# Patient Record
Sex: MALE | Race: WHITE | NOT HISPANIC OR LATINO | Employment: FULL TIME | URBAN - METROPOLITAN AREA
[De-identification: names, ages, dates, MRNs, and addresses within clinical notes are randomized per-mention and may not be internally consistent; named-entity substitution may affect disease eponyms.]

---

## 2014-12-05 LAB — CRC RECOMMENDATION EXT: NORMAL

## 2017-07-06 ENCOUNTER — HISTORICAL (OUTPATIENT)
Dept: LAB | Facility: HOSPITAL | Age: 62
End: 2017-07-06

## 2017-07-06 LAB
ABS NEUT (OLG): 4.4 X10(3)/MCL (ref 2.1–9.2)
ALBUMIN SERPL-MCNC: 3.9 GM/DL (ref 3.4–5)
ALBUMIN/GLOB SERPL: 1.1 RATIO (ref 1.1–2)
ALP SERPL-CCNC: 60 UNIT/L (ref 46–116)
ALT SERPL-CCNC: 55 UNIT/L (ref 12–78)
AST SERPL-CCNC: 28 UNIT/L (ref 15–37)
BASOPHILS NFR BLD AUTO: 1 % (ref 0–2)
BILIRUB SERPL-MCNC: 0.3 MG/DL (ref 0.2–1)
BILIRUBIN DIRECT+TOT PNL SERPL-MCNC: 0.06 MG/DL (ref 0–0.2)
BILIRUBIN DIRECT+TOT PNL SERPL-MCNC: 0.24 MG/DL (ref 0–0.8)
BUN SERPL-MCNC: 11 MG/DL (ref 7–18)
CALCIUM SERPL-MCNC: 9.2 MG/DL (ref 8.5–10.1)
CHLORIDE SERPL-SCNC: 106 MMOL/L (ref 98–107)
CHOLEST SERPL-MCNC: 242 MG/DL (ref 0–200)
CHOLEST/HDLC SERPL: 5.8 {RATIO} (ref 0–5)
CO2 SERPL-SCNC: 28.2 MMOL/L (ref 21–32)
CREAT SERPL-MCNC: 0.95 MG/DL (ref 0.6–1.3)
DEPRECATED CALCIDIOL+CALCIFEROL SERPL-MC: 23.97 NG/ML (ref 30–80)
EOSINOPHIL # BLD AUTO: 0.1 X10(3)/MCL
EOSINOPHIL NFR BLD AUTO: 2 %
ERYTHROCYTE [DISTWIDTH] IN BLOOD BY AUTOMATED COUNT: 13 % (ref 11.5–17)
GLOBULIN SER-MCNC: 3.4 GM/DL (ref 2.4–3.5)
GLUCOSE SERPL-MCNC: 108 MG/DL (ref 74–106)
HCT VFR BLD AUTO: 45.9 % (ref 42–52)
HDLC SERPL-MCNC: 42 MG/DL (ref 40–60)
HGB BLD-MCNC: 15.5 GM/DL (ref 14–18)
LDLC SERPL CALC-MCNC: 95 MG/DL (ref 0–129)
LYMPHOCYTES # BLD AUTO: 2.3 X10(3)/MCL
LYMPHOCYTES NFR BLD AUTO: 31 % (ref 13–40)
MCH RBC QN AUTO: 31.7 PG (ref 27–31)
MCHC RBC AUTO-ENTMCNC: 33.7 GM/DL (ref 33–36)
MCV RBC AUTO: 94.1 FL (ref 80–94)
MONOCYTES # BLD AUTO: 0.6 X10(3)/MCL
MONOCYTES NFR BLD AUTO: 8 % (ref 2–11)
NEUTROPHILS # BLD AUTO: 4.4 X10(3)/MCL (ref 2.1–9.2)
NEUTROPHILS NFR BLD AUTO: 59 % (ref 47–80)
PLATELET # BLD AUTO: 251 X10(3)/MCL (ref 130–400)
PMV BLD AUTO: 7.3 FL (ref 7.4–10.4)
POTASSIUM SERPL-SCNC: 4.4 MMOL/L (ref 3.5–5.1)
PROT SERPL-MCNC: 7.3 GM/DL (ref 6.4–8.2)
PSA SERPL-MCNC: 1.59 NG/ML (ref 0–4)
RBC # BLD AUTO: 4.88 X10(6)/MCL (ref 4.7–6.1)
SODIUM SERPL-SCNC: 143 MMOL/L (ref 136–145)
TRIGL SERPL-MCNC: 525 MG/DL
TSH SERPL-ACNC: 0.81 MIU/ML (ref 0.36–3.74)
VLDLC SERPL CALC-MCNC: 105 MG/DL
WBC # SPEC AUTO: 7.4 X10(3)/MCL (ref 4.5–11.5)

## 2017-11-10 ENCOUNTER — HISTORICAL (OUTPATIENT)
Dept: LAB | Facility: HOSPITAL | Age: 62
End: 2017-11-10

## 2017-11-10 LAB
CHOLEST SERPL-MCNC: 221 MG/DL (ref 0–200)
CHOLEST/HDLC SERPL: 4.3 {RATIO} (ref 0–5)
DEPRECATED CALCIDIOL+CALCIFEROL SERPL-MC: 36.68 NG/ML (ref 30–80)
HDLC SERPL-MCNC: 51 MG/DL (ref 40–60)
LDLC SERPL CALC-MCNC: 127 MG/DL (ref 0–129)
TRIGL SERPL-MCNC: 213 MG/DL
VLDLC SERPL CALC-MCNC: 43 MG/DL

## 2018-06-19 ENCOUNTER — HISTORICAL (OUTPATIENT)
Dept: RADIOLOGY | Facility: HOSPITAL | Age: 63
End: 2018-06-19

## 2018-06-27 ENCOUNTER — HISTORICAL (OUTPATIENT)
Dept: RADIOLOGY | Facility: HOSPITAL | Age: 63
End: 2018-06-27

## 2018-07-11 ENCOUNTER — HISTORICAL (OUTPATIENT)
Dept: RADIOLOGY | Facility: HOSPITAL | Age: 63
End: 2018-07-11

## 2019-02-01 ENCOUNTER — HISTORICAL (OUTPATIENT)
Dept: LAB | Facility: HOSPITAL | Age: 64
End: 2019-02-01

## 2019-02-01 LAB
ABS NEUT (OLG): 3.1 X10(3)/MCL (ref 2.1–9.2)
ALBUMIN SERPL-MCNC: 4.2 GM/DL (ref 3.4–5)
ALBUMIN/GLOB SERPL: 1.3 RATIO (ref 1.1–2)
ALP SERPL-CCNC: 55 UNIT/L (ref 46–116)
ALT SERPL-CCNC: 45 UNIT/L (ref 12–78)
AST SERPL-CCNC: 22 UNIT/L (ref 15–37)
BASOPHILS # BLD AUTO: 0 X10(3)/MCL (ref 0–0.2)
BASOPHILS NFR BLD AUTO: 0 %
BILIRUB SERPL-MCNC: 0.3 MG/DL (ref 0.2–1)
BILIRUBIN DIRECT+TOT PNL SERPL-MCNC: 0.07 MG/DL (ref 0–0.2)
BILIRUBIN DIRECT+TOT PNL SERPL-MCNC: 0.23 MG/DL (ref 0–0.8)
BUN SERPL-MCNC: 10 MG/DL (ref 7–18)
CALCIUM SERPL-MCNC: 9.3 MG/DL (ref 8.5–10.1)
CHLORIDE SERPL-SCNC: 102 MMOL/L (ref 98–107)
CHOLEST SERPL-MCNC: 210 MG/DL (ref 0–200)
CHOLEST/HDLC SERPL: 4.3 {RATIO} (ref 0–5)
CO2 SERPL-SCNC: 25 MMOL/L (ref 21–32)
CREAT SERPL-MCNC: 1.05 MG/DL (ref 0.6–1.3)
DEPRECATED CALCIDIOL+CALCIFEROL SERPL-MC: 36 NG/ML (ref 30–80)
EOSINOPHIL # BLD AUTO: 0.1 X10(3)/MCL (ref 0–0.9)
EOSINOPHIL NFR BLD AUTO: 2 %
ERYTHROCYTE [DISTWIDTH] IN BLOOD BY AUTOMATED COUNT: 11.8 % (ref 11.5–17)
EST. AVERAGE GLUCOSE BLD GHB EST-MCNC: 123 MG/DL
GLOBULIN SER-MCNC: 3.3 GM/DL (ref 2.4–3.5)
GLUCOSE SERPL-MCNC: 110 MG/DL (ref 74–106)
HBA1C MFR BLD: 5.9 % (ref 4.5–6.2)
HCT VFR BLD AUTO: 50.2 % (ref 42–52)
HDLC SERPL-MCNC: 49 MG/DL (ref 40–60)
HGB BLD-MCNC: 17.2 GM/DL (ref 14–18)
IMM GRANULOCYTES # BLD AUTO: 0.02 % (ref 0–0.02)
IMM GRANULOCYTES NFR BLD AUTO: 0.4 % (ref 0–0.43)
LDLC SERPL CALC-MCNC: 127 MG/DL (ref 0–129)
LYMPHOCYTES # BLD AUTO: 2 X10(3)/MCL (ref 0.6–4.6)
LYMPHOCYTES NFR BLD AUTO: 35 %
MCH RBC QN AUTO: 32.1 PG (ref 27–31)
MCHC RBC AUTO-ENTMCNC: 34.3 GM/DL (ref 33–36)
MCV RBC AUTO: 93.7 FL (ref 80–94)
MONOCYTES # BLD AUTO: 0.4 X10(3)/MCL (ref 0.1–1.3)
MONOCYTES NFR BLD AUTO: 8 %
NEUTROPHILS # BLD AUTO: 3.1 X10(3)/MCL (ref 1.4–7.9)
NEUTROPHILS NFR BLD AUTO: 55 %
PLATELET # BLD AUTO: 222 X10(3)/MCL (ref 130–400)
PMV BLD AUTO: 9.4 FL (ref 9.4–12.4)
POTASSIUM SERPL-SCNC: 4.1 MMOL/L (ref 3.5–5.1)
PROT SERPL-MCNC: 7.5 GM/DL (ref 6.4–8.2)
PSA SERPL-MCNC: 2.63 NG/ML (ref 0–4)
RBC # BLD AUTO: 5.36 X10(6)/MCL (ref 4.7–6.1)
SODIUM SERPL-SCNC: 137 MMOL/L (ref 136–145)
TRIGL SERPL-MCNC: 169 MG/DL
TSH SERPL-ACNC: 0.71 MIU/ML (ref 0.36–3.74)
VLDLC SERPL CALC-MCNC: 34 MG/DL
WBC # SPEC AUTO: 5.7 X10(3)/MCL (ref 4.5–11.5)

## 2019-03-08 ENCOUNTER — HISTORICAL (OUTPATIENT)
Dept: RADIOLOGY | Facility: HOSPITAL | Age: 64
End: 2019-03-08

## 2019-04-01 ENCOUNTER — HISTORICAL (OUTPATIENT)
Dept: RADIOLOGY | Facility: HOSPITAL | Age: 64
End: 2019-04-01

## 2019-04-17 ENCOUNTER — HISTORICAL (OUTPATIENT)
Dept: RADIOLOGY | Facility: HOSPITAL | Age: 64
End: 2019-04-17

## 2019-04-17 LAB
ABS NEUT (OLG): 3.56 X10(3)/MCL (ref 2.1–9.2)
BASOPHILS # BLD AUTO: 0.03 X10(3)/MCL (ref 0–0.2)
BASOPHILS NFR BLD AUTO: 0.5 % (ref 0–0.9)
BUN SERPL-MCNC: 12 MG/DL (ref 7–18)
CALCIUM SERPL-MCNC: 9 MG/DL (ref 8.5–10.1)
CHLORIDE SERPL-SCNC: 107 MMOL/L (ref 98–107)
CO2 SERPL-SCNC: 25 MMOL/L (ref 21–32)
CREAT SERPL-MCNC: 0.97 MG/DL (ref 0.7–1.3)
CREAT/UREA NIT SERPL: 12
EOSINOPHIL # BLD AUTO: 0.11 X10(3)/MCL (ref 0–0.9)
EOSINOPHIL NFR BLD AUTO: 1.9 % (ref 0–6.5)
ERYTHROCYTE [DISTWIDTH] IN BLOOD BY AUTOMATED COUNT: 12.1 % (ref 11.5–17)
GLUCOSE SERPL-MCNC: 110 MG/DL (ref 74–106)
HCT VFR BLD AUTO: 51.2 % (ref 42–52)
HGB BLD-MCNC: 17.1 GM/DL (ref 14–18)
IMM GRANULOCYTES # BLD AUTO: 0.02 10*3/UL (ref 0–0.02)
IMM GRANULOCYTES NFR BLD AUTO: 0.3 % (ref 0–0.43)
LYMPHOCYTES # BLD AUTO: 1.72 X10(3)/MCL (ref 0.6–4.6)
LYMPHOCYTES NFR BLD AUTO: 29.2 % (ref 16.2–38.3)
MCH RBC QN AUTO: 31.8 PG (ref 27–31)
MCHC RBC AUTO-ENTMCNC: 33.4 GM/DL (ref 33–36)
MCV RBC AUTO: 95.3 FL (ref 80–94)
MONOCYTES # BLD AUTO: 0.45 X10(3)/MCL (ref 0.1–1.3)
MONOCYTES NFR BLD AUTO: 7.6 % (ref 4.7–11.3)
NEUTROPHILS # BLD AUTO: 3.56 X10(3)/MCL (ref 2.1–9.2)
NEUTROPHILS NFR BLD AUTO: 60.5 % (ref 49.1–73.4)
NRBC BLD AUTO-RTO: 0 % (ref 0–0.2)
PLATELET # BLD AUTO: 202 X10(3)/MCL (ref 130–400)
PMV BLD AUTO: 8.6 FL (ref 7.4–10.4)
POTASSIUM SERPL-SCNC: 4.2 MMOL/L (ref 3.5–5.1)
RBC # BLD AUTO: 5.37 X10(6)/MCL (ref 4.7–6.1)
SODIUM SERPL-SCNC: 138 MMOL/L (ref 136–145)
WBC # SPEC AUTO: 5.9 X10(3)/MCL (ref 4.5–11.5)

## 2019-04-26 ENCOUNTER — HISTORICAL (OUTPATIENT)
Dept: SURGERY | Facility: HOSPITAL | Age: 64
End: 2019-04-26

## 2019-05-06 ENCOUNTER — HISTORICAL (OUTPATIENT)
Dept: RADIOLOGY | Facility: HOSPITAL | Age: 64
End: 2019-05-06

## 2019-06-27 ENCOUNTER — HISTORICAL (OUTPATIENT)
Dept: ADMINISTRATIVE | Facility: HOSPITAL | Age: 64
End: 2019-06-27

## 2020-04-29 LAB
ABS NEUT (OLG): 3.74 X10(3)/MCL (ref 2.1–9.2)
ALBUMIN SERPL-MCNC: 4.4 GM/DL (ref 3.4–4.8)
ALBUMIN/GLOB SERPL: 1.5 RATIO (ref 1.1–2)
ALP SERPL-CCNC: 56 UNIT/L (ref 40–150)
ALT SERPL-CCNC: 18 UNIT/L (ref 0–55)
AST SERPL-CCNC: 20 UNIT/L (ref 5–34)
BASOPHILS # BLD AUTO: 0 X10(3)/MCL (ref 0–0.2)
BASOPHILS NFR BLD AUTO: 0 %
BILIRUB SERPL-MCNC: 0.6 MG/DL
BILIRUBIN DIRECT+TOT PNL SERPL-MCNC: 0.2 MG/DL (ref 0–0.5)
BILIRUBIN DIRECT+TOT PNL SERPL-MCNC: 0.4 MG/DL (ref 0–0.8)
BUN SERPL-MCNC: 9 MG/DL (ref 8.4–25.7)
CALCIUM SERPL-MCNC: 9.4 MG/DL (ref 8.8–10)
CHLORIDE SERPL-SCNC: 107 MMOL/L (ref 98–107)
CO2 SERPL-SCNC: 25 MMOL/L (ref 23–31)
CREAT SERPL-MCNC: 0.95 MG/DL (ref 0.73–1.18)
EOSINOPHIL # BLD AUTO: 0.1 X10(3)/MCL (ref 0–0.9)
EOSINOPHIL NFR BLD AUTO: 1 %
ERYTHROCYTE [DISTWIDTH] IN BLOOD BY AUTOMATED COUNT: 12.1 % (ref 11.5–17)
GLOBULIN SER-MCNC: 2.9 GM/DL (ref 2.4–3.5)
GLUCOSE SERPL-MCNC: 105 MG/DL (ref 82–115)
HCT VFR BLD AUTO: 50.3 % (ref 42–52)
HGB BLD-MCNC: 16.4 GM/DL (ref 14–18)
LYMPHOCYTES # BLD AUTO: 1.8 X10(3)/MCL (ref 0.6–4.6)
LYMPHOCYTES NFR BLD AUTO: 30 %
MCH RBC QN AUTO: 30.8 PG (ref 27–31)
MCHC RBC AUTO-ENTMCNC: 32.6 GM/DL (ref 33–36)
MCV RBC AUTO: 94.5 FL (ref 80–94)
MONOCYTES # BLD AUTO: 0.4 X10(3)/MCL (ref 0.1–1.3)
MONOCYTES NFR BLD AUTO: 6 %
NEUTROPHILS # BLD AUTO: 3.74 X10(3)/MCL (ref 2.1–9.2)
NEUTROPHILS NFR BLD AUTO: 62 %
PLATELET # BLD AUTO: 180 X10(3)/MCL (ref 130–400)
PMV BLD AUTO: 9 FL (ref 9.4–12.4)
POTASSIUM SERPL-SCNC: 4 MMOL/L (ref 3.5–5.1)
PROT SERPL-MCNC: 7.3 GM/DL (ref 5.8–7.6)
RBC # BLD AUTO: 5.32 X10(6)/MCL (ref 4.7–6.1)
SODIUM SERPL-SCNC: 138 MMOL/L (ref 136–145)
WBC # SPEC AUTO: 6 X10(3)/MCL (ref 4.5–11.5)

## 2020-04-30 ENCOUNTER — HISTORICAL (OUTPATIENT)
Dept: ADMINISTRATIVE | Facility: HOSPITAL | Age: 65
End: 2020-04-30

## 2020-04-30 LAB — SARS-COV-2 RNA RESP QL NAA+PROBE: NOT DETECTED

## 2020-05-18 ENCOUNTER — HISTORICAL (OUTPATIENT)
Dept: ADMINISTRATIVE | Facility: HOSPITAL | Age: 65
End: 2020-05-18

## 2020-05-18 LAB
ABS NEUT (OLG): 3.44 X10(3)/MCL (ref 2.1–9.2)
ALBUMIN SERPL-MCNC: 4.1 GM/DL (ref 3.4–4.8)
ALBUMIN/GLOB SERPL: 1.4 RATIO (ref 1.1–2)
ALP SERPL-CCNC: 64 UNIT/L (ref 40–150)
ALT SERPL-CCNC: 20 UNIT/L (ref 0–55)
AST SERPL-CCNC: 17 UNIT/L (ref 5–34)
BASOPHILS # BLD AUTO: 0 X10(3)/MCL (ref 0–0.2)
BASOPHILS NFR BLD AUTO: 0.5 %
BILIRUB SERPL-MCNC: 0.4 MG/DL
BILIRUBIN DIRECT+TOT PNL SERPL-MCNC: 0.2 MG/DL (ref 0–0.5)
BILIRUBIN DIRECT+TOT PNL SERPL-MCNC: 0.2 MG/DL (ref 0–0.8)
BUN SERPL-MCNC: 12.7 MG/DL (ref 8.4–25.7)
CALCIUM SERPL-MCNC: 9.3 MG/DL (ref 8.8–10)
CHLORIDE SERPL-SCNC: 106 MMOL/L (ref 98–107)
CO2 SERPL-SCNC: 26 MMOL/L (ref 23–31)
CREAT SERPL-MCNC: 1.03 MG/DL (ref 0.73–1.18)
EOSINOPHIL # BLD AUTO: 0.1 X10(3)/MCL (ref 0–0.9)
EOSINOPHIL NFR BLD AUTO: 2.1 %
ERYTHROCYTE [DISTWIDTH] IN BLOOD BY AUTOMATED COUNT: 11.9 % (ref 11.5–17)
GLOBULIN SER-MCNC: 2.9 GM/DL (ref 2.4–3.5)
GLUCOSE SERPL-MCNC: 96 MG/DL (ref 82–115)
HCT VFR BLD AUTO: 48.4 % (ref 42–52)
HGB BLD-MCNC: 15.8 GM/DL (ref 14–18)
LDH SERPL-CCNC: 170 UNIT/L (ref 140–271)
LYMPHOCYTES # BLD AUTO: 2 X10(3)/MCL (ref 0.6–4.6)
LYMPHOCYTES NFR BLD AUTO: 33.3 %
MCH RBC QN AUTO: 30.7 PG (ref 27–31)
MCHC RBC AUTO-ENTMCNC: 32.6 GM/DL (ref 33–36)
MCV RBC AUTO: 94.2 FL (ref 80–94)
MONOCYTES # BLD AUTO: 0.4 X10(3)/MCL (ref 0.1–1.3)
MONOCYTES NFR BLD AUTO: 7.1 %
NEUTROPHILS # BLD AUTO: 3.4 X10(3)/MCL (ref 2.1–9.2)
NEUTROPHILS NFR BLD AUTO: 56.8 %
PLATELET # BLD AUTO: 189 X10(3)/MCL (ref 130–400)
PMV BLD AUTO: 8.3 FL (ref 9.4–12.4)
POTASSIUM SERPL-SCNC: 4.2 MMOL/L (ref 3.5–5.1)
PROT SERPL-MCNC: 7 GM/DL (ref 5.8–7.6)
RBC # BLD AUTO: 5.14 X10(6)/MCL (ref 4.7–6.1)
SODIUM SERPL-SCNC: 139 MMOL/L (ref 136–145)
WBC # SPEC AUTO: 6.1 X10(3)/MCL (ref 4.5–11.5)

## 2020-06-04 ENCOUNTER — HISTORICAL (OUTPATIENT)
Dept: ADMINISTRATIVE | Facility: HOSPITAL | Age: 65
End: 2020-06-04

## 2020-06-08 ENCOUNTER — HISTORICAL (OUTPATIENT)
Dept: ADMINISTRATIVE | Facility: HOSPITAL | Age: 65
End: 2020-06-08

## 2020-06-08 LAB
ABS NEUT (OLG): 3 X10(3)/MCL (ref 2.1–9.2)
ALBUMIN SERPL-MCNC: 4 GM/DL (ref 3.4–4.8)
ALBUMIN/GLOB SERPL: 1.2 RATIO (ref 1.1–2)
ALP SERPL-CCNC: 63 UNIT/L (ref 40–150)
ALT SERPL-CCNC: 19 UNIT/L (ref 0–55)
AST SERPL-CCNC: 16 UNIT/L (ref 5–34)
BASOPHILS # BLD AUTO: 0 X10(3)/MCL (ref 0–0.2)
BASOPHILS NFR BLD AUTO: 0.4 %
BILIRUB SERPL-MCNC: 0.3 MG/DL
BILIRUBIN DIRECT+TOT PNL SERPL-MCNC: 0.1 MG/DL (ref 0–0.5)
BILIRUBIN DIRECT+TOT PNL SERPL-MCNC: 0.2 MG/DL (ref 0–0.8)
BUN SERPL-MCNC: 11.5 MG/DL (ref 8.4–25.7)
CALCIUM SERPL-MCNC: 9.1 MG/DL (ref 8.8–10)
CHLORIDE SERPL-SCNC: 107 MMOL/L (ref 98–107)
CO2 SERPL-SCNC: 25 MMOL/L (ref 23–31)
CREAT SERPL-MCNC: 0.91 MG/DL (ref 0.73–1.18)
EOSINOPHIL # BLD AUTO: 0.1 X10(3)/MCL (ref 0–0.9)
EOSINOPHIL NFR BLD AUTO: 2 %
ERYTHROCYTE [DISTWIDTH] IN BLOOD BY AUTOMATED COUNT: 11.7 % (ref 11.5–17)
GLOBULIN SER-MCNC: 3.2 GM/DL (ref 2.4–3.5)
GLUCOSE SERPL-MCNC: 101 MG/DL (ref 82–115)
HCT VFR BLD AUTO: 46.6 % (ref 42–52)
HGB BLD-MCNC: 15.2 GM/DL (ref 14–18)
LDH SERPL-CCNC: 176 UNIT/L (ref 140–271)
LYMPHOCYTES # BLD AUTO: 2 X10(3)/MCL (ref 0.6–4.6)
LYMPHOCYTES NFR BLD AUTO: 36.1 %
MCH RBC QN AUTO: 30.8 PG (ref 27–31)
MCHC RBC AUTO-ENTMCNC: 32.6 GM/DL (ref 33–36)
MCV RBC AUTO: 94.3 FL (ref 80–94)
MONOCYTES # BLD AUTO: 0.4 X10(3)/MCL (ref 0.1–1.3)
MONOCYTES NFR BLD AUTO: 7.3 %
NEUTROPHILS # BLD AUTO: 3 X10(3)/MCL (ref 2.1–9.2)
NEUTROPHILS NFR BLD AUTO: 53.7 %
PLATELET # BLD AUTO: 197 X10(3)/MCL (ref 130–400)
PMV BLD AUTO: 8.4 FL (ref 9.4–12.4)
POTASSIUM SERPL-SCNC: 4.2 MMOL/L (ref 3.5–5.1)
PROT SERPL-MCNC: 7.2 GM/DL (ref 5.8–7.6)
RBC # BLD AUTO: 4.94 X10(6)/MCL (ref 4.7–6.1)
SODIUM SERPL-SCNC: 141 MMOL/L (ref 136–145)
WBC # SPEC AUTO: 5.6 X10(3)/MCL (ref 4.5–11.5)

## 2020-06-24 ENCOUNTER — HISTORICAL (OUTPATIENT)
Dept: LAB | Facility: HOSPITAL | Age: 65
End: 2020-06-24

## 2020-06-24 LAB
ABS NEUT (OLG): 4 X10(3)/MCL (ref 2.1–9.2)
ALBUMIN SERPL-MCNC: 3.9 GM/DL (ref 3.4–5)
ALBUMIN/GLOB SERPL: 0.8 RATIO (ref 1.1–2)
ALP SERPL-CCNC: 71 UNIT/L (ref 46–116)
ALT SERPL-CCNC: 19 UNIT/L (ref 12–78)
APPEARANCE, UA: CLEAR
AST SERPL-CCNC: 20 UNIT/L (ref 15–37)
BACTERIA SPEC CULT: NORMAL
BASOPHILS # BLD AUTO: 0 X10(3)/MCL (ref 0–0.2)
BASOPHILS NFR BLD AUTO: 1 %
BILIRUB SERPL-MCNC: 0.6 MG/DL (ref 0.2–1)
BILIRUB UR QL STRIP: NORMAL
BILIRUBIN DIRECT+TOT PNL SERPL-MCNC: 0.18 MG/DL (ref 0–0.2)
BILIRUBIN DIRECT+TOT PNL SERPL-MCNC: 0.42 MG/DL (ref 0–0.8)
BUN SERPL-MCNC: 13.1 MG/DL (ref 7–18)
CALCIUM SERPL-MCNC: 10 MG/DL (ref 8.5–10.1)
CHLORIDE SERPL-SCNC: 100 MMOL/L (ref 98–107)
CHOLEST SERPL-MCNC: 173 MG/DL (ref 0–200)
CHOLEST/HDLC SERPL: 3.1 {RATIO} (ref 0–5)
CO2 SERPL-SCNC: 29.4 MMOL/L (ref 21–32)
COLOR UR: YELLOW
CREAT SERPL-MCNC: 1.03 MG/DL (ref 0.6–1.3)
DEPRECATED CALCIDIOL+CALCIFEROL SERPL-MC: 50.1 NG/ML (ref 6.6–49.9)
EOSINOPHIL # BLD AUTO: 0.1 X10(3)/MCL (ref 0–0.9)
EOSINOPHIL NFR BLD AUTO: 1 %
ERYTHROCYTE [DISTWIDTH] IN BLOOD BY AUTOMATED COUNT: 11.8 % (ref 11.5–17)
GLOBULIN SER-MCNC: 4.7 GM/DL (ref 2.4–3.5)
GLUCOSE (UA): NEGATIVE
GLUCOSE SERPL-MCNC: 117 MG/DL (ref 74–106)
HCT VFR BLD AUTO: 48.6 % (ref 42–52)
HDLC SERPL-MCNC: 55 MG/DL (ref 40–60)
HGB BLD-MCNC: 15.9 GM/DL (ref 14–18)
HGB UR QL STRIP: NORMAL
IMM GRANULOCYTES # BLD AUTO: 0.02 % (ref 0–0.02)
IMM GRANULOCYTES NFR BLD AUTO: 0.3 % (ref 0–0.43)
KETONES UR QL STRIP: 15
LDLC SERPL CALC-MCNC: 107 MG/DL (ref 0–129)
LEUKOCYTE ESTERASE UR QL STRIP: NEGATIVE
LYMPHOCYTES # BLD AUTO: 1.5 X10(3)/MCL (ref 0.6–4.6)
LYMPHOCYTES NFR BLD AUTO: 23 %
MCH RBC QN AUTO: 30.3 PG (ref 27–31)
MCHC RBC AUTO-ENTMCNC: 32.7 GM/DL (ref 33–36)
MCV RBC AUTO: 92.6 FL (ref 80–94)
MONOCYTES # BLD AUTO: 0.9 X10(3)/MCL (ref 0.1–1.3)
MONOCYTES NFR BLD AUTO: 14 %
NEUTROPHILS # BLD AUTO: 4 X10(3)/MCL (ref 1.4–7.9)
NEUTROPHILS NFR BLD AUTO: 61 %
NITRITE UR QL STRIP: NEGATIVE
PH UR STRIP: 5.5 [PH] (ref 5–9)
PLATELET # BLD AUTO: 191 X10(3)/MCL (ref 130–400)
PMV BLD AUTO: 8.7 FL (ref 9.4–12.4)
POTASSIUM SERPL-SCNC: 4.5 MMOL/L (ref 3.5–5.1)
PROT SERPL-MCNC: 8.6 GM/DL (ref 6.4–8.2)
PROT UR QL STRIP: 30
PSA SERPL-MCNC: 9.35 NG/ML (ref 0–4)
RBC # BLD AUTO: 5.25 X10(6)/MCL (ref 4.7–6.1)
RBC #/AREA URNS HPF: NORMAL /HPF
SODIUM SERPL-SCNC: 137 MMOL/L (ref 136–145)
SP GR UR STRIP: 1.02 (ref 1–1.03)
SQUAMOUS EPITHELIAL, UA: NORMAL
TRIGL SERPL-MCNC: 54 MG/DL
UROBILINOGEN UR STRIP-ACNC: 0.2
VLDLC SERPL CALC-MCNC: 11 MG/DL
WBC # SPEC AUTO: 6.6 X10(3)/MCL (ref 4.5–11.5)
WBC #/AREA URNS HPF: NORMAL /[HPF]

## 2020-06-25 LAB — PROT SERPL-MCNC: 8 GM/DL

## 2020-07-23 ENCOUNTER — HISTORICAL (OUTPATIENT)
Dept: LAB | Facility: HOSPITAL | Age: 65
End: 2020-07-23

## 2020-07-23 LAB
ALBUMIN SERPL-MCNC: 3.8 GM/DL (ref 3.4–5)
ALBUMIN SERPL-MCNC: 3.8 GM/DL (ref 3.4–5)
ALBUMIN/GLOB SERPL: 1.2 RATIO (ref 1.1–2)
ALBUMIN/GLOB SERPL: 1.2 RATIO (ref 1.1–2)
ALP SERPL-CCNC: 72 UNIT/L (ref 46–116)
ALP SERPL-CCNC: 72 UNIT/L (ref 46–116)
ALT SERPL-CCNC: 20 UNIT/L (ref 12–78)
ALT SERPL-CCNC: 21 UNIT/L (ref 12–78)
AST SERPL-CCNC: 17 UNIT/L (ref 15–37)
AST SERPL-CCNC: 18 UNIT/L (ref 15–37)
BILIRUB SERPL-MCNC: 0.3 MG/DL (ref 0.2–1)
BILIRUB SERPL-MCNC: 0.3 MG/DL (ref 0.2–1)
BILIRUBIN DIRECT+TOT PNL SERPL-MCNC: 0.1 MG/DL (ref 0–0.2)
BILIRUBIN DIRECT+TOT PNL SERPL-MCNC: 0.13 MG/DL (ref 0–0.2)
BILIRUBIN DIRECT+TOT PNL SERPL-MCNC: 0.17 MG/DL (ref 0–0.8)
BILIRUBIN DIRECT+TOT PNL SERPL-MCNC: 0.2 MG/DL (ref 0–0.8)
BUN SERPL-MCNC: 9.5 MG/DL (ref 7–18)
BUN SERPL-MCNC: 9.5 MG/DL (ref 7–18)
CALCIUM SERPL-MCNC: 9.4 MG/DL (ref 8.5–10.1)
CALCIUM SERPL-MCNC: 9.5 MG/DL (ref 8.5–10.1)
CHLORIDE SERPL-SCNC: 107 MMOL/L (ref 98–107)
CHLORIDE SERPL-SCNC: 107 MMOL/L (ref 98–107)
CO2 SERPL-SCNC: 27.5 MMOL/L (ref 21–32)
CO2 SERPL-SCNC: 29.2 MMOL/L (ref 21–32)
CREAT SERPL-MCNC: 0.95 MG/DL (ref 0.6–1.3)
CREAT SERPL-MCNC: 1.02 MG/DL (ref 0.6–1.3)
ERYTHROCYTE [DISTWIDTH] IN BLOOD BY AUTOMATED COUNT: 12.1 % (ref 11.5–17)
GLOBULIN SER-MCNC: 3.3 GM/DL (ref 2.4–3.5)
GLOBULIN SER-MCNC: 3.3 GM/DL (ref 2.4–3.5)
GLUCOSE SERPL-MCNC: 110 MG/DL (ref 74–106)
GLUCOSE SERPL-MCNC: 113 MG/DL (ref 74–106)
HCT VFR BLD AUTO: 45.8 % (ref 42–52)
HGB BLD-MCNC: 14.9 GM/DL (ref 14–18)
IGG SERPL-MCNC: 981 MG/DL (ref 240–1822)
LDH SERPL-CCNC: 150 UNIT/L (ref 81–234)
MCH RBC QN AUTO: 29.8 PG (ref 27–31)
MCHC RBC AUTO-ENTMCNC: 32.5 GM/DL (ref 33–36)
MCV RBC AUTO: 91.6 FL (ref 80–94)
PLATELET # BLD AUTO: 143 X10(3)/MCL (ref 130–400)
PMV BLD AUTO: 9.2 FL (ref 9.4–12.4)
POTASSIUM SERPL-SCNC: 4.2 MMOL/L (ref 3.5–5.1)
POTASSIUM SERPL-SCNC: 4.4 MMOL/L (ref 3.5–5.1)
PROT SERPL-MCNC: 7.1 GM/DL (ref 6.4–8.2)
PROT SERPL-MCNC: 7.1 GM/DL (ref 6.4–8.2)
PSA SERPL-MCNC: 9.83 NG/ML (ref 0–4)
RBC # BLD AUTO: 5 X10(6)/MCL (ref 4.7–6.1)
SODIUM SERPL-SCNC: 142 MMOL/L (ref 136–145)
SODIUM SERPL-SCNC: 143 MMOL/L (ref 136–145)
WBC # SPEC AUTO: 5.5 X10(3)/MCL (ref 4.5–11.5)

## 2020-10-06 ENCOUNTER — HISTORICAL (OUTPATIENT)
Dept: HEMATOLOGY/ONCOLOGY | Facility: CLINIC | Age: 65
End: 2020-10-06

## 2020-10-06 LAB
ABS NEUT (OLG): 4.68 X10(3)/MCL (ref 2.1–9.2)
ALBUMIN SERPL-MCNC: 4.5 GM/DL (ref 3.4–5)
ALBUMIN/GLOB SERPL: 1.7 RATIO (ref 1.1–2)
ALP SERPL-CCNC: 79 UNIT/L (ref 40–150)
ALT SERPL-CCNC: 12 UNIT/L (ref 0–55)
AST SERPL-CCNC: 17 UNIT/L (ref 5–34)
BASOPHILS # BLD AUTO: 0 X10(3)/MCL (ref 0–0.2)
BASOPHILS NFR BLD AUTO: 0.3 %
BILIRUB SERPL-MCNC: 0.8 MG/DL
BILIRUBIN DIRECT+TOT PNL SERPL-MCNC: 0.3 MG/DL (ref 0–0.5)
BILIRUBIN DIRECT+TOT PNL SERPL-MCNC: 0.5 MG/DL (ref 0–0.8)
BUN SERPL-MCNC: 11.2 MG/DL (ref 8.4–25.7)
CALCIUM SERPL-MCNC: 9.2 MG/DL (ref 8.8–10)
CHLORIDE SERPL-SCNC: 104 MMOL/L (ref 98–107)
CO2 SERPL-SCNC: 28 MMOL/L (ref 23–31)
CREAT SERPL-MCNC: 0.92 MG/DL (ref 0.73–1.18)
EOSINOPHIL # BLD AUTO: 0.1 X10(3)/MCL (ref 0–0.9)
EOSINOPHIL NFR BLD AUTO: 1 %
ERYTHROCYTE [DISTWIDTH] IN BLOOD BY AUTOMATED COUNT: 13 % (ref 11.5–17)
GLOBULIN SER-MCNC: 2.6 GM/DL (ref 2.4–3.5)
GLUCOSE SERPL-MCNC: 102 MG/DL (ref 82–115)
HCT VFR BLD AUTO: 47.2 % (ref 42–52)
HGB BLD-MCNC: 15.7 GM/DL (ref 14–18)
LDH SERPL-CCNC: 181 UNIT/L (ref 140–271)
LYMPHOCYTES # BLD AUTO: 1.6 X10(3)/MCL (ref 0.6–4.6)
LYMPHOCYTES NFR BLD AUTO: 24 %
MCH RBC QN AUTO: 30.4 PG (ref 27–31)
MCHC RBC AUTO-ENTMCNC: 33.3 GM/DL (ref 33–36)
MCV RBC AUTO: 91.5 FL (ref 80–94)
MONOCYTES # BLD AUTO: 0.4 X10(3)/MCL (ref 0.1–1.3)
MONOCYTES NFR BLD AUTO: 6.4 %
NEUTROPHILS # BLD AUTO: 4.7 X10(3)/MCL (ref 2.1–9.2)
NEUTROPHILS NFR BLD AUTO: 68 %
PLATELET # BLD AUTO: 147 X10(3)/MCL (ref 130–400)
PMV BLD AUTO: 8.6 FL (ref 9.4–12.4)
POC CREATININE: 0.8 MG/DL (ref 0.6–1.3)
POTASSIUM SERPL-SCNC: 4.2 MMOL/L (ref 3.5–5.1)
PROT SERPL-MCNC: 7.1 GM/DL (ref 5.8–7.6)
RBC # BLD AUTO: 5.16 X10(6)/MCL (ref 4.7–6.1)
SODIUM SERPL-SCNC: 142 MMOL/L (ref 136–145)
WBC # SPEC AUTO: 6.9 X10(3)/MCL (ref 4.5–11.5)

## 2020-12-11 ENCOUNTER — HISTORICAL (OUTPATIENT)
Dept: HEMATOLOGY/ONCOLOGY | Facility: CLINIC | Age: 65
End: 2020-12-11

## 2020-12-11 LAB
ABS NEUT (OLG): 4.27 X10(3)/MCL (ref 2.1–9.2)
ALBUMIN SERPL-MCNC: 3.9 GM/DL (ref 3.4–4.8)
ALBUMIN/GLOB SERPL: 1.3 RATIO (ref 1.1–2)
ALP SERPL-CCNC: 100 UNIT/L (ref 40–150)
ALT SERPL-CCNC: 11 UNIT/L (ref 0–55)
AST SERPL-CCNC: 12 UNIT/L (ref 5–34)
BASOPHILS # BLD AUTO: 0 X10(3)/MCL (ref 0–0.2)
BASOPHILS NFR BLD AUTO: 0.2 %
BILIRUB SERPL-MCNC: 0.5 MG/DL
BILIRUBIN DIRECT+TOT PNL SERPL-MCNC: 0.2 MG/DL (ref 0–0.5)
BILIRUBIN DIRECT+TOT PNL SERPL-MCNC: 0.3 MG/DL (ref 0–0.8)
BUN SERPL-MCNC: 10.5 MG/DL (ref 8.4–25.7)
CALCIUM SERPL-MCNC: 9.3 MG/DL (ref 8.8–10)
CHLORIDE SERPL-SCNC: 104 MMOL/L (ref 98–107)
CO2 SERPL-SCNC: 25 MMOL/L (ref 23–31)
CREAT SERPL-MCNC: 0.8 MG/DL (ref 0.73–1.18)
EOSINOPHIL # BLD AUTO: 0.1 X10(3)/MCL (ref 0–0.9)
EOSINOPHIL NFR BLD AUTO: 1.1 %
ERYTHROCYTE [DISTWIDTH] IN BLOOD BY AUTOMATED COUNT: 12.4 % (ref 11.5–17)
GLOBULIN SER-MCNC: 3 GM/DL (ref 2.4–3.5)
GLUCOSE SERPL-MCNC: 106 MG/DL (ref 82–115)
HCT VFR BLD AUTO: 45.6 % (ref 42–52)
HGB BLD-MCNC: 14.9 GM/DL (ref 14–18)
LDH SERPL-CCNC: 170 UNIT/L (ref 140–271)
LYMPHOCYTES # BLD AUTO: 1.6 X10(3)/MCL (ref 0.6–4.6)
LYMPHOCYTES NFR BLD AUTO: 25.2 %
MCH RBC QN AUTO: 30 PG (ref 27–31)
MCHC RBC AUTO-ENTMCNC: 32.7 GM/DL (ref 33–36)
MCV RBC AUTO: 91.8 FL (ref 80–94)
MONOCYTES # BLD AUTO: 0.5 X10(3)/MCL (ref 0.1–1.3)
MONOCYTES NFR BLD AUTO: 8.2 %
NEUTROPHILS # BLD AUTO: 4.3 X10(3)/MCL (ref 2.1–9.2)
NEUTROPHILS NFR BLD AUTO: 65.1 %
PLATELET # BLD AUTO: 179 X10(3)/MCL (ref 130–400)
PMV BLD AUTO: 8 FL (ref 9.4–12.4)
POTASSIUM SERPL-SCNC: 4.2 MMOL/L (ref 3.5–5.1)
PROT SERPL-MCNC: 6.9 GM/DL (ref 5.8–7.6)
RBC # BLD AUTO: 4.97 X10(6)/MCL (ref 4.7–6.1)
SODIUM SERPL-SCNC: 139 MMOL/L (ref 136–145)
WBC # SPEC AUTO: 6.6 X10(3)/MCL (ref 4.5–11.5)

## 2021-02-12 ENCOUNTER — HISTORICAL (OUTPATIENT)
Dept: HEMATOLOGY/ONCOLOGY | Facility: CLINIC | Age: 66
End: 2021-02-12

## 2021-02-12 LAB
ABS NEUT (OLG): 3.32 X10(3)/MCL (ref 2.1–9.2)
ALBUMIN SERPL-MCNC: 4 GM/DL (ref 3.4–4.8)
ALBUMIN/GLOB SERPL: 1.2 RATIO (ref 1.1–2)
ALP SERPL-CCNC: 114 UNIT/L (ref 40–150)
ALT SERPL-CCNC: 13 UNIT/L (ref 0–55)
AST SERPL-CCNC: 15 UNIT/L (ref 5–34)
BASOPHILS # BLD AUTO: 0 X10(3)/MCL (ref 0–0.2)
BASOPHILS NFR BLD AUTO: 0.4 %
BILIRUB SERPL-MCNC: 0.4 MG/DL
BILIRUBIN DIRECT+TOT PNL SERPL-MCNC: 0.2 MG/DL (ref 0–0.5)
BILIRUBIN DIRECT+TOT PNL SERPL-MCNC: 0.2 MG/DL (ref 0–0.8)
BUN SERPL-MCNC: 13 MG/DL (ref 8.4–25.7)
CALCIUM SERPL-MCNC: 9.5 MG/DL (ref 8.8–10)
CHLORIDE SERPL-SCNC: 103 MMOL/L (ref 98–107)
CO2 SERPL-SCNC: 27 MMOL/L (ref 23–31)
CREAT SERPL-MCNC: 0.87 MG/DL (ref 0.73–1.18)
EOSINOPHIL # BLD AUTO: 0.1 X10(3)/MCL (ref 0–0.9)
EOSINOPHIL NFR BLD AUTO: 2.2 %
ERYTHROCYTE [DISTWIDTH] IN BLOOD BY AUTOMATED COUNT: 13.2 % (ref 11.5–17)
GLOBULIN SER-MCNC: 3.3 GM/DL (ref 2.4–3.5)
GLUCOSE SERPL-MCNC: 88 MG/DL (ref 82–115)
HCT VFR BLD AUTO: 43.2 % (ref 42–52)
HGB BLD-MCNC: 14.2 GM/DL (ref 14–18)
LDH SERPL-CCNC: 195 UNIT/L (ref 140–271)
LYMPHOCYTES # BLD AUTO: 0.6 X10(3)/MCL (ref 0.6–4.6)
LYMPHOCYTES NFR BLD AUTO: 13.3 %
MCH RBC QN AUTO: 30 PG (ref 27–31)
MCHC RBC AUTO-ENTMCNC: 32.9 GM/DL (ref 33–36)
MCV RBC AUTO: 91.3 FL (ref 80–94)
MONOCYTES # BLD AUTO: 0.4 X10(3)/MCL (ref 0.1–1.3)
MONOCYTES NFR BLD AUTO: 10 %
NEUTROPHILS # BLD AUTO: 3.3 X10(3)/MCL (ref 2.1–9.2)
NEUTROPHILS NFR BLD AUTO: 73.9 %
PLATELET # BLD AUTO: 189 X10(3)/MCL (ref 130–400)
PMV BLD AUTO: 7.8 FL (ref 9.4–12.4)
POTASSIUM SERPL-SCNC: 4.6 MMOL/L (ref 3.5–5.1)
PROT SERPL-MCNC: 7.3 GM/DL (ref 5.8–7.6)
RBC # BLD AUTO: 4.73 X10(6)/MCL (ref 4.7–6.1)
SODIUM SERPL-SCNC: 140 MMOL/L (ref 136–145)
WBC # SPEC AUTO: 4.5 X10(3)/MCL (ref 4.5–11.5)

## 2021-03-04 ENCOUNTER — HISTORICAL (OUTPATIENT)
Dept: RADIOLOGY | Facility: HOSPITAL | Age: 66
End: 2021-03-04

## 2021-03-05 ENCOUNTER — HISTORICAL (OUTPATIENT)
Dept: HEMATOLOGY/ONCOLOGY | Facility: CLINIC | Age: 66
End: 2021-03-05

## 2021-03-05 LAB
ABS NEUT (OLG): 3.02 X10(3)/MCL (ref 2.1–9.2)
ALBUMIN SERPL-MCNC: 3.5 GM/DL (ref 3.4–4.8)
ALBUMIN/GLOB SERPL: 1 RATIO (ref 1.1–2)
ALP SERPL-CCNC: 105 UNIT/L (ref 40–150)
ALT SERPL-CCNC: 15 UNIT/L (ref 0–55)
AST SERPL-CCNC: 16 UNIT/L (ref 5–34)
BASOPHILS # BLD AUTO: 0 X10(3)/MCL (ref 0–0.2)
BASOPHILS NFR BLD AUTO: 0.5 %
BILIRUB SERPL-MCNC: 0.5 MG/DL
BILIRUBIN DIRECT+TOT PNL SERPL-MCNC: 0.2 MG/DL (ref 0–0.5)
BILIRUBIN DIRECT+TOT PNL SERPL-MCNC: 0.3 MG/DL (ref 0–0.8)
BUN SERPL-MCNC: 10.8 MG/DL (ref 8.4–25.7)
CALCIUM SERPL-MCNC: 9.3 MG/DL (ref 8.8–10)
CHLORIDE SERPL-SCNC: 102 MMOL/L (ref 98–107)
CO2 SERPL-SCNC: 27 MMOL/L (ref 23–31)
CREAT SERPL-MCNC: 0.79 MG/DL (ref 0.73–1.18)
EOSINOPHIL # BLD AUTO: 0.1 X10(3)/MCL (ref 0–0.9)
EOSINOPHIL NFR BLD AUTO: 1.9 %
ERYTHROCYTE [DISTWIDTH] IN BLOOD BY AUTOMATED COUNT: 13.5 % (ref 11.5–17)
GLOBULIN SER-MCNC: 3.4 GM/DL (ref 2.4–3.5)
GLUCOSE SERPL-MCNC: 134 MG/DL (ref 82–115)
HCT VFR BLD AUTO: 43.4 % (ref 42–52)
HGB BLD-MCNC: 13.9 GM/DL (ref 14–18)
LDH SERPL-CCNC: 175 UNIT/L (ref 140–271)
LYMPHOCYTES # BLD AUTO: 0.6 X10(3)/MCL (ref 0.6–4.6)
LYMPHOCYTES NFR BLD AUTO: 13.7 %
MCH RBC QN AUTO: 30.1 PG (ref 27–31)
MCHC RBC AUTO-ENTMCNC: 32 GM/DL (ref 33–36)
MCV RBC AUTO: 93.9 FL (ref 80–94)
MONOCYTES # BLD AUTO: 0.5 X10(3)/MCL (ref 0.1–1.3)
MONOCYTES NFR BLD AUTO: 11.3 %
NEUTROPHILS # BLD AUTO: 3 X10(3)/MCL (ref 2.1–9.2)
NEUTROPHILS NFR BLD AUTO: 72.4 %
PLATELET # BLD AUTO: 184 X10(3)/MCL (ref 130–400)
PMV BLD AUTO: 8.1 FL (ref 9.4–12.4)
POTASSIUM SERPL-SCNC: 3.8 MMOL/L (ref 3.5–5.1)
PROT SERPL-MCNC: 6.9 GM/DL (ref 5.8–7.6)
RBC # BLD AUTO: 4.62 X10(6)/MCL (ref 4.7–6.1)
SODIUM SERPL-SCNC: 138 MMOL/L (ref 136–145)
WBC # SPEC AUTO: 4.2 X10(3)/MCL (ref 4.5–11.5)

## 2021-04-16 ENCOUNTER — HISTORICAL (OUTPATIENT)
Dept: HEMATOLOGY/ONCOLOGY | Facility: CLINIC | Age: 66
End: 2021-04-16

## 2021-04-16 LAB
ABS NEUT (OLG): 4.42 X10(3)/MCL (ref 2.1–9.2)
ALBUMIN SERPL-MCNC: 3.7 GM/DL (ref 3.4–4.8)
ALBUMIN/GLOB SERPL: 1.1 RATIO (ref 1.1–2)
ALP SERPL-CCNC: 113 UNIT/L (ref 40–150)
ALT SERPL-CCNC: 13 UNIT/L (ref 0–55)
AST SERPL-CCNC: 16 UNIT/L (ref 5–34)
BASOPHILS # BLD AUTO: 0 X10(3)/MCL (ref 0–0.2)
BASOPHILS NFR BLD AUTO: 0.2 %
BILIRUB SERPL-MCNC: 0.4 MG/DL
BILIRUBIN DIRECT+TOT PNL SERPL-MCNC: 0.2 MG/DL (ref 0–0.5)
BILIRUBIN DIRECT+TOT PNL SERPL-MCNC: 0.2 MG/DL (ref 0–0.8)
BUN SERPL-MCNC: 9.2 MG/DL (ref 8.4–25.7)
CALCIUM SERPL-MCNC: 10 MG/DL (ref 8.8–10)
CHLORIDE SERPL-SCNC: 105 MMOL/L (ref 98–107)
CO2 SERPL-SCNC: 26 MMOL/L (ref 23–31)
CREAT SERPL-MCNC: 0.88 MG/DL (ref 0.73–1.18)
EOSINOPHIL # BLD AUTO: 0.1 X10(3)/MCL (ref 0–0.9)
EOSINOPHIL NFR BLD AUTO: 2.2 %
ERYTHROCYTE [DISTWIDTH] IN BLOOD BY AUTOMATED COUNT: 12.4 % (ref 11.5–17)
GLOBULIN SER-MCNC: 3.5 GM/DL (ref 2.4–3.5)
GLUCOSE SERPL-MCNC: 135 MG/DL (ref 82–115)
HCT VFR BLD AUTO: 42.7 % (ref 42–52)
HGB BLD-MCNC: 13.5 GM/DL (ref 14–18)
LDH SERPL-CCNC: 193 UNIT/L (ref 140–271)
LYMPHOCYTES # BLD AUTO: 0.7 X10(3)/MCL (ref 0.6–4.6)
LYMPHOCYTES NFR BLD AUTO: 12.1 %
MCH RBC QN AUTO: 29.5 PG (ref 27–31)
MCHC RBC AUTO-ENTMCNC: 31.6 GM/DL (ref 33–36)
MCV RBC AUTO: 93.2 FL (ref 80–94)
MONOCYTES # BLD AUTO: 0.5 X10(3)/MCL (ref 0.1–1.3)
MONOCYTES NFR BLD AUTO: 8.8 %
NEUTROPHILS # BLD AUTO: 4.4 X10(3)/MCL (ref 2.1–9.2)
NEUTROPHILS NFR BLD AUTO: 76.2 %
PLATELET # BLD AUTO: 226 X10(3)/MCL (ref 130–400)
PMV BLD AUTO: 7.9 FL (ref 9.4–12.4)
POTASSIUM SERPL-SCNC: 4 MMOL/L (ref 3.5–5.1)
PROT SERPL-MCNC: 7.2 GM/DL (ref 5.8–7.6)
RBC # BLD AUTO: 4.58 X10(6)/MCL (ref 4.7–6.1)
SODIUM SERPL-SCNC: 140 MMOL/L (ref 136–145)
WBC # SPEC AUTO: 5.8 X10(3)/MCL (ref 4.5–11.5)

## 2021-05-05 ENCOUNTER — HISTORICAL (OUTPATIENT)
Dept: SURGERY | Facility: HOSPITAL | Age: 66
End: 2021-05-05

## 2021-05-21 ENCOUNTER — HISTORICAL (OUTPATIENT)
Dept: HEMATOLOGY/ONCOLOGY | Facility: CLINIC | Age: 66
End: 2021-05-21

## 2021-05-21 LAB
ABS NEUT (OLG): 4.65 X10(3)/MCL (ref 2.1–9.2)
ALBUMIN SERPL-MCNC: 3.5 GM/DL (ref 3.4–4.8)
ALBUMIN/GLOB SERPL: 1 RATIO (ref 1.1–2)
ALP SERPL-CCNC: 143 UNIT/L (ref 40–150)
ALT SERPL-CCNC: 12 UNIT/L (ref 0–55)
AST SERPL-CCNC: 14 UNIT/L (ref 5–34)
BASOPHILS # BLD AUTO: 0 X10(3)/MCL (ref 0–0.2)
BASOPHILS NFR BLD AUTO: 0.3 %
BILIRUB SERPL-MCNC: 0.3 MG/DL
BILIRUBIN DIRECT+TOT PNL SERPL-MCNC: 0.1 MG/DL (ref 0–0.8)
BILIRUBIN DIRECT+TOT PNL SERPL-MCNC: 0.2 MG/DL (ref 0–0.5)
BUN SERPL-MCNC: 11.7 MG/DL (ref 8.4–25.7)
CALCIUM SERPL-MCNC: 9.5 MG/DL (ref 8.8–10)
CHLORIDE SERPL-SCNC: 105 MMOL/L (ref 98–107)
CO2 SERPL-SCNC: 26 MMOL/L (ref 23–31)
CREAT SERPL-MCNC: 0.74 MG/DL (ref 0.73–1.18)
EOSINOPHIL # BLD AUTO: 0 X10(3)/MCL (ref 0–0.9)
EOSINOPHIL NFR BLD AUTO: 0.8 %
ERYTHROCYTE [DISTWIDTH] IN BLOOD BY AUTOMATED COUNT: 13.1 % (ref 11.5–17)
GLOBULIN SER-MCNC: 3.5 GM/DL (ref 2.4–3.5)
GLUCOSE SERPL-MCNC: 104 MG/DL (ref 82–115)
HCT VFR BLD AUTO: 40.1 % (ref 42–52)
HGB BLD-MCNC: 13.1 GM/DL (ref 14–18)
LDH SERPL-CCNC: 168 UNIT/L (ref 140–271)
LYMPHOCYTES # BLD AUTO: 0.8 X10(3)/MCL (ref 0.6–4.6)
LYMPHOCYTES NFR BLD AUTO: 13.6 %
MCH RBC QN AUTO: 29.1 PG (ref 27–31)
MCHC RBC AUTO-ENTMCNC: 32.7 GM/DL (ref 33–36)
MCV RBC AUTO: 89.1 FL (ref 80–94)
MONOCYTES # BLD AUTO: 0.6 X10(3)/MCL (ref 0.1–1.3)
MONOCYTES NFR BLD AUTO: 9.9 %
NEUTROPHILS # BLD AUTO: 4.6 X10(3)/MCL (ref 2.1–9.2)
NEUTROPHILS NFR BLD AUTO: 75.1 %
PLATELET # BLD AUTO: 212 X10(3)/MCL (ref 130–400)
PMV BLD AUTO: 7.9 FL (ref 9.4–12.4)
POTASSIUM SERPL-SCNC: 4.2 MMOL/L (ref 3.5–5.1)
PROT SERPL-MCNC: 7 GM/DL (ref 5.8–7.6)
RBC # BLD AUTO: 4.5 X10(6)/MCL (ref 4.7–6.1)
SODIUM SERPL-SCNC: 141 MMOL/L (ref 136–145)
WBC # SPEC AUTO: 6.2 X10(3)/MCL (ref 4.5–11.5)

## 2021-06-02 ENCOUNTER — HISTORICAL (OUTPATIENT)
Dept: ADMINISTRATIVE | Facility: HOSPITAL | Age: 66
End: 2021-06-02

## 2021-06-02 LAB
ABS NEUT (OLG): 4.59 X10(3)/MCL (ref 2.1–9.2)
ALBUMIN SERPL-MCNC: 3.5 GM/DL (ref 3.4–4.8)
ALBUMIN/GLOB SERPL: 1.1 RATIO (ref 1.1–2)
ALP SERPL-CCNC: 148 UNIT/L (ref 40–150)
ALT SERPL-CCNC: 47 UNIT/L (ref 0–55)
AST SERPL-CCNC: 16 UNIT/L (ref 5–34)
BASOPHILS # BLD AUTO: 0 X10(3)/MCL (ref 0–0.2)
BASOPHILS NFR BLD AUTO: 0.2 %
BILIRUB SERPL-MCNC: 0.4 MG/DL
BILIRUBIN DIRECT+TOT PNL SERPL-MCNC: 0.2 MG/DL (ref 0–0.5)
BILIRUBIN DIRECT+TOT PNL SERPL-MCNC: 0.2 MG/DL (ref 0–0.8)
BUN SERPL-MCNC: 10.8 MG/DL (ref 8.4–25.7)
CALCIUM SERPL-MCNC: 9.9 MG/DL (ref 8.8–10)
CHLORIDE SERPL-SCNC: 102 MMOL/L (ref 98–107)
CO2 SERPL-SCNC: 27 MMOL/L (ref 23–31)
CREAT SERPL-MCNC: 0.84 MG/DL (ref 0.73–1.18)
EOSINOPHIL # BLD AUTO: 0 X10(3)/MCL (ref 0–0.9)
EOSINOPHIL NFR BLD AUTO: 0.8 %
ERYTHROCYTE [DISTWIDTH] IN BLOOD BY AUTOMATED COUNT: 13.5 % (ref 11.5–17)
GLOBULIN SER-MCNC: 3.2 GM/DL (ref 2.4–3.5)
GLUCOSE SERPL-MCNC: 98 MG/DL (ref 82–115)
HCT VFR BLD AUTO: 40.6 % (ref 42–52)
HGB BLD-MCNC: 13 GM/DL (ref 14–18)
LDH SERPL-CCNC: 158 UNIT/L (ref 140–271)
LYMPHOCYTES # BLD AUTO: 0.8 X10(3)/MCL (ref 0.6–4.6)
LYMPHOCYTES NFR BLD AUTO: 13.3 %
MCH RBC QN AUTO: 28.6 PG (ref 27–31)
MCHC RBC AUTO-ENTMCNC: 32 GM/DL (ref 33–36)
MCV RBC AUTO: 89.2 FL (ref 80–94)
MONOCYTES # BLD AUTO: 0.6 X10(3)/MCL (ref 0.1–1.3)
MONOCYTES NFR BLD AUTO: 9.9 %
NEUTROPHILS # BLD AUTO: 4.6 X10(3)/MCL (ref 2.1–9.2)
NEUTROPHILS NFR BLD AUTO: 75.3 %
PHOSPHATE SERPL-MCNC: 4 MG/DL (ref 2.3–4.7)
PLATELET # BLD AUTO: 190 X10(3)/MCL (ref 130–400)
PMV BLD AUTO: 7.7 FL (ref 9.4–12.4)
POTASSIUM SERPL-SCNC: 3.9 MMOL/L (ref 3.5–5.1)
PROT SERPL-MCNC: 6.7 GM/DL (ref 5.8–7.6)
RBC # BLD AUTO: 4.55 X10(6)/MCL (ref 4.7–6.1)
SODIUM SERPL-SCNC: 138 MMOL/L (ref 136–145)
URATE SERPL-MCNC: 5.2 MG/DL (ref 3.5–7.2)
WBC # SPEC AUTO: 6.1 X10(3)/MCL (ref 4.5–11.5)

## 2021-06-15 ENCOUNTER — HISTORICAL (OUTPATIENT)
Dept: HEMATOLOGY/ONCOLOGY | Facility: CLINIC | Age: 66
End: 2021-06-15

## 2021-06-15 LAB
ABS NEUT (OLG): 4.63 X10(3)/MCL (ref 2.1–9.2)
ALBUMIN SERPL-MCNC: 3 GM/DL (ref 3.4–4.8)
ALBUMIN/GLOB SERPL: 0.8 RATIO (ref 1.1–2)
ALP SERPL-CCNC: 144 UNIT/L (ref 40–150)
ALT SERPL-CCNC: 12 UNIT/L (ref 0–55)
AST SERPL-CCNC: 13 UNIT/L (ref 5–34)
BASOPHILS # BLD AUTO: 0 X10(3)/MCL (ref 0–0.2)
BASOPHILS NFR BLD AUTO: 0.3 %
BILIRUB SERPL-MCNC: 0.8 MG/DL
BILIRUBIN DIRECT+TOT PNL SERPL-MCNC: 0.4 MG/DL (ref 0–0.5)
BILIRUBIN DIRECT+TOT PNL SERPL-MCNC: 0.4 MG/DL (ref 0–0.8)
BUN SERPL-MCNC: 7 MG/DL (ref 8.4–25.7)
CALCIUM SERPL-MCNC: 9.3 MG/DL (ref 8.8–10)
CHLORIDE SERPL-SCNC: 100 MMOL/L (ref 98–107)
CO2 SERPL-SCNC: 26 MMOL/L (ref 23–31)
CREAT SERPL-MCNC: 0.75 MG/DL (ref 0.73–1.18)
EOSINOPHIL # BLD AUTO: 0 X10(3)/MCL (ref 0–0.9)
EOSINOPHIL NFR BLD AUTO: 0.6 %
ERYTHROCYTE [DISTWIDTH] IN BLOOD BY AUTOMATED COUNT: 13.8 % (ref 11.5–17)
GLOBULIN SER-MCNC: 3.7 GM/DL (ref 2.4–3.5)
GLUCOSE SERPL-MCNC: 101 MG/DL (ref 82–115)
HCT VFR BLD AUTO: 39.3 % (ref 42–52)
HGB BLD-MCNC: 12.7 GM/DL (ref 14–18)
LDH SERPL-CCNC: 169 UNIT/L (ref 140–271)
LYMPHOCYTES # BLD AUTO: 1.6 X10(3)/MCL (ref 0.6–4.6)
LYMPHOCYTES NFR BLD AUTO: 22.5 %
MCH RBC QN AUTO: 28.4 PG (ref 27–31)
MCHC RBC AUTO-ENTMCNC: 32.3 GM/DL (ref 33–36)
MCV RBC AUTO: 87.9 FL (ref 80–94)
MONOCYTES # BLD AUTO: 0.9 X10(3)/MCL (ref 0.1–1.3)
MONOCYTES NFR BLD AUTO: 11.9 %
NEUTROPHILS # BLD AUTO: 4.6 X10(3)/MCL (ref 2.1–9.2)
NEUTROPHILS NFR BLD AUTO: 63.9 %
PHOSPHATE SERPL-MCNC: 3.2 MG/DL (ref 2.3–4.7)
PLATELET # BLD AUTO: 200 X10(3)/MCL (ref 130–400)
PMV BLD AUTO: 8.2 FL (ref 9.4–12.4)
POTASSIUM SERPL-SCNC: 3.6 MMOL/L (ref 3.5–5.1)
PROT SERPL-MCNC: 6.7 GM/DL (ref 5.8–7.6)
RBC # BLD AUTO: 4.47 X10(6)/MCL (ref 4.7–6.1)
SODIUM SERPL-SCNC: 137 MMOL/L (ref 136–145)
URATE SERPL-MCNC: 3.9 MG/DL (ref 3.5–7.2)
WBC # SPEC AUTO: 7.2 X10(3)/MCL (ref 4.5–11.5)

## 2021-06-22 ENCOUNTER — HISTORICAL (OUTPATIENT)
Dept: HEMATOLOGY/ONCOLOGY | Facility: CLINIC | Age: 66
End: 2021-06-22

## 2021-06-22 LAB
ABS NEUT (OLG): 4.43 X10(3)/MCL (ref 2.1–9.2)
ALBUMIN SERPL-MCNC: 3 GM/DL (ref 3.4–4.8)
ALBUMIN/GLOB SERPL: 0.8 RATIO (ref 1.1–2)
ALP SERPL-CCNC: 163 UNIT/L (ref 40–150)
ALT SERPL-CCNC: 13 UNIT/L (ref 0–55)
AST SERPL-CCNC: 12 UNIT/L (ref 5–34)
BASOPHILS # BLD AUTO: 0 X10(3)/MCL (ref 0–0.2)
BASOPHILS NFR BLD AUTO: 0.5 %
BILIRUB SERPL-MCNC: 0.4 MG/DL
BILIRUBIN DIRECT+TOT PNL SERPL-MCNC: 0.2 MG/DL (ref 0–0.5)
BILIRUBIN DIRECT+TOT PNL SERPL-MCNC: 0.2 MG/DL (ref 0–0.8)
BUN SERPL-MCNC: 8.8 MG/DL (ref 8.4–25.7)
CALCIUM SERPL-MCNC: 10.1 MG/DL (ref 8.8–10)
CHLORIDE SERPL-SCNC: 101 MMOL/L (ref 98–107)
CO2 SERPL-SCNC: 29 MMOL/L (ref 23–31)
CREAT SERPL-MCNC: 0.81 MG/DL (ref 0.73–1.18)
EOSINOPHIL # BLD AUTO: 0 X10(3)/MCL (ref 0–0.9)
EOSINOPHIL NFR BLD AUTO: 0.2 %
ERYTHROCYTE [DISTWIDTH] IN BLOOD BY AUTOMATED COUNT: 13.7 % (ref 11.5–17)
GLOBULIN SER-MCNC: 3.9 GM/DL (ref 2.4–3.5)
GLUCOSE SERPL-MCNC: 122 MG/DL (ref 82–115)
HCT VFR BLD AUTO: 40.9 % (ref 42–52)
HGB BLD-MCNC: 13 GM/DL (ref 14–18)
LDH SERPL-CCNC: 178 UNIT/L (ref 140–271)
LYMPHOCYTES # BLD AUTO: 1.1 X10(3)/MCL (ref 0.6–4.6)
LYMPHOCYTES NFR BLD AUTO: 17.4 %
MCH RBC QN AUTO: 27.8 PG (ref 27–31)
MCHC RBC AUTO-ENTMCNC: 31.8 GM/DL (ref 33–36)
MCV RBC AUTO: 87.4 FL (ref 80–94)
MONOCYTES # BLD AUTO: 0.5 X10(3)/MCL (ref 0.1–1.3)
MONOCYTES NFR BLD AUTO: 8.5 %
NEUTROPHILS # BLD AUTO: 4.4 X10(3)/MCL (ref 2.1–9.2)
NEUTROPHILS NFR BLD AUTO: 72.7 %
PHOSPHATE SERPL-MCNC: 3.6 MG/DL (ref 2.3–4.7)
PLATELET # BLD AUTO: 298 X10(3)/MCL (ref 130–400)
PMV BLD AUTO: 8.4 FL (ref 9.4–12.4)
POTASSIUM SERPL-SCNC: 3.7 MMOL/L (ref 3.5–5.1)
PROT SERPL-MCNC: 6.9 GM/DL (ref 5.8–7.6)
RBC # BLD AUTO: 4.68 X10(6)/MCL (ref 4.7–6.1)
SODIUM SERPL-SCNC: 140 MMOL/L (ref 136–145)
URATE SERPL-MCNC: 5 MG/DL (ref 3.5–7.2)
WBC # SPEC AUTO: 6.1 X10(3)/MCL (ref 4.5–11.5)

## 2021-07-13 ENCOUNTER — HISTORICAL (OUTPATIENT)
Dept: HEMATOLOGY/ONCOLOGY | Facility: CLINIC | Age: 66
End: 2021-07-13

## 2021-07-13 LAB
ABS NEUT (OLG): 4.05 X10(3)/MCL (ref 2.1–9.2)
ALBUMIN SERPL-MCNC: 2.8 GM/DL (ref 3.4–4.8)
ALBUMIN/GLOB SERPL: 0.8 RATIO (ref 1.1–2)
ALP SERPL-CCNC: 145 UNIT/L (ref 40–150)
ALT SERPL-CCNC: 13 UNIT/L (ref 0–55)
AST SERPL-CCNC: 13 UNIT/L (ref 5–34)
BASOPHILS # BLD AUTO: 0 X10(3)/MCL (ref 0–0.2)
BASOPHILS NFR BLD AUTO: 0.2 %
BILIRUB SERPL-MCNC: 0.5 MG/DL
BILIRUBIN DIRECT+TOT PNL SERPL-MCNC: 0.2 MG/DL (ref 0–0.8)
BILIRUBIN DIRECT+TOT PNL SERPL-MCNC: 0.3 MG/DL (ref 0–0.5)
BUN SERPL-MCNC: 7.9 MG/DL (ref 8.4–25.7)
CALCIUM SERPL-MCNC: 9.1 MG/DL (ref 8.8–10)
CHLORIDE SERPL-SCNC: 102 MMOL/L (ref 98–107)
CO2 SERPL-SCNC: 26 MMOL/L (ref 23–31)
CREAT SERPL-MCNC: 0.79 MG/DL (ref 0.73–1.18)
EOSINOPHIL # BLD AUTO: 0 X10(3)/MCL (ref 0–0.9)
EOSINOPHIL NFR BLD AUTO: 0.4 %
ERYTHROCYTE [DISTWIDTH] IN BLOOD BY AUTOMATED COUNT: 14.5 % (ref 11.5–17)
GLOBULIN SER-MCNC: 3.7 GM/DL (ref 2.4–3.5)
GLUCOSE SERPL-MCNC: 87 MG/DL (ref 82–115)
HCT VFR BLD AUTO: 37.6 % (ref 42–52)
HGB BLD-MCNC: 12.1 GM/DL (ref 14–18)
LDH SERPL-CCNC: 161 UNIT/L (ref 140–271)
LYMPHOCYTES # BLD AUTO: 0.7 X10(3)/MCL (ref 0.6–4.6)
LYMPHOCYTES NFR BLD AUTO: 12.8 %
MCH RBC QN AUTO: 28.1 PG (ref 27–31)
MCHC RBC AUTO-ENTMCNC: 32.2 GM/DL (ref 33–36)
MCV RBC AUTO: 87.2 FL (ref 80–94)
MONOCYTES # BLD AUTO: 0.8 X10(3)/MCL (ref 0.1–1.3)
MONOCYTES NFR BLD AUTO: 13.7 %
NEUTROPHILS # BLD AUTO: 4 X10(3)/MCL (ref 2.1–9.2)
NEUTROPHILS NFR BLD AUTO: 72.2 %
PHOSPHATE SERPL-MCNC: 3.4 MG/DL (ref 2.3–4.7)
PLATELET # BLD AUTO: 259 X10(3)/MCL (ref 130–400)
PMV BLD AUTO: 8.5 FL (ref 9.4–12.4)
POTASSIUM SERPL-SCNC: 3.8 MMOL/L (ref 3.5–5.1)
PROT SERPL-MCNC: 6.5 GM/DL (ref 5.8–7.6)
RBC # BLD AUTO: 4.31 X10(6)/MCL (ref 4.7–6.1)
SODIUM SERPL-SCNC: 139 MMOL/L (ref 136–145)
URATE SERPL-MCNC: 4.8 MG/DL (ref 3.5–7.2)
WBC # SPEC AUTO: 5.6 X10(3)/MCL (ref 4.5–11.5)

## 2021-07-29 ENCOUNTER — HISTORICAL (OUTPATIENT)
Dept: ADMINISTRATIVE | Facility: HOSPITAL | Age: 66
End: 2021-07-29

## 2021-07-29 LAB
ABS NEUT (OLG): 5.14 X10(3)/MCL (ref 2.1–9.2)
ALBUMIN SERPL-MCNC: 2.8 GM/DL (ref 3.4–4.8)
ALBUMIN/GLOB SERPL: 0.7 RATIO (ref 1.1–2)
ALP SERPL-CCNC: 176 UNIT/L (ref 40–150)
ALT SERPL-CCNC: 14 UNIT/L (ref 0–55)
AST SERPL-CCNC: 14 UNIT/L (ref 5–34)
BASOPHILS # BLD AUTO: 0 X10(3)/MCL (ref 0–0.2)
BASOPHILS NFR BLD AUTO: 0.3 %
BILIRUB SERPL-MCNC: 0.5 MG/DL
BILIRUBIN DIRECT+TOT PNL SERPL-MCNC: 0.2 MG/DL (ref 0–0.5)
BILIRUBIN DIRECT+TOT PNL SERPL-MCNC: 0.3 MG/DL (ref 0–0.8)
BUN SERPL-MCNC: 8.4 MG/DL (ref 8.4–25.7)
CALCIUM SERPL-MCNC: 9.6 MG/DL (ref 8.8–10)
CHLORIDE SERPL-SCNC: 102 MMOL/L (ref 98–107)
CO2 SERPL-SCNC: 27 MMOL/L (ref 23–31)
CREAT SERPL-MCNC: 0.86 MG/DL (ref 0.73–1.18)
EOSINOPHIL # BLD AUTO: 0 X10(3)/MCL (ref 0–0.9)
EOSINOPHIL NFR BLD AUTO: 0.3 %
ERYTHROCYTE [DISTWIDTH] IN BLOOD BY AUTOMATED COUNT: 14.5 % (ref 11.5–17)
GLOBULIN SER-MCNC: 3.9 GM/DL (ref 2.4–3.5)
GLUCOSE SERPL-MCNC: 127 MG/DL (ref 82–115)
HCT VFR BLD AUTO: 38.2 % (ref 42–52)
HGB BLD-MCNC: 11.9 GM/DL (ref 14–18)
LDH SERPL-CCNC: 210 UNIT/L (ref 140–271)
LYMPHOCYTES # BLD AUTO: 0.6 X10(3)/MCL (ref 0.6–4.6)
LYMPHOCYTES NFR BLD AUTO: 9.3 %
MCH RBC QN AUTO: 27.9 PG (ref 27–31)
MCHC RBC AUTO-ENTMCNC: 31.2 GM/DL (ref 33–36)
MCV RBC AUTO: 89.7 FL (ref 80–94)
MONOCYTES # BLD AUTO: 0.6 X10(3)/MCL (ref 0.1–1.3)
MONOCYTES NFR BLD AUTO: 9.9 %
NEUTROPHILS # BLD AUTO: 5.1 X10(3)/MCL (ref 2.1–9.2)
NEUTROPHILS NFR BLD AUTO: 78.5 %
PHOSPHATE SERPL-MCNC: 2.9 MG/DL (ref 2.3–4.7)
PLATELET # BLD AUTO: 280 X10(3)/MCL (ref 130–400)
PMV BLD AUTO: 8.4 FL (ref 9.4–12.4)
POTASSIUM SERPL-SCNC: 4 MMOL/L (ref 3.5–5.1)
PROT SERPL-MCNC: 6.7 GM/DL (ref 5.8–7.6)
RBC # BLD AUTO: 4.26 X10(6)/MCL (ref 4.7–6.1)
SODIUM SERPL-SCNC: 139 MMOL/L (ref 136–145)
URATE SERPL-MCNC: 4.9 MG/DL (ref 3.5–7.2)
WBC # SPEC AUTO: 6.6 X10(3)/MCL (ref 4.5–11.5)

## 2021-08-04 ENCOUNTER — HISTORICAL (OUTPATIENT)
Dept: RADIOLOGY | Facility: HOSPITAL | Age: 66
End: 2021-08-04

## 2021-08-06 ENCOUNTER — HISTORICAL (OUTPATIENT)
Dept: ADMINISTRATIVE | Facility: HOSPITAL | Age: 66
End: 2021-08-06

## 2021-08-06 LAB
ABS NEUT (OLG): 3.09 X10(3)/MCL (ref 2.1–9.2)
ALBUMIN SERPL-MCNC: 2.8 GM/DL (ref 3.4–4.8)
ALBUMIN/GLOB SERPL: 0.8 RATIO (ref 1.1–2)
ALP SERPL-CCNC: 149 UNIT/L (ref 40–150)
ALT SERPL-CCNC: 13 UNIT/L (ref 0–55)
AST SERPL-CCNC: 16 UNIT/L (ref 5–34)
BASOPHILS # BLD AUTO: 0 X10(3)/MCL (ref 0–0.2)
BASOPHILS NFR BLD AUTO: 0.2 %
BILIRUB SERPL-MCNC: 0.5 MG/DL
BILIRUBIN DIRECT+TOT PNL SERPL-MCNC: 0.2 MG/DL (ref 0–0.8)
BILIRUBIN DIRECT+TOT PNL SERPL-MCNC: 0.3 MG/DL (ref 0–0.5)
BUN SERPL-MCNC: 8.2 MG/DL (ref 8.4–25.7)
CALCIUM SERPL-MCNC: 9.7 MG/DL (ref 8.8–10)
CHLORIDE SERPL-SCNC: 101 MMOL/L (ref 98–107)
CO2 SERPL-SCNC: 24 MMOL/L (ref 23–31)
CREAT SERPL-MCNC: 0.75 MG/DL (ref 0.73–1.18)
EOSINOPHIL # BLD AUTO: 0 X10(3)/MCL (ref 0–0.9)
EOSINOPHIL NFR BLD AUTO: 0.2 %
ERYTHROCYTE [DISTWIDTH] IN BLOOD BY AUTOMATED COUNT: 14.3 % (ref 11.5–17)
GLOBULIN SER-MCNC: 3.4 GM/DL (ref 2.4–3.5)
GLUCOSE SERPL-MCNC: 107 MG/DL (ref 82–115)
HCT VFR BLD AUTO: 35.4 % (ref 42–52)
HGB BLD-MCNC: 10.9 GM/DL (ref 14–18)
LDH SERPL-CCNC: 271 UNIT/L (ref 140–271)
LYMPHOCYTES # BLD AUTO: 0.5 X10(3)/MCL (ref 0.6–4.6)
LYMPHOCYTES NFR BLD AUTO: 10 %
LYMPHOCYTES NFR BLD MANUAL: 11 % (ref 13–40)
MCH RBC QN AUTO: 27.6 PG (ref 27–31)
MCHC RBC AUTO-ENTMCNC: 30.8 GM/DL (ref 33–36)
MCV RBC AUTO: 89.6 FL (ref 80–94)
METAMYELOCYTES NFR BLD MANUAL: 4 %
MONOCYTES # BLD AUTO: 0.8 X10(3)/MCL (ref 0.1–1.3)
MONOCYTES NFR BLD AUTO: 16.4 %
MONOCYTES NFR BLD MANUAL: 17 % (ref 2–11)
MYELOCYTES NFR BLD MANUAL: 2 %
NEUTROPHILS # BLD AUTO: 3.1 X10(3)/MCL (ref 2.1–9.2)
NEUTROPHILS NFR BLD AUTO: 67.5 %
NEUTROPHILS NFR BLD MANUAL: 66 % (ref 47–80)
PLATELET # BLD AUTO: 288 X10(3)/MCL (ref 130–400)
PLATELET # BLD EST: NORMAL 10*3/UL
PMV BLD AUTO: 8.2 FL (ref 9.4–12.4)
POTASSIUM SERPL-SCNC: 3.8 MMOL/L (ref 3.5–5.1)
PROT SERPL-MCNC: 6.2 GM/DL (ref 5.8–7.6)
RBC # BLD AUTO: 3.95 X10(6)/MCL (ref 4.7–6.1)
RBC MORPH BLD: NORMAL
SODIUM SERPL-SCNC: 138 MMOL/L (ref 136–145)
URATE SERPL-MCNC: 4.6 MG/DL (ref 3.5–7.2)
WBC # SPEC AUTO: 4.6 X10(3)/MCL (ref 4.5–11.5)

## 2021-08-10 ENCOUNTER — HISTORICAL (OUTPATIENT)
Dept: PREADMISSION TESTING | Facility: HOSPITAL | Age: 66
End: 2021-08-10

## 2021-08-10 LAB
ABS NEUT (OLG): 2.66 X10(3)/MCL (ref 2.1–9.2)
ALBUMIN SERPL-MCNC: 3 GM/DL (ref 3.4–4.8)
ALBUMIN/GLOB SERPL: 0.8 RATIO (ref 1.1–2)
ALP SERPL-CCNC: 163 UNIT/L (ref 40–150)
ALT SERPL-CCNC: 16 UNIT/L (ref 0–55)
AST SERPL-CCNC: 22 UNIT/L (ref 5–34)
BASOPHILS NFR BLD MANUAL: 1 % (ref 0–2)
BILIRUB SERPL-MCNC: 0.3 MG/DL
BILIRUBIN DIRECT+TOT PNL SERPL-MCNC: 0.1 MG/DL (ref 0–0.8)
BILIRUBIN DIRECT+TOT PNL SERPL-MCNC: 0.2 MG/DL (ref 0–0.5)
BUN SERPL-MCNC: 8.5 MG/DL (ref 8.4–25.7)
CALCIUM SERPL-MCNC: 9.7 MG/DL (ref 8.8–10)
CHLORIDE SERPL-SCNC: 102 MMOL/L (ref 98–107)
CO2 SERPL-SCNC: 25 MMOL/L (ref 23–31)
CREAT SERPL-MCNC: 0.78 MG/DL (ref 0.73–1.18)
EOSINOPHIL NFR BLD MANUAL: 1 % (ref 0–8)
ERYTHROCYTE [DISTWIDTH] IN BLOOD BY AUTOMATED COUNT: 14.6 % (ref 11.5–17)
GLOBULIN SER-MCNC: 3.6 GM/DL (ref 2.4–3.5)
GLUCOSE SERPL-MCNC: 89 MG/DL (ref 82–115)
HCT VFR BLD AUTO: 39.4 % (ref 42–52)
HGB BLD-MCNC: 12.1 GM/DL (ref 14–18)
HYPOCHROMIA BLD QL SMEAR: 1
LYMPHOCYTES NFR BLD MANUAL: 5 % (ref 13–40)
MCH RBC QN AUTO: 27.3 PG (ref 27–31)
MCHC RBC AUTO-ENTMCNC: 30.7 GM/DL (ref 33–36)
MCV RBC AUTO: 88.9 FL (ref 80–94)
MONOCYTES NFR BLD MANUAL: 14 % (ref 2–11)
NEUTROPHILS NFR BLD MANUAL: 78 % (ref 47–80)
NRBC BLD MANUAL-RTO: 1 %
PLATELET # BLD AUTO: 405 X10(3)/MCL (ref 130–400)
PLATELET # BLD EST: ABNORMAL 10*3/UL
PMV BLD AUTO: 8.9 FL (ref 7.4–10.4)
POTASSIUM SERPL-SCNC: 4 MMOL/L (ref 3.5–5.1)
PROT SERPL-MCNC: 6.6 GM/DL (ref 5.8–7.6)
RBC # BLD AUTO: 4.43 X10(6)/MCL (ref 4.7–6.1)
RBC MORPH BLD: ABNORMAL
SODIUM SERPL-SCNC: 142 MMOL/L (ref 136–145)
WBC # SPEC AUTO: 3.9 X10(3)/MCL (ref 4.5–11.5)

## 2021-08-13 LAB — SARS-COV-2 RNA RESP QL NAA+PROBE: NOT DETECTED

## 2021-08-18 ENCOUNTER — HISTORICAL (OUTPATIENT)
Dept: SURGERY | Facility: HOSPITAL | Age: 66
End: 2021-08-18

## 2021-08-27 ENCOUNTER — HISTORICAL (OUTPATIENT)
Dept: ADMINISTRATIVE | Facility: HOSPITAL | Age: 66
End: 2021-08-27

## 2021-08-27 LAB
ABS NEUT (OLG): 1.59 X10(3)/MCL (ref 2.1–9.2)
ALBUMIN SERPL-MCNC: 2.8 GM/DL (ref 3.4–4.8)
ALBUMIN/GLOB SERPL: 0.9 RATIO (ref 1.1–2)
ALP SERPL-CCNC: 160 UNIT/L (ref 40–150)
ALT SERPL-CCNC: 16 UNIT/L (ref 0–55)
AST SERPL-CCNC: 19 UNIT/L (ref 5–34)
BASOPHILS # BLD AUTO: 0 X10(3)/MCL (ref 0–0.2)
BASOPHILS NFR BLD AUTO: 0.3 %
BILIRUB SERPL-MCNC: 0.5 MG/DL
BILIRUBIN DIRECT+TOT PNL SERPL-MCNC: 0.2 MG/DL (ref 0–0.8)
BILIRUBIN DIRECT+TOT PNL SERPL-MCNC: 0.3 MG/DL (ref 0–0.5)
BUN SERPL-MCNC: 8 MG/DL (ref 8.4–25.7)
CALCIUM SERPL-MCNC: 9.4 MG/DL (ref 8.8–10)
CHLORIDE SERPL-SCNC: 102 MMOL/L (ref 98–107)
CO2 SERPL-SCNC: 27 MMOL/L (ref 23–31)
CREAT SERPL-MCNC: 0.72 MG/DL (ref 0.73–1.18)
EOSINOPHIL # BLD AUTO: 0 X10(3)/MCL (ref 0–0.9)
EOSINOPHIL NFR BLD AUTO: 0.7 %
ERYTHROCYTE [DISTWIDTH] IN BLOOD BY AUTOMATED COUNT: 14.6 % (ref 11.5–17)
ERYTHROCYTE [SEDIMENTATION RATE] IN BLOOD: 83 MM/HR (ref 0–15)
GLOBULIN SER-MCNC: 3.1 GM/DL (ref 2.4–3.5)
GLUCOSE SERPL-MCNC: 100 MG/DL (ref 82–115)
HBV CORE IGM SERPL QL IA: NONREACTIVE
HBV SURFACE AB SER-ACNC: 0.3 MIU/ML
HBV SURFACE AB SERPL IA-ACNC: NONREACTIVE M[IU]/ML
HBV SURFACE AG SERPL QL IA: NONREACTIVE
HCT VFR BLD AUTO: 35.4 % (ref 42–52)
HCV AB SERPL QL IA: REACTIVE
HGB BLD-MCNC: 11.1 GM/DL (ref 14–18)
HIV 1+2 AB+HIV1 P24 AG SERPL QL IA: NONREACTIVE
LDH SERPL-CCNC: 228 UNIT/L (ref 140–271)
LYMPHOCYTES # BLD AUTO: 0.4 X10(3)/MCL (ref 0.6–4.6)
LYMPHOCYTES NFR BLD AUTO: 13.4 %
MCH RBC QN AUTO: 28 PG (ref 27–31)
MCHC RBC AUTO-ENTMCNC: 31.4 GM/DL (ref 33–36)
MCV RBC AUTO: 89.2 FL (ref 80–94)
MONOCYTES # BLD AUTO: 0.7 X10(3)/MCL (ref 0.1–1.3)
MONOCYTES NFR BLD AUTO: 24.8 %
NEUTROPHILS # BLD AUTO: 1.6 X10(3)/MCL (ref 2.1–9.2)
NEUTROPHILS NFR BLD AUTO: 53.4 %
PLATELET # BLD AUTO: 252 X10(3)/MCL (ref 130–400)
PMV BLD AUTO: 8.3 FL (ref 9.4–12.4)
POTASSIUM SERPL-SCNC: 3.6 MMOL/L (ref 3.5–5.1)
PROT SERPL-MCNC: 5.9 GM/DL (ref 5.8–7.6)
RBC # BLD AUTO: 3.97 X10(6)/MCL (ref 4.7–6.1)
SODIUM SERPL-SCNC: 137 MMOL/L (ref 136–145)
URATE SERPL-MCNC: 4.3 MG/DL (ref 3.5–7.2)
WBC # SPEC AUTO: 3 X10(3)/MCL (ref 4.5–11.5)

## 2021-09-03 LAB — SARS-COV-2 RNA RESP QL NAA+PROBE: NOT DETECTED

## 2021-09-07 ENCOUNTER — HISTORICAL (OUTPATIENT)
Dept: RESPIRATORY THERAPY | Facility: HOSPITAL | Age: 66
End: 2021-09-07

## 2021-09-08 ENCOUNTER — HISTORICAL (OUTPATIENT)
Dept: SURGERY | Facility: HOSPITAL | Age: 66
End: 2021-09-08

## 2021-09-08 ENCOUNTER — HISTORICAL (OUTPATIENT)
Dept: INFUSION THERAPY | Facility: HOSPITAL | Age: 66
End: 2021-09-08

## 2021-09-15 ENCOUNTER — HISTORICAL (OUTPATIENT)
Dept: HEMATOLOGY/ONCOLOGY | Facility: CLINIC | Age: 66
End: 2021-09-15

## 2021-09-15 LAB
ABS NEUT (OLG): 1.83 X10(3)/MCL (ref 2.1–9.2)
ALBUMIN SERPL-MCNC: 3.2 GM/DL (ref 3.4–4.8)
ALBUMIN/GLOB SERPL: 1 RATIO (ref 1.1–2)
ALP SERPL-CCNC: 119 UNIT/L (ref 40–150)
ALT SERPL-CCNC: 34 UNIT/L (ref 0–55)
AST SERPL-CCNC: 36 UNIT/L (ref 5–34)
BASOPHILS # BLD AUTO: 0 X10(3)/MCL (ref 0–0.2)
BASOPHILS NFR BLD AUTO: 0.9 %
BILIRUB SERPL-MCNC: 0.3 MG/DL
BILIRUBIN DIRECT+TOT PNL SERPL-MCNC: 0.1 MG/DL (ref 0–0.8)
BILIRUBIN DIRECT+TOT PNL SERPL-MCNC: 0.2 MG/DL (ref 0–0.5)
BUN SERPL-MCNC: 9.9 MG/DL (ref 8.4–25.7)
CALCIUM SERPL-MCNC: 11.2 MG/DL (ref 8.8–10)
CHLORIDE SERPL-SCNC: 105 MMOL/L (ref 98–107)
CO2 SERPL-SCNC: 30 MMOL/L (ref 23–31)
CREAT SERPL-MCNC: 0.81 MG/DL (ref 0.73–1.18)
EOSINOPHIL # BLD AUTO: 0.1 X10(3)/MCL (ref 0–0.9)
EOSINOPHIL NFR BLD AUTO: 5.5 %
ERYTHROCYTE [DISTWIDTH] IN BLOOD BY AUTOMATED COUNT: 14.6 % (ref 11.5–17)
GLOBULIN SER-MCNC: 3.1 GM/DL (ref 2.4–3.5)
GLUCOSE SERPL-MCNC: 115 MG/DL (ref 82–115)
HCT VFR BLD AUTO: 36.8 % (ref 42–52)
HGB BLD-MCNC: 11.2 GM/DL (ref 14–18)
LYMPHOCYTES # BLD AUTO: 0.2 X10(3)/MCL (ref 0.6–4.6)
LYMPHOCYTES NFR BLD AUTO: 10.6 %
MCH RBC QN AUTO: 27.2 PG (ref 27–31)
MCHC RBC AUTO-ENTMCNC: 30.4 GM/DL (ref 33–36)
MCV RBC AUTO: 89.3 FL (ref 80–94)
MONOCYTES # BLD AUTO: 0.1 X10(3)/MCL (ref 0.1–1.3)
MONOCYTES NFR BLD AUTO: 3.8 %
NEUTROPHILS # BLD AUTO: 1.8 X10(3)/MCL (ref 2.1–9.2)
NEUTROPHILS NFR BLD AUTO: 77.9 %
PLATELET # BLD AUTO: 177 X10(3)/MCL (ref 130–400)
PMV BLD AUTO: 8.3 FL (ref 9.4–12.4)
POTASSIUM SERPL-SCNC: 3.6 MMOL/L (ref 3.5–5.1)
PROT SERPL-MCNC: 6.3 GM/DL (ref 5.8–7.6)
RBC # BLD AUTO: 4.12 X10(6)/MCL (ref 4.7–6.1)
SODIUM SERPL-SCNC: 143 MMOL/L (ref 136–145)
WBC # SPEC AUTO: 2.4 X10(3)/MCL (ref 4.5–11.5)

## 2021-09-20 ENCOUNTER — HISTORICAL (OUTPATIENT)
Dept: INFUSION THERAPY | Facility: HOSPITAL | Age: 66
End: 2021-09-20

## 2021-09-20 LAB
ABS NEUT (OLG): 0.18 X10(3)/MCL (ref 2.1–9.2)
ALBUMIN SERPL-MCNC: 3.4 GM/DL (ref 3.4–4.8)
ALBUMIN/GLOB SERPL: 1.1 RATIO (ref 1.1–2)
ALP SERPL-CCNC: 120 UNIT/L (ref 40–150)
ALT SERPL-CCNC: 26 UNIT/L (ref 0–55)
AST SERPL-CCNC: 31 UNIT/L (ref 5–34)
BASOPHILS # BLD AUTO: 0 X10(3)/MCL (ref 0–0.2)
BASOPHILS NFR BLD AUTO: 2.2 %
BILIRUB SERPL-MCNC: 0.3 MG/DL
BILIRUBIN DIRECT+TOT PNL SERPL-MCNC: 0.1 MG/DL (ref 0–0.5)
BILIRUBIN DIRECT+TOT PNL SERPL-MCNC: 0.2 MG/DL (ref 0–0.8)
BUN SERPL-MCNC: 14.6 MG/DL (ref 8.4–25.7)
CALCIUM SERPL-MCNC: 12.4 MG/DL (ref 8.8–10)
CHLORIDE SERPL-SCNC: 103 MMOL/L (ref 98–107)
CO2 SERPL-SCNC: 28 MMOL/L (ref 23–31)
CREAT SERPL-MCNC: 1.19 MG/DL (ref 0.73–1.18)
EOSINOPHIL # BLD AUTO: 0.1 X10(3)/MCL (ref 0–0.9)
EOSINOPHIL NFR BLD AUTO: 8.8 %
ERYTHROCYTE [DISTWIDTH] IN BLOOD BY AUTOMATED COUNT: 15.6 % (ref 11.5–17)
GLOBULIN SER-MCNC: 3.2 GM/DL (ref 2.4–3.5)
GLUCOSE SERPL-MCNC: 149 MG/DL (ref 82–115)
HCT VFR BLD AUTO: 39.9 % (ref 42–52)
HGB BLD-MCNC: 12.2 GM/DL (ref 14–18)
LDH SERPL-CCNC: 207 UNIT/L (ref 140–271)
LYMPHOCYTES # BLD AUTO: 0.3 X10(3)/MCL (ref 0.6–4.6)
LYMPHOCYTES NFR BLD AUTO: 29.7 %
MCH RBC QN AUTO: 27.4 PG (ref 27–31)
MCHC RBC AUTO-ENTMCNC: 30.6 GM/DL (ref 33–36)
MCV RBC AUTO: 89.7 FL (ref 80–94)
MONOCYTES # BLD AUTO: 0.4 X10(3)/MCL (ref 0.1–1.3)
MONOCYTES NFR BLD AUTO: 38.5 %
NEUTROPHILS # BLD AUTO: 0.2 X10(3)/MCL (ref 2.1–9.2)
NEUTROPHILS NFR BLD AUTO: 19.7 %
PLATELET # BLD AUTO: 261 X10(3)/MCL (ref 130–400)
PMV BLD AUTO: 8.2 FL (ref 9.4–12.4)
POTASSIUM SERPL-SCNC: 3.5 MMOL/L (ref 3.5–5.1)
PROT SERPL-MCNC: 6.6 GM/DL (ref 5.8–7.6)
RBC # BLD AUTO: 4.45 X10(6)/MCL (ref 4.7–6.1)
SODIUM SERPL-SCNC: 142 MMOL/L (ref 136–145)
URATE SERPL-MCNC: 5.3 MG/DL (ref 3.5–7.2)
WBC # SPEC AUTO: 0.9 X10(3)/MCL (ref 4.5–11.5)

## 2021-09-22 ENCOUNTER — HISTORICAL (OUTPATIENT)
Dept: INFUSION THERAPY | Facility: HOSPITAL | Age: 66
End: 2021-09-22

## 2021-10-04 ENCOUNTER — HISTORICAL (OUTPATIENT)
Dept: ADMINISTRATIVE | Facility: HOSPITAL | Age: 66
End: 2021-10-04

## 2021-10-04 LAB
ABS NEUT (OLG): 0.1 X10(3)/MCL (ref 2.1–9.2)
ALBUMIN SERPL-MCNC: 3.5 GM/DL (ref 3.4–4.8)
ALBUMIN/GLOB SERPL: 1.2 RATIO (ref 1.1–2)
ALP SERPL-CCNC: 86 UNIT/L (ref 40–150)
ALT SERPL-CCNC: 22 UNIT/L (ref 0–55)
AST SERPL-CCNC: 25 UNIT/L (ref 5–34)
BASOPHILS # BLD AUTO: 0 X10(3)/MCL (ref 0–0.2)
BASOPHILS NFR BLD AUTO: 2 %
BILIRUB SERPL-MCNC: 0.3 MG/DL
BILIRUBIN DIRECT+TOT PNL SERPL-MCNC: 0.1 MG/DL (ref 0–0.8)
BILIRUBIN DIRECT+TOT PNL SERPL-MCNC: 0.2 MG/DL (ref 0–0.5)
BUN SERPL-MCNC: 11.9 MG/DL (ref 8.4–25.7)
CALCIUM SERPL-MCNC: 10.7 MG/DL (ref 8.8–10)
CHLORIDE SERPL-SCNC: 101 MMOL/L (ref 98–107)
CO2 SERPL-SCNC: 26 MMOL/L (ref 23–31)
CREAT SERPL-MCNC: 0.85 MG/DL (ref 0.73–1.18)
EOSINOPHIL # BLD AUTO: 0.1 X10(3)/MCL (ref 0–0.9)
EOSINOPHIL NFR BLD AUTO: 8 %
ERYTHROCYTE [DISTWIDTH] IN BLOOD BY AUTOMATED COUNT: 16.6 % (ref 11.5–17)
GLOBULIN SER-MCNC: 2.9 GM/DL (ref 2.4–3.5)
GLUCOSE SERPL-MCNC: 105 MG/DL (ref 82–115)
HCT VFR BLD AUTO: 36.2 % (ref 42–52)
HGB BLD-MCNC: 11.3 GM/DL (ref 14–18)
LYMPHOCYTES # BLD AUTO: 0.4 X10(3)/MCL (ref 0.6–4.6)
LYMPHOCYTES NFR BLD AUTO: 39 %
MCH RBC QN AUTO: 27.8 PG (ref 27–31)
MCHC RBC AUTO-ENTMCNC: 31.2 GM/DL (ref 33–36)
MCV RBC AUTO: 88.9 FL (ref 80–94)
MONOCYTES # BLD AUTO: 0.4 X10(3)/MCL (ref 0.1–1.3)
MONOCYTES NFR BLD AUTO: 40 %
NEUTROPHILS # BLD AUTO: 0.1 X10(3)/MCL (ref 2.1–9.2)
NEUTROPHILS NFR BLD AUTO: 10 %
PLATELET # BLD AUTO: 253 X10(3)/MCL (ref 130–400)
PMV BLD AUTO: 7.9 FL (ref 9.4–12.4)
POTASSIUM SERPL-SCNC: 3.7 MMOL/L (ref 3.5–5.1)
PROT SERPL-MCNC: 6.4 GM/DL (ref 5.8–7.6)
RBC # BLD AUTO: 4.07 X10(6)/MCL (ref 4.7–6.1)
SODIUM SERPL-SCNC: 140 MMOL/L (ref 136–145)
WBC # SPEC AUTO: 1 X10(3)/MCL (ref 4.5–11.5)

## 2021-10-06 ENCOUNTER — HISTORICAL (OUTPATIENT)
Dept: INFUSION THERAPY | Facility: HOSPITAL | Age: 66
End: 2021-10-06

## 2021-10-18 ENCOUNTER — HISTORICAL (OUTPATIENT)
Dept: ADMINISTRATIVE | Facility: HOSPITAL | Age: 66
End: 2021-10-18

## 2021-10-18 LAB
ABS NEUT (OLG): 0.67 X10(3)/MCL (ref 2.1–9.2)
ALBUMIN SERPL-MCNC: 3.5 GM/DL (ref 3.4–4.8)
ALBUMIN/GLOB SERPL: 1.3 RATIO (ref 1.1–2)
ALP SERPL-CCNC: 80 UNIT/L (ref 40–150)
ALT SERPL-CCNC: 23 UNIT/L (ref 0–55)
ANISOCYTOSIS BLD QL SMEAR: 1
AST SERPL-CCNC: 24 UNIT/L (ref 5–34)
BASOPHILS # BLD AUTO: 0 X10(3)/MCL (ref 0–0.2)
BASOPHILS NFR BLD AUTO: 1.1 %
BILIRUB SERPL-MCNC: 0.2 MG/DL
BILIRUBIN DIRECT+TOT PNL SERPL-MCNC: 0.1 MG/DL (ref 0–0.5)
BILIRUBIN DIRECT+TOT PNL SERPL-MCNC: 0.1 MG/DL (ref 0–0.8)
BUN SERPL-MCNC: 11.2 MG/DL (ref 8.4–25.7)
CALCIUM SERPL-MCNC: 10 MG/DL (ref 8.7–10.5)
CHLORIDE SERPL-SCNC: 105 MMOL/L (ref 98–107)
CO2 SERPL-SCNC: 29 MMOL/L (ref 23–31)
CREAT SERPL-MCNC: 0.7 MG/DL (ref 0.73–1.18)
EOSINOPHIL # BLD AUTO: 0.2 X10(3)/MCL (ref 0–0.9)
EOSINOPHIL NFR BLD AUTO: 12.6 %
EOSINOPHIL NFR BLD MANUAL: 18 % (ref 0–8)
ERYTHROCYTE [DISTWIDTH] IN BLOOD BY AUTOMATED COUNT: 18.3 % (ref 11.5–17)
GLOBULIN SER-MCNC: 2.7 GM/DL (ref 2.4–3.5)
GLUCOSE SERPL-MCNC: 99 MG/DL (ref 82–115)
HCT VFR BLD AUTO: 36.3 % (ref 42–52)
HGB BLD-MCNC: 11.4 GM/DL (ref 14–18)
HYPOCHROMIA BLD QL SMEAR: 0
LDH SERPL-CCNC: 185 UNIT/L (ref 140–271)
LYMPHOCYTES # BLD AUTO: 0.4 X10(3)/MCL (ref 0.6–4.6)
LYMPHOCYTES NFR BLD AUTO: 22 %
LYMPHOCYTES NFR BLD MANUAL: 17 % (ref 13–40)
MACROCYTES BLD QL SMEAR: 0
MCH RBC QN AUTO: 28.3 PG (ref 27–31)
MCHC RBC AUTO-ENTMCNC: 31.4 GM/DL (ref 33–36)
MCV RBC AUTO: 90.1 FL (ref 80–94)
MICROCYTES BLD QL SMEAR: 0
MONOCYTES # BLD AUTO: 0.5 X10(3)/MCL (ref 0.1–1.3)
MONOCYTES NFR BLD AUTO: 26.4 %
MONOCYTES NFR BLD MANUAL: 16 % (ref 2–11)
NEUTROPHILS # BLD AUTO: 0.7 X10(3)/MCL (ref 2.1–9.2)
NEUTROPHILS NFR BLD AUTO: 36.8 %
NEUTROPHILS NFR BLD MANUAL: 49 % (ref 47–80)
NRBC BLD MANUAL-RTO: 1 %
PLATELET # BLD AUTO: 278 X10(3)/MCL (ref 130–400)
PLATELET # BLD EST: NORMAL 10*3/UL
PMV BLD AUTO: 7.9 FL (ref 9.4–12.4)
POIKILOCYTOSIS BLD QL SMEAR: 0
POLYCHROMASIA BLD QL SMEAR: 0
POTASSIUM SERPL-SCNC: 3.7 MMOL/L (ref 3.5–5.1)
PROT SERPL-MCNC: 6.2 GM/DL (ref 5.8–7.6)
RBC # BLD AUTO: 4.03 X10(6)/MCL (ref 4.7–6.1)
RBC MORPH BLD: ABNORMAL
SCHISTOCYTES BLD QL AUTO: 0
SODIUM SERPL-SCNC: 143 MMOL/L (ref 136–145)
SPHEROCYTES BLD QL SMEAR: 0
TARGETS BLD QL SMEAR: 0
URATE SERPL-MCNC: 3.5 MG/DL (ref 3.5–7.2)
WBC # SPEC AUTO: 1.8 X10(3)/MCL (ref 4.5–11.5)

## 2021-10-20 ENCOUNTER — HISTORICAL (OUTPATIENT)
Dept: INFUSION THERAPY | Facility: HOSPITAL | Age: 66
End: 2021-10-20

## 2021-10-26 ENCOUNTER — HISTORICAL (OUTPATIENT)
Dept: HEMATOLOGY/ONCOLOGY | Facility: CLINIC | Age: 66
End: 2021-10-26

## 2021-10-26 LAB
ABS NEUT (OLG): 1.6 X10(3)/MCL (ref 2.1–9.2)
ALBUMIN SERPL-MCNC: 3.6 GM/DL (ref 3.4–4.8)
ALBUMIN/GLOB SERPL: 1.3 RATIO (ref 1.1–2)
ALP SERPL-CCNC: 88 UNIT/L (ref 40–150)
ALT SERPL-CCNC: 24 UNIT/L (ref 0–55)
AST SERPL-CCNC: 25 UNIT/L (ref 5–34)
BASOPHILS # BLD AUTO: 0 X10(3)/MCL (ref 0–0.2)
BASOPHILS NFR BLD AUTO: 1.4 %
BILIRUB SERPL-MCNC: 0.3 MG/DL
BILIRUBIN DIRECT+TOT PNL SERPL-MCNC: 0.1 MG/DL (ref 0–0.5)
BILIRUBIN DIRECT+TOT PNL SERPL-MCNC: 0.2 MG/DL (ref 0–0.8)
BUN SERPL-MCNC: 9.3 MG/DL (ref 8.4–25.7)
CALCIUM SERPL-MCNC: 9.8 MG/DL (ref 8.7–10.5)
CHLORIDE SERPL-SCNC: 106 MMOL/L (ref 98–107)
CO2 SERPL-SCNC: 26 MMOL/L (ref 23–31)
CREAT SERPL-MCNC: 0.69 MG/DL (ref 0.73–1.18)
EOSINOPHIL # BLD AUTO: 0.1 X10(3)/MCL (ref 0–0.9)
EOSINOPHIL NFR BLD AUTO: 5.1 %
ERYTHROCYTE [DISTWIDTH] IN BLOOD BY AUTOMATED COUNT: 17.8 % (ref 11.5–17)
GLOBULIN SER-MCNC: 2.8 GM/DL (ref 2.4–3.5)
GLUCOSE SERPL-MCNC: 133 MG/DL (ref 82–115)
HCT VFR BLD AUTO: 36 % (ref 42–52)
HGB BLD-MCNC: 11.5 GM/DL (ref 14–18)
LYMPHOCYTES # BLD AUTO: 0.2 X10(3)/MCL (ref 0.6–4.6)
LYMPHOCYTES NFR BLD AUTO: 11.6 %
MCH RBC QN AUTO: 28.7 PG (ref 27–31)
MCHC RBC AUTO-ENTMCNC: 31.9 GM/DL (ref 33–36)
MCV RBC AUTO: 89.8 FL (ref 80–94)
MONOCYTES # BLD AUTO: 0.1 X10(3)/MCL (ref 0.1–1.3)
MONOCYTES NFR BLD AUTO: 5.1 %
NEUTROPHILS # BLD AUTO: 1.6 X10(3)/MCL (ref 2.1–9.2)
NEUTROPHILS NFR BLD AUTO: 74.5 %
PLATELET # BLD AUTO: 204 X10(3)/MCL (ref 130–400)
PMV BLD AUTO: 7.9 FL (ref 9.4–12.4)
POTASSIUM SERPL-SCNC: 3.7 MMOL/L (ref 3.5–5.1)
PROT SERPL-MCNC: 6.4 GM/DL (ref 5.8–7.6)
RBC # BLD AUTO: 4.01 X10(6)/MCL (ref 4.7–6.1)
SODIUM SERPL-SCNC: 142 MMOL/L (ref 136–145)
WBC # SPEC AUTO: 2.2 X10(3)/MCL (ref 4.5–11.5)

## 2021-11-02 ENCOUNTER — HISTORICAL (OUTPATIENT)
Dept: ADMINISTRATIVE | Facility: HOSPITAL | Age: 66
End: 2021-11-02

## 2021-11-02 LAB
ABS NEUT (OLG): 0.72 X10(3)/MCL (ref 2.1–9.2)
ALBUMIN SERPL-MCNC: 3.4 GM/DL (ref 3.4–4.8)
ALBUMIN/GLOB SERPL: 1.3 RATIO (ref 1.1–2)
ALP SERPL-CCNC: 92 UNIT/L (ref 40–150)
ALT SERPL-CCNC: 18 UNIT/L (ref 0–55)
AST SERPL-CCNC: 23 UNIT/L (ref 5–34)
BASOPHILS # BLD AUTO: 0 X10(3)/MCL (ref 0–0.2)
BASOPHILS NFR BLD AUTO: 1.9 %
BILIRUB SERPL-MCNC: 0.3 MG/DL
BILIRUBIN DIRECT+TOT PNL SERPL-MCNC: 0.1 MG/DL (ref 0–0.5)
BILIRUBIN DIRECT+TOT PNL SERPL-MCNC: 0.2 MG/DL (ref 0–0.8)
BUN SERPL-MCNC: 11.9 MG/DL (ref 8.4–25.7)
CALCIUM SERPL-MCNC: 9.2 MG/DL (ref 8.7–10.5)
CHLORIDE SERPL-SCNC: 103 MMOL/L (ref 98–107)
CO2 SERPL-SCNC: 27 MMOL/L (ref 23–31)
CREAT SERPL-MCNC: 0.75 MG/DL (ref 0.73–1.18)
EOSINOPHIL # BLD AUTO: 0.3 X10(3)/MCL (ref 0–0.9)
EOSINOPHIL NFR BLD AUTO: 12.3 %
ERYTHROCYTE [DISTWIDTH] IN BLOOD BY AUTOMATED COUNT: 19.1 % (ref 11.5–17)
GLOBULIN SER-MCNC: 2.7 GM/DL (ref 2.4–3.5)
GLUCOSE SERPL-MCNC: 121 MG/DL (ref 82–115)
HCT VFR BLD AUTO: 33.6 % (ref 42–52)
HGB BLD-MCNC: 11 GM/DL (ref 14–18)
LDH SERPL-CCNC: 185 UNIT/L (ref 140–271)
LYMPHOCYTES # BLD AUTO: 0.4 X10(3)/MCL (ref 0.6–4.6)
LYMPHOCYTES NFR BLD AUTO: 17.5 %
MCH RBC QN AUTO: 29.1 PG (ref 27–31)
MCHC RBC AUTO-ENTMCNC: 32.7 GM/DL (ref 33–36)
MCV RBC AUTO: 88.9 FL (ref 80–94)
MONOCYTES # BLD AUTO: 0.7 X10(3)/MCL (ref 0.1–1.3)
MONOCYTES NFR BLD AUTO: 33.2 %
NEUTROPHILS # BLD AUTO: 0.7 X10(3)/MCL (ref 2.1–9.2)
NEUTROPHILS NFR BLD AUTO: 34.2 %
PLATELET # BLD AUTO: 288 X10(3)/MCL (ref 130–400)
PMV BLD AUTO: 7.9 FL (ref 9.4–12.4)
POTASSIUM SERPL-SCNC: 3.4 MMOL/L (ref 3.5–5.1)
PROT SERPL-MCNC: 6.1 GM/DL (ref 5.8–7.6)
RBC # BLD AUTO: 3.78 X10(6)/MCL (ref 4.7–6.1)
SODIUM SERPL-SCNC: 138 MMOL/L (ref 136–145)
URATE SERPL-MCNC: 4.2 MG/DL (ref 3.5–7.2)
WBC # SPEC AUTO: 2.1 X10(3)/MCL (ref 4.5–11.5)

## 2021-11-03 ENCOUNTER — HISTORICAL (OUTPATIENT)
Dept: INFUSION THERAPY | Facility: HOSPITAL | Age: 66
End: 2021-11-03

## 2021-11-08 ENCOUNTER — HISTORICAL (OUTPATIENT)
Dept: HEMATOLOGY/ONCOLOGY | Facility: CLINIC | Age: 66
End: 2021-11-08

## 2021-11-08 LAB
ABS NEUT (OLG): 1.77 X10(3)/MCL (ref 2.1–9.2)
ALBUMIN SERPL-MCNC: 3.4 GM/DL (ref 3.4–4.8)
ALBUMIN/GLOB SERPL: 1.1 RATIO (ref 1.1–2)
ALP SERPL-CCNC: 82 UNIT/L (ref 40–150)
ALT SERPL-CCNC: 23 UNIT/L (ref 0–55)
AST SERPL-CCNC: 27 UNIT/L (ref 5–34)
BASOPHILS # BLD AUTO: 0 X10(3)/MCL (ref 0–0.2)
BASOPHILS NFR BLD AUTO: 1.7 %
BILIRUB SERPL-MCNC: 0.4 MG/DL
BILIRUBIN DIRECT+TOT PNL SERPL-MCNC: 0.2 MG/DL (ref 0–0.5)
BILIRUBIN DIRECT+TOT PNL SERPL-MCNC: 0.2 MG/DL (ref 0–0.8)
BUN SERPL-MCNC: 8.2 MG/DL (ref 8.4–25.7)
CALCIUM SERPL-MCNC: 9.6 MG/DL (ref 8.7–10.5)
CHLORIDE SERPL-SCNC: 104 MMOL/L (ref 98–107)
CO2 SERPL-SCNC: 30 MMOL/L (ref 23–31)
CREAT SERPL-MCNC: 0.68 MG/DL (ref 0.73–1.18)
EOSINOPHIL # BLD AUTO: 0.1 X10(3)/MCL (ref 0–0.9)
EOSINOPHIL NFR BLD AUTO: 5 %
ERYTHROCYTE [DISTWIDTH] IN BLOOD BY AUTOMATED COUNT: 17.9 % (ref 11.5–17)
GLOBULIN SER-MCNC: 3 GM/DL (ref 2.4–3.5)
GLUCOSE SERPL-MCNC: 116 MG/DL (ref 82–115)
HCT VFR BLD AUTO: 34.5 % (ref 42–52)
HGB BLD-MCNC: 11.1 GM/DL (ref 14–18)
LYMPHOCYTES # BLD AUTO: 0.2 X10(3)/MCL (ref 0.6–4.6)
LYMPHOCYTES NFR BLD AUTO: 8.7 %
MCH RBC QN AUTO: 29 PG (ref 27–31)
MCHC RBC AUTO-ENTMCNC: 32.2 GM/DL (ref 33–36)
MCV RBC AUTO: 90.1 FL (ref 80–94)
MONOCYTES # BLD AUTO: 0.1 X10(3)/MCL (ref 0.1–1.3)
MONOCYTES NFR BLD AUTO: 5 %
NEUTROPHILS # BLD AUTO: 1.8 X10(3)/MCL (ref 2.1–9.2)
NEUTROPHILS NFR BLD AUTO: 73 %
PLATELET # BLD AUTO: 190 X10(3)/MCL (ref 130–400)
PMV BLD AUTO: 7.9 FL (ref 9.4–12.4)
POTASSIUM SERPL-SCNC: 3.3 MMOL/L (ref 3.5–5.1)
PROT SERPL-MCNC: 6.4 GM/DL (ref 5.8–7.6)
RBC # BLD AUTO: 3.83 X10(6)/MCL (ref 4.7–6.1)
SODIUM SERPL-SCNC: 143 MMOL/L (ref 136–145)
WBC # SPEC AUTO: 2.4 X10(3)/MCL (ref 4.5–11.5)

## 2021-11-16 ENCOUNTER — HISTORICAL (OUTPATIENT)
Dept: ADMINISTRATIVE | Facility: HOSPITAL | Age: 66
End: 2021-11-16

## 2021-11-16 LAB
ABS NEUT (OLG): 0.52 X10(3)/MCL (ref 2.1–9.2)
ALBUMIN SERPL-MCNC: 3.3 GM/DL (ref 3.4–4.8)
ALBUMIN/GLOB SERPL: 1.2 RATIO (ref 1.1–2)
ALP SERPL-CCNC: 82 UNIT/L (ref 40–150)
ALT SERPL-CCNC: 19 UNIT/L (ref 0–55)
AST SERPL-CCNC: 22 UNIT/L (ref 5–34)
BASOPHILS # BLD AUTO: 0 X10(3)/MCL (ref 0–0.2)
BASOPHILS NFR BLD AUTO: 1.2 %
BILIRUB SERPL-MCNC: 0.2 MG/DL
BILIRUBIN DIRECT+TOT PNL SERPL-MCNC: 0.1 MG/DL (ref 0–0.5)
BILIRUBIN DIRECT+TOT PNL SERPL-MCNC: 0.1 MG/DL (ref 0–0.8)
BUN SERPL-MCNC: 12.1 MG/DL (ref 8.4–25.7)
CALCIUM SERPL-MCNC: 9.4 MG/DL (ref 8.7–10.5)
CHLORIDE SERPL-SCNC: 103 MMOL/L (ref 98–107)
CO2 SERPL-SCNC: 30 MMOL/L (ref 23–31)
CREAT SERPL-MCNC: 0.73 MG/DL (ref 0.73–1.18)
EOSINOPHIL # BLD AUTO: 0.2 X10(3)/MCL (ref 0–0.9)
EOSINOPHIL NFR BLD AUTO: 12.7 %
ERYTHROCYTE [DISTWIDTH] IN BLOOD BY AUTOMATED COUNT: 18.3 % (ref 11.5–17)
GLOBULIN SER-MCNC: 2.7 GM/DL (ref 2.4–3.5)
GLUCOSE SERPL-MCNC: 135 MG/DL (ref 82–115)
HCT VFR BLD AUTO: 33.7 % (ref 42–52)
HGB BLD-MCNC: 10.7 GM/DL (ref 14–18)
LDH SERPL-CCNC: 187 UNIT/L (ref 140–271)
LYMPHOCYTES # BLD AUTO: 0.2 X10(3)/MCL (ref 0.6–4.6)
LYMPHOCYTES NFR BLD AUTO: 12.7 %
MCH RBC QN AUTO: 29.3 PG (ref 27–31)
MCHC RBC AUTO-ENTMCNC: 31.8 GM/DL (ref 33–36)
MCV RBC AUTO: 92.3 FL (ref 80–94)
MONOCYTES # BLD AUTO: 0.6 X10(3)/MCL (ref 0.1–1.3)
MONOCYTES NFR BLD AUTO: 35.5 %
NEUTROPHILS # BLD AUTO: 0.5 X10(3)/MCL (ref 2.1–9.2)
NEUTROPHILS NFR BLD AUTO: 31.3 %
PLATELET # BLD AUTO: 330 X10(3)/MCL (ref 130–400)
PMV BLD AUTO: 8 FL (ref 9.4–12.4)
POTASSIUM SERPL-SCNC: 3.6 MMOL/L (ref 3.5–5.1)
PROT SERPL-MCNC: 6 GM/DL (ref 5.8–7.6)
RBC # BLD AUTO: 3.65 X10(6)/MCL (ref 4.7–6.1)
SODIUM SERPL-SCNC: 139 MMOL/L (ref 136–145)
URATE SERPL-MCNC: 3.1 MG/DL (ref 3.5–7.2)
WBC # SPEC AUTO: 1.7 X10(3)/MCL (ref 4.5–11.5)

## 2021-11-17 ENCOUNTER — HISTORICAL (OUTPATIENT)
Dept: INFUSION THERAPY | Facility: HOSPITAL | Age: 66
End: 2021-11-17

## 2021-11-24 ENCOUNTER — HISTORICAL (OUTPATIENT)
Dept: HEMATOLOGY/ONCOLOGY | Facility: CLINIC | Age: 66
End: 2021-11-24

## 2021-11-24 LAB
ABS NEUT (OLG): 1.39 X10(3)/MCL (ref 2.1–9.2)
ALBUMIN SERPL-MCNC: 3.5 GM/DL (ref 3.4–4.8)
ALBUMIN/GLOB SERPL: 1.3 RATIO (ref 1.1–2)
ALP SERPL-CCNC: 85 UNIT/L (ref 40–150)
ALT SERPL-CCNC: 22 UNIT/L (ref 0–55)
AST SERPL-CCNC: 24 UNIT/L (ref 5–34)
BASOPHILS # BLD AUTO: 0 X10(3)/MCL (ref 0–0.2)
BASOPHILS NFR BLD AUTO: 1.5 %
BILIRUB SERPL-MCNC: 0.4 MG/DL
BILIRUBIN DIRECT+TOT PNL SERPL-MCNC: 0.1 MG/DL (ref 0–0.5)
BILIRUBIN DIRECT+TOT PNL SERPL-MCNC: 0.3 MG/DL (ref 0–0.8)
BUN SERPL-MCNC: 7 MG/DL (ref 8.4–25.7)
CALCIUM SERPL-MCNC: 9.9 MG/DL (ref 8.7–10.5)
CHLORIDE SERPL-SCNC: 104 MMOL/L (ref 98–107)
CO2 SERPL-SCNC: 28 MMOL/L (ref 23–31)
CREAT SERPL-MCNC: 0.69 MG/DL (ref 0.73–1.18)
EOSINOPHIL # BLD AUTO: 0.1 X10(3)/MCL (ref 0–0.9)
EOSINOPHIL NFR BLD AUTO: 4 %
ERYTHROCYTE [DISTWIDTH] IN BLOOD BY AUTOMATED COUNT: 16.5 % (ref 11.5–17)
GLOBULIN SER-MCNC: 2.7 GM/DL (ref 2.4–3.5)
GLUCOSE SERPL-MCNC: 113 MG/DL (ref 82–115)
HCT VFR BLD AUTO: 33.8 % (ref 42–52)
HGB BLD-MCNC: 10.9 GM/DL (ref 14–18)
LYMPHOCYTES # BLD AUTO: 0.2 X10(3)/MCL (ref 0.6–4.6)
LYMPHOCYTES NFR BLD AUTO: 12 %
MCH RBC QN AUTO: 29.5 PG (ref 27–31)
MCHC RBC AUTO-ENTMCNC: 32.2 GM/DL (ref 33–36)
MCV RBC AUTO: 91.6 FL (ref 80–94)
MONOCYTES # BLD AUTO: 0.1 X10(3)/MCL (ref 0.1–1.3)
MONOCYTES NFR BLD AUTO: 4 %
NEUTROPHILS # BLD AUTO: 1.4 X10(3)/MCL (ref 2.1–9.2)
NEUTROPHILS NFR BLD AUTO: 69.5 %
PLATELET # BLD AUTO: 234 X10(3)/MCL (ref 130–400)
PMV BLD AUTO: 8.4 FL (ref 9.4–12.4)
POTASSIUM SERPL-SCNC: 3.4 MMOL/L (ref 3.5–5.1)
PROT SERPL-MCNC: 6.2 GM/DL (ref 5.8–7.6)
RBC # BLD AUTO: 3.69 X10(6)/MCL (ref 4.7–6.1)
SODIUM SERPL-SCNC: 143 MMOL/L (ref 136–145)
WBC # SPEC AUTO: 2 X10(3)/MCL (ref 4.5–11.5)

## 2021-11-30 ENCOUNTER — HISTORICAL (OUTPATIENT)
Dept: ADMINISTRATIVE | Facility: HOSPITAL | Age: 66
End: 2021-11-30

## 2021-11-30 LAB
ABS NEUT (OLG): 0.58 X10(3)/MCL (ref 2.1–9.2)
ALBUMIN SERPL-MCNC: 3.2 GM/DL (ref 3.4–4.8)
ALBUMIN/GLOB SERPL: 1.2 RATIO (ref 1.1–2)
ALP SERPL-CCNC: 88 UNIT/L (ref 40–150)
ALT SERPL-CCNC: 18 UNIT/L (ref 0–55)
AST SERPL-CCNC: 22 UNIT/L (ref 5–34)
BASOPHILS # BLD AUTO: 0 X10(3)/MCL (ref 0–0.2)
BASOPHILS NFR BLD AUTO: 1.4 %
BILIRUB SERPL-MCNC: 0.3 MG/DL
BILIRUBIN DIRECT+TOT PNL SERPL-MCNC: 0.1 MG/DL (ref 0–0.8)
BILIRUBIN DIRECT+TOT PNL SERPL-MCNC: 0.2 MG/DL (ref 0–0.5)
BUN SERPL-MCNC: 13.2 MG/DL (ref 8.4–25.7)
CALCIUM SERPL-MCNC: 8.9 MG/DL (ref 8.7–10.5)
CHLORIDE SERPL-SCNC: 99 MMOL/L (ref 98–107)
CO2 SERPL-SCNC: 28 MMOL/L (ref 23–31)
CREAT SERPL-MCNC: 0.68 MG/DL (ref 0.73–1.18)
EOSINOPHIL # BLD AUTO: 0.2 X10(3)/MCL (ref 0–0.9)
EOSINOPHIL NFR BLD AUTO: 10.6 %
ERYTHROCYTE [DISTWIDTH] IN BLOOD BY AUTOMATED COUNT: 16.5 % (ref 11.5–17)
GLOBULIN SER-MCNC: 2.7 GM/DL (ref 2.4–3.5)
GLUCOSE SERPL-MCNC: 92 MG/DL (ref 82–115)
HCT VFR BLD AUTO: 31.6 % (ref 42–52)
HGB BLD-MCNC: 10.1 GM/DL (ref 14–18)
LDH SERPL-CCNC: 205 UNIT/L (ref 140–271)
LYMPHOCYTES # BLD AUTO: 0.4 X10(3)/MCL (ref 0.6–4.6)
LYMPHOCYTES NFR BLD AUTO: 16.5 %
MCH RBC QN AUTO: 29.4 PG (ref 27–31)
MCHC RBC AUTO-ENTMCNC: 32 GM/DL (ref 33–36)
MCV RBC AUTO: 91.9 FL (ref 80–94)
MONOCYTES # BLD AUTO: 0.9 X10(3)/MCL (ref 0.1–1.3)
MONOCYTES NFR BLD AUTO: 39.4 %
NEUTROPHILS # BLD AUTO: 0.6 X10(3)/MCL (ref 2.1–9.2)
NEUTROPHILS NFR BLD AUTO: 26.6 %
PLATELET # BLD AUTO: 355 X10(3)/MCL (ref 130–400)
PMV BLD AUTO: 8.5 FL (ref 9.4–12.4)
POTASSIUM SERPL-SCNC: 3.3 MMOL/L (ref 3.5–5.1)
PROT SERPL-MCNC: 5.9 GM/DL (ref 5.8–7.6)
RBC # BLD AUTO: 3.44 X10(6)/MCL (ref 4.7–6.1)
SODIUM SERPL-SCNC: 138 MMOL/L (ref 136–145)
URATE SERPL-MCNC: 4 MG/DL (ref 3.5–7.2)
WBC # SPEC AUTO: 2.2 X10(3)/MCL (ref 4.5–11.5)

## 2021-12-01 ENCOUNTER — HISTORICAL (OUTPATIENT)
Dept: INFUSION THERAPY | Facility: HOSPITAL | Age: 66
End: 2021-12-01

## 2021-12-15 ENCOUNTER — HISTORICAL (OUTPATIENT)
Dept: INFUSION THERAPY | Facility: HOSPITAL | Age: 66
End: 2021-12-15

## 2021-12-15 LAB
ABS NEUT (OLG): 0.52 X10(3)/MCL (ref 2.1–9.2)
ALBUMIN SERPL-MCNC: 3.2 GM/DL (ref 3.4–4.8)
ALBUMIN/GLOB SERPL: 1.1 RATIO (ref 1.1–2)
ALP SERPL-CCNC: 87 UNIT/L (ref 40–150)
ALT SERPL-CCNC: 18 UNIT/L (ref 0–55)
AST SERPL-CCNC: 23 UNIT/L (ref 5–34)
BASOPHILS # BLD AUTO: 0 X10(3)/MCL (ref 0–0.2)
BASOPHILS NFR BLD AUTO: 1.1 %
BILIRUB SERPL-MCNC: 0.3 MG/DL
BILIRUBIN DIRECT+TOT PNL SERPL-MCNC: 0.1 MG/DL (ref 0–0.5)
BILIRUBIN DIRECT+TOT PNL SERPL-MCNC: 0.2 MG/DL (ref 0–0.8)
BUN SERPL-MCNC: 6.8 MG/DL (ref 8.4–25.7)
CALCIUM SERPL-MCNC: 9.5 MG/DL (ref 8.7–10.5)
CHLORIDE SERPL-SCNC: 101 MMOL/L (ref 98–107)
CO2 SERPL-SCNC: 31 MMOL/L (ref 23–31)
CREAT SERPL-MCNC: 0.73 MG/DL (ref 0.73–1.18)
EOSINOPHIL # BLD AUTO: 0.1 X10(3)/MCL (ref 0–0.9)
EOSINOPHIL NFR BLD AUTO: 6.2 %
ERYTHROCYTE [DISTWIDTH] IN BLOOD BY AUTOMATED COUNT: 16.4 % (ref 11.5–17)
GLOBULIN SER-MCNC: 2.9 GM/DL (ref 2.4–3.5)
GLUCOSE SERPL-MCNC: 108 MG/DL (ref 82–115)
HCT VFR BLD AUTO: 33.7 % (ref 42–52)
HGB BLD-MCNC: 10.7 GM/DL (ref 14–18)
LYMPHOCYTES # BLD AUTO: 0.4 X10(3)/MCL (ref 0.6–4.6)
LYMPHOCYTES NFR BLD AUTO: 21.3 %
MCH RBC QN AUTO: 29.6 PG (ref 27–31)
MCHC RBC AUTO-ENTMCNC: 31.8 GM/DL (ref 33–36)
MCV RBC AUTO: 93.1 FL (ref 80–94)
MONOCYTES # BLD AUTO: 0.7 X10(3)/MCL (ref 0.1–1.3)
MONOCYTES NFR BLD AUTO: 38.2 %
NEUTROPHILS # BLD AUTO: 0.5 X10(3)/MCL (ref 2.1–9.2)
NEUTROPHILS NFR BLD AUTO: 29.3 %
PLATELET # BLD AUTO: 371 X10(3)/MCL (ref 130–400)
PMV BLD AUTO: 8.5 FL (ref 9.4–12.4)
POTASSIUM SERPL-SCNC: 3.2 MMOL/L (ref 3.5–5.1)
PROT SERPL-MCNC: 6.1 GM/DL (ref 5.8–7.6)
RBC # BLD AUTO: 3.62 X10(6)/MCL (ref 4.7–6.1)
SODIUM SERPL-SCNC: 140 MMOL/L (ref 136–145)
WBC # SPEC AUTO: 1.8 X10(3)/MCL (ref 4.5–11.5)

## 2021-12-22 ENCOUNTER — HISTORICAL (OUTPATIENT)
Dept: HEMATOLOGY/ONCOLOGY | Facility: CLINIC | Age: 66
End: 2021-12-22

## 2021-12-22 LAB
ABS NEUT (OLG): 0.63 X10(3)/MCL (ref 2.1–9.2)
ALBUMIN SERPL-MCNC: 3.3 GM/DL (ref 3.4–4.8)
ALBUMIN/GLOB SERPL: 1.1 RATIO (ref 1.1–2)
ALP SERPL-CCNC: 80 UNIT/L (ref 40–150)
ALT SERPL-CCNC: 18 UNIT/L (ref 0–55)
AST SERPL-CCNC: 21 UNIT/L (ref 5–34)
BASOPHILS # BLD AUTO: 0 X10(3)/MCL (ref 0–0.2)
BASOPHILS NFR BLD AUTO: 2.7 %
BILIRUB SERPL-MCNC: 0.2 MG/DL
BILIRUBIN DIRECT+TOT PNL SERPL-MCNC: 0.1 MG/DL (ref 0–0.5)
BILIRUBIN DIRECT+TOT PNL SERPL-MCNC: 0.1 MG/DL (ref 0–0.8)
BUN SERPL-MCNC: 7 MG/DL (ref 8.4–25.7)
CALCIUM SERPL-MCNC: 9.4 MG/DL (ref 8.7–10.5)
CHLORIDE SERPL-SCNC: 103 MMOL/L (ref 98–107)
CO2 SERPL-SCNC: 32 MMOL/L (ref 23–31)
CREAT SERPL-MCNC: 0.66 MG/DL (ref 0.73–1.18)
EOSINOPHIL # BLD AUTO: 0 X10(3)/MCL (ref 0–0.9)
EOSINOPHIL NFR BLD AUTO: 3.6 %
ERYTHROCYTE [DISTWIDTH] IN BLOOD BY AUTOMATED COUNT: 15.6 % (ref 11.5–17)
GLOBULIN SER-MCNC: 2.9 GM/DL (ref 2.4–3.5)
GLUCOSE SERPL-MCNC: 131 MG/DL (ref 82–115)
HCT VFR BLD AUTO: 33.2 % (ref 42–52)
HGB BLD-MCNC: 10.5 GM/DL (ref 14–18)
LYMPHOCYTES # BLD AUTO: 0.2 X10(3)/MCL (ref 0.6–4.6)
LYMPHOCYTES NFR BLD AUTO: 22.3 %
MCH RBC QN AUTO: 29.2 PG (ref 27–31)
MCHC RBC AUTO-ENTMCNC: 31.6 GM/DL (ref 33–36)
MCV RBC AUTO: 92.2 FL (ref 80–94)
MONOCYTES # BLD AUTO: 0.1 X10(3)/MCL (ref 0.1–1.3)
MONOCYTES NFR BLD AUTO: 8 %
NEUTROPHILS # BLD AUTO: 0.6 X10(3)/MCL (ref 2.1–9.2)
NEUTROPHILS NFR BLD AUTO: 56.3 %
PLATELET # BLD AUTO: 268 X10(3)/MCL (ref 130–400)
PMV BLD AUTO: 8.7 FL (ref 9.4–12.4)
POTASSIUM SERPL-SCNC: 3.3 MMOL/L (ref 3.5–5.1)
PROT SERPL-MCNC: 6.2 GM/DL (ref 5.8–7.6)
RBC # BLD AUTO: 3.6 X10(6)/MCL (ref 4.7–6.1)
SODIUM SERPL-SCNC: 142 MMOL/L (ref 136–145)
WBC # SPEC AUTO: 1.1 X10(3)/MCL (ref 4.5–11.5)

## 2021-12-29 ENCOUNTER — HISTORICAL (OUTPATIENT)
Dept: ADMINISTRATIVE | Facility: HOSPITAL | Age: 66
End: 2021-12-29

## 2021-12-29 LAB
ABS NEUT (OLG): 1.36 X10(3)/MCL (ref 2.1–9.2)
ALBUMIN SERPL-MCNC: 3.2 GM/DL (ref 3.4–4.8)
ALBUMIN/GLOB SERPL: 1 RATIO (ref 1.1–2)
ALP SERPL-CCNC: 92 UNIT/L (ref 40–150)
ALT SERPL-CCNC: 20 UNIT/L (ref 0–55)
ANISOCYTOSIS BLD QL SMEAR: 1
AST SERPL-CCNC: 32 UNIT/L (ref 5–34)
BASOPHILS # BLD AUTO: 0 X10(3)/MCL (ref 0–0.2)
BASOPHILS NFR BLD AUTO: 1.3 %
BASOPHILS NFR BLD MANUAL: 5 % (ref 0–2)
BILIRUB SERPL-MCNC: 0.3 MG/DL
BILIRUBIN DIRECT+TOT PNL SERPL-MCNC: 0.1 MG/DL (ref 0–0.8)
BILIRUBIN DIRECT+TOT PNL SERPL-MCNC: 0.2 MG/DL (ref 0–0.5)
BUN SERPL-MCNC: 7.9 MG/DL (ref 8.4–25.7)
CALCIUM SERPL-MCNC: 9.6 MG/DL (ref 8.7–10.5)
CHLORIDE SERPL-SCNC: 101 MMOL/L (ref 98–107)
CO2 SERPL-SCNC: 28 MMOL/L (ref 23–31)
CREAT SERPL-MCNC: 0.69 MG/DL (ref 0.73–1.18)
EOSINOPHIL # BLD AUTO: 0.1 X10(3)/MCL (ref 0–0.9)
EOSINOPHIL NFR BLD AUTO: 4.4 %
EOSINOPHIL NFR BLD MANUAL: 3 % (ref 0–8)
ERYTHROCYTE [DISTWIDTH] IN BLOOD BY AUTOMATED COUNT: 16.2 % (ref 11.5–17)
ERYTHROCYTE [SEDIMENTATION RATE] IN BLOOD: 97 MM/HR (ref 0–15)
GLOBULIN SER-MCNC: 3.3 GM/DL (ref 2.4–3.5)
GLUCOSE SERPL-MCNC: 130 MG/DL (ref 82–115)
HCT VFR BLD AUTO: 33.9 % (ref 42–52)
HGB BLD-MCNC: 10.7 GM/DL (ref 14–18)
HYPOCHROMIA BLD QL SMEAR: 0
LDH SERPL-CCNC: 257 UNIT/L (ref 140–271)
LYMPHOCYTES # BLD AUTO: 0.3 X10(3)/MCL (ref 0.6–4.6)
LYMPHOCYTES NFR BLD AUTO: 10.4 %
LYMPHOCYTES NFR BLD MANUAL: 21 % (ref 13–40)
MACROCYTES BLD QL SMEAR: 0
MCH RBC QN AUTO: 28.5 PG (ref 27–31)
MCHC RBC AUTO-ENTMCNC: 31.6 GM/DL (ref 33–36)
MCV RBC AUTO: 90.4 FL (ref 80–94)
MICROCYTES BLD QL SMEAR: 0
MONOCYTES # BLD AUTO: 0.9 X10(3)/MCL (ref 0.1–1.3)
MONOCYTES NFR BLD AUTO: 29.2 %
MONOCYTES NFR BLD MANUAL: 26 % (ref 2–11)
MYELOCYTES NFR BLD MANUAL: 1 %
NEUTROPHILS # BLD AUTO: 1.4 X10(3)/MCL (ref 2.1–9.2)
NEUTROPHILS NFR BLD AUTO: 45.6 %
NEUTROPHILS NFR BLD MANUAL: 42 % (ref 47–80)
PLATELET # BLD AUTO: 465 X10(3)/MCL (ref 130–400)
PLATELET # BLD EST: ABNORMAL 10*3/UL
PMV BLD AUTO: 8.5 FL (ref 9.4–12.4)
POIKILOCYTOSIS BLD QL SMEAR: 0
POLYCHROMASIA BLD QL SMEAR: 1
POTASSIUM SERPL-SCNC: 3 MMOL/L (ref 3.5–5.1)
PROT SERPL-MCNC: 6.5 GM/DL (ref 5.8–7.6)
RBC # BLD AUTO: 3.75 X10(6)/MCL (ref 4.7–6.1)
SCHISTOCYTES BLD QL AUTO: 0
SODIUM SERPL-SCNC: 140 MMOL/L (ref 136–145)
SPHEROCYTES BLD QL SMEAR: 0
TARGETS BLD QL SMEAR: 0
URATE SERPL-MCNC: 4 MG/DL (ref 3.5–7.2)
WBC # SPEC AUTO: 3 X10(3)/MCL (ref 4.5–11.5)

## 2021-12-30 ENCOUNTER — HISTORICAL (OUTPATIENT)
Dept: INFUSION THERAPY | Facility: HOSPITAL | Age: 66
End: 2021-12-30

## 2022-01-01 ENCOUNTER — LAB VISIT (OUTPATIENT)
Dept: LAB | Facility: HOSPITAL | Age: 67
End: 2022-01-01
Attending: INTERNAL MEDICINE
Payer: COMMERCIAL

## 2022-01-01 ENCOUNTER — OFFICE VISIT (OUTPATIENT)
Dept: HEMATOLOGY/ONCOLOGY | Facility: CLINIC | Age: 67
End: 2022-01-01
Payer: COMMERCIAL

## 2022-01-01 ENCOUNTER — INFUSION (OUTPATIENT)
Dept: INFUSION THERAPY | Facility: HOSPITAL | Age: 67
End: 2022-01-01
Attending: INTERNAL MEDICINE
Payer: COMMERCIAL

## 2022-01-01 ENCOUNTER — DOCUMENTATION ONLY (OUTPATIENT)
Dept: ADMINISTRATIVE | Facility: HOSPITAL | Age: 67
End: 2022-01-01
Payer: COMMERCIAL

## 2022-01-01 ENCOUNTER — HOSPITAL ENCOUNTER (OUTPATIENT)
Dept: RADIOLOGY | Facility: HOSPITAL | Age: 67
Discharge: HOME | End: 2022-12-02
Attending: NURSE PRACTITIONER
Payer: COMMERCIAL

## 2022-01-01 ENCOUNTER — INFUSION (OUTPATIENT)
Dept: INFUSION THERAPY | Facility: HOSPITAL | Age: 67
End: 2022-01-01
Attending: FAMILY MEDICINE
Payer: COMMERCIAL

## 2022-01-01 VITALS
HEIGHT: 72 IN | RESPIRATION RATE: 18 BRPM | BODY MASS INDEX: 27.08 KG/M2 | WEIGHT: 199.94 LBS | DIASTOLIC BLOOD PRESSURE: 69 MMHG | TEMPERATURE: 98 F | HEART RATE: 75 BPM | SYSTOLIC BLOOD PRESSURE: 111 MMHG | OXYGEN SATURATION: 97 %

## 2022-01-01 VITALS
DIASTOLIC BLOOD PRESSURE: 72 MMHG | SYSTOLIC BLOOD PRESSURE: 109 MMHG | OXYGEN SATURATION: 97 % | HEART RATE: 80 BPM | RESPIRATION RATE: 18 BRPM | TEMPERATURE: 97 F

## 2022-01-01 VITALS
SYSTOLIC BLOOD PRESSURE: 135 MMHG | OXYGEN SATURATION: 98 % | RESPIRATION RATE: 18 BRPM | TEMPERATURE: 98 F | DIASTOLIC BLOOD PRESSURE: 78 MMHG | OXYGEN SATURATION: 98 % | HEIGHT: 72 IN | DIASTOLIC BLOOD PRESSURE: 79 MMHG | BODY MASS INDEX: 27.35 KG/M2 | RESPIRATION RATE: 18 BRPM | WEIGHT: 201.94 LBS | HEART RATE: 79 BPM | SYSTOLIC BLOOD PRESSURE: 123 MMHG | HEART RATE: 90 BPM | WEIGHT: 201.19 LBS | BODY MASS INDEX: 27.25 KG/M2 | TEMPERATURE: 98 F | HEIGHT: 72 IN

## 2022-01-01 VITALS
DIASTOLIC BLOOD PRESSURE: 67 MMHG | HEIGHT: 72 IN | OXYGEN SATURATION: 98 % | SYSTOLIC BLOOD PRESSURE: 104 MMHG | HEART RATE: 81 BPM | TEMPERATURE: 99 F | RESPIRATION RATE: 18 BRPM | BODY MASS INDEX: 27.14 KG/M2 | WEIGHT: 200.38 LBS

## 2022-01-01 VITALS
WEIGHT: 200.38 LBS | HEIGHT: 72 IN | RESPIRATION RATE: 20 BRPM | HEART RATE: 94 BPM | BODY MASS INDEX: 27.14 KG/M2 | DIASTOLIC BLOOD PRESSURE: 77 MMHG | SYSTOLIC BLOOD PRESSURE: 123 MMHG

## 2022-01-01 DIAGNOSIS — C61 MALIGNANT NEOPLASM OF PROSTATE: ICD-10-CM

## 2022-01-01 DIAGNOSIS — C61 PROSTATE CANCER METASTATIC TO BONE: Primary | ICD-10-CM

## 2022-01-01 DIAGNOSIS — C83.00: ICD-10-CM

## 2022-01-01 DIAGNOSIS — C61 PROSTATE CANCER METASTATIC TO BONE: ICD-10-CM

## 2022-01-01 DIAGNOSIS — C81.10 NODULAR SCLEROSING HODGKIN'S LYMPHOMA, UNSPECIFIED BODY REGION: ICD-10-CM

## 2022-01-01 DIAGNOSIS — C79.51 PROSTATE CANCER METASTATIC TO BONE: Primary | ICD-10-CM

## 2022-01-01 DIAGNOSIS — C79.51 PROSTATE CANCER METASTATIC TO BONE: ICD-10-CM

## 2022-01-01 DIAGNOSIS — C83.00: Primary | ICD-10-CM

## 2022-01-01 DIAGNOSIS — C81.10 NODULAR SCLEROSING HODGKIN'S LYMPHOMA, UNSPECIFIED BODY REGION: Primary | ICD-10-CM

## 2022-01-01 LAB
ALBUMIN SERPL-MCNC: 3.5 G/DL (ref 3.4–4.8)
ALBUMIN SERPL-MCNC: 3.7 GM/DL (ref 3.4–4.8)
ALBUMIN SERPL-MCNC: 3.7 GM/DL (ref 3.4–4.8)
ALBUMIN SERPL-MCNC: 4 GM/DL (ref 3.4–4.8)
ALBUMIN/GLOB SERPL: 1.1 RATIO (ref 1.1–2)
ALBUMIN/GLOB SERPL: 1.3 RATIO (ref 1.1–2)
ALBUMIN/GLOB SERPL: 1.4 RATIO (ref 1.1–2)
ALBUMIN/GLOB SERPL: 1.5 RATIO (ref 1.1–2)
ALP SERPL-CCNC: 123 UNIT/L (ref 40–150)
ALP SERPL-CCNC: 129 UNIT/L (ref 40–150)
ALP SERPL-CCNC: 86 UNIT/L (ref 40–150)
ALP SERPL-CCNC: 90 UNIT/L (ref 40–150)
ALT SERPL-CCNC: 20 UNIT/L (ref 0–55)
ALT SERPL-CCNC: 22 UNIT/L (ref 0–55)
ALT SERPL-CCNC: 22 UNIT/L (ref 0–55)
ALT SERPL-CCNC: 27 UNIT/L (ref 0–55)
AST SERPL-CCNC: 20 UNIT/L (ref 5–34)
AST SERPL-CCNC: 24 UNIT/L (ref 5–34)
AST SERPL-CCNC: 25 UNIT/L (ref 5–34)
AST SERPL-CCNC: 42 UNIT/L (ref 5–34)
BASOPHILS # BLD AUTO: 0.02 X10(3)/MCL (ref 0–0.2)
BASOPHILS # BLD AUTO: 0.02 X10(3)/MCL (ref 0–0.2)
BASOPHILS # BLD AUTO: 0.03 X10(3)/MCL (ref 0–0.2)
BASOPHILS # BLD AUTO: 0.03 X10(3)/MCL (ref 0–0.2)
BASOPHILS NFR BLD AUTO: 0.4 %
BASOPHILS NFR BLD AUTO: 0.6 %
BILIRUBIN DIRECT+TOT PNL SERPL-MCNC: 0.3 MG/DL
BILIRUBIN DIRECT+TOT PNL SERPL-MCNC: 0.3 MG/DL
BILIRUBIN DIRECT+TOT PNL SERPL-MCNC: 0.4 MG/DL
BILIRUBIN DIRECT+TOT PNL SERPL-MCNC: 0.4 MG/DL
BUN SERPL-MCNC: 6.7 MG/DL (ref 8.4–25.7)
BUN SERPL-MCNC: 8.2 MG/DL (ref 8.4–25.7)
BUN SERPL-MCNC: 8.2 MG/DL (ref 8.4–25.7)
BUN SERPL-MCNC: 9.2 MG/DL (ref 8.4–25.7)
CALCIUM SERPL-MCNC: 10.5 MG/DL (ref 8.8–10)
CALCIUM SERPL-MCNC: 8.9 MG/DL (ref 8.8–10)
CALCIUM SERPL-MCNC: 9.7 MG/DL (ref 8.8–10)
CALCIUM SERPL-MCNC: 9.8 MG/DL (ref 8.8–10)
CHLORIDE SERPL-SCNC: 103 MMOL/L (ref 98–107)
CHLORIDE SERPL-SCNC: 106 MMOL/L (ref 98–107)
CHLORIDE SERPL-SCNC: 106 MMOL/L (ref 98–107)
CHLORIDE SERPL-SCNC: 107 MMOL/L (ref 98–107)
CO2 SERPL-SCNC: 25 MMOL/L (ref 23–31)
CO2 SERPL-SCNC: 26 MMOL/L (ref 23–31)
CREAT SERPL-MCNC: 0.68 MG/DL (ref 0.73–1.18)
CREAT SERPL-MCNC: 0.69 MG/DL (ref 0.73–1.18)
CREAT SERPL-MCNC: 0.72 MG/DL (ref 0.73–1.18)
CREAT SERPL-MCNC: 0.76 MG/DL (ref 0.73–1.18)
EOSINOPHIL # BLD AUTO: 0.03 X10(3)/MCL (ref 0–0.9)
EOSINOPHIL # BLD AUTO: 0.04 X10(3)/MCL (ref 0–0.9)
EOSINOPHIL # BLD AUTO: 0.18 X10(3)/MCL (ref 0–0.9)
EOSINOPHIL # BLD AUTO: 0.21 X10(3)/MCL (ref 0–0.9)
EOSINOPHIL NFR BLD AUTO: 0.4 %
EOSINOPHIL NFR BLD AUTO: 0.7 %
EOSINOPHIL NFR BLD AUTO: 4 %
EOSINOPHIL NFR BLD AUTO: 4.4 %
ERYTHROCYTE [DISTWIDTH] IN BLOOD BY AUTOMATED COUNT: 12.1 % (ref 11.5–17)
ERYTHROCYTE [DISTWIDTH] IN BLOOD BY AUTOMATED COUNT: 12.5 % (ref 11.5–17)
ERYTHROCYTE [DISTWIDTH] IN BLOOD BY AUTOMATED COUNT: 13.5 % (ref 11.5–17)
ERYTHROCYTE [DISTWIDTH] IN BLOOD BY AUTOMATED COUNT: 14.4 % (ref 11.6–14.4)
ERYTHROCYTE [SEDIMENTATION RATE] IN BLOOD: 10 MM/HR (ref 0–15)
ERYTHROCYTE [SEDIMENTATION RATE] IN BLOOD: 37 MM/HR (ref 0–15)
FREE/TOTAL PSA (OHS): 14.3 %
FREE/TOTAL PSA (OHS): 14.7 %
FREE/TOTAL PSA (OHS): 15.4 %
FREE/TOTAL PSA (OHS): 17 %
GFR SERPLBLD CREATININE-BSD FMLA CKD-EPI: >60 MLS/MIN/1.73/M2
GLOBULIN SER-MCNC: 2.5 GM/DL (ref 2.4–3.5)
GLOBULIN SER-MCNC: 2.7 GM/DL (ref 2.4–3.5)
GLOBULIN SER-MCNC: 2.8 GM/DL (ref 2.4–3.5)
GLOBULIN SER-MCNC: 3.3 GM/DL (ref 2.4–3.5)
GLUCOSE SERPL-MCNC: 89 MG/DL (ref 82–115)
GLUCOSE SERPL-MCNC: 98 MG/DL (ref 82–115)
GLUCOSE SERPL-MCNC: 99 MG/DL (ref 82–115)
GLUCOSE SERPL-MCNC: 99 MG/DL (ref 82–115)
HCT VFR BLD AUTO: 34.2 % (ref 42–52)
HCT VFR BLD AUTO: 37.4 % (ref 42–52)
HCT VFR BLD AUTO: 38.5 % (ref 42–52)
HCT VFR BLD AUTO: 40.6 % (ref 42–52)
HGB BLD-MCNC: 10.8 GM/DL (ref 14–18)
HGB BLD-MCNC: 11.9 GM/DL (ref 14–18)
HGB BLD-MCNC: 12.6 GM/DL (ref 14–18)
HGB BLD-MCNC: 13.1 GM/DL (ref 14–18)
IMM GRANULOCYTES # BLD AUTO: 0.01 X10(3)/MCL (ref 0–0.04)
IMM GRANULOCYTES # BLD AUTO: 0.03 X10(3)/MCL (ref 0–0.04)
IMM GRANULOCYTES # BLD AUTO: 0.08 X10(3)/MCL (ref 0–0.04)
IMM GRANULOCYTES # BLD AUTO: 0.09 X10(3)/MCL (ref 0–0.04)
IMM GRANULOCYTES NFR BLD AUTO: 0.2 %
IMM GRANULOCYTES NFR BLD AUTO: 0.6 %
IMM GRANULOCYTES NFR BLD AUTO: 1.3 %
IMM GRANULOCYTES NFR BLD AUTO: 1.4 %
LDH SERPL-CCNC: 173 U/L (ref 125–220)
LDH SERPL-CCNC: 225 U/L (ref 125–220)
LYMPHOCYTES # BLD AUTO: 0.23 X10(3)/MCL (ref 0.6–4.6)
LYMPHOCYTES # BLD AUTO: 0.39 X10(3)/MCL (ref 0.6–4.6)
LYMPHOCYTES # BLD AUTO: 0.39 X10(3)/MCL (ref 0.6–4.6)
LYMPHOCYTES # BLD AUTO: 0.84 X10(3)/MCL (ref 0.6–4.6)
LYMPHOCYTES NFR BLD AUTO: 18.9 %
LYMPHOCYTES NFR BLD AUTO: 4.9 %
LYMPHOCYTES NFR BLD AUTO: 5.5 %
LYMPHOCYTES NFR BLD AUTO: 7 %
MCH RBC QN AUTO: 30.7 PG (ref 27–31)
MCH RBC QN AUTO: 30.7 PG (ref 27–31)
MCH RBC QN AUTO: 30.8 PG
MCH RBC QN AUTO: 31.2 PG (ref 27–31)
MCHC RBC AUTO-ENTMCNC: 31.6 MG/DL (ref 33–36)
MCHC RBC AUTO-ENTMCNC: 31.8 MG/DL (ref 33–36)
MCHC RBC AUTO-ENTMCNC: 32.3 MG/DL (ref 33–36)
MCHC RBC AUTO-ENTMCNC: 32.7 MG/DL (ref 33–36)
MCV RBC AUTO: 95.1 FL (ref 80–94)
MCV RBC AUTO: 95.3 FL (ref 80–94)
MCV RBC AUTO: 96.4 FL (ref 80–94)
MCV RBC AUTO: 97.4 FL (ref 80–94)
MONOCYTES # BLD AUTO: 0.35 X10(3)/MCL (ref 0.1–1.3)
MONOCYTES # BLD AUTO: 0.38 X10(3)/MCL (ref 0.1–1.3)
MONOCYTES # BLD AUTO: 0.56 X10(3)/MCL (ref 0.1–1.3)
MONOCYTES # BLD AUTO: 0.59 X10(3)/MCL (ref 0.1–1.3)
MONOCYTES NFR BLD AUTO: 11.8 %
MONOCYTES NFR BLD AUTO: 6.8 %
MONOCYTES NFR BLD AUTO: 7.9 %
MONOCYTES NFR BLD AUTO: 8.3 %
NEUTROPHILS # BLD AUTO: 3.1 X10(3)/MCL (ref 2.1–9.2)
NEUTROPHILS # BLD AUTO: 3.7 X10(3)/MCL (ref 2.1–9.2)
NEUTROPHILS # BLD AUTO: 4.7 X10(3)/MCL (ref 2.1–9.2)
NEUTROPHILS # BLD AUTO: 6 X10(3)/MCL (ref 2.1–9.2)
NEUTROPHILS NFR BLD AUTO: 68.6 %
NEUTROPHILS NFR BLD AUTO: 77.7 %
NEUTROPHILS NFR BLD AUTO: 83.7 %
NEUTROPHILS NFR BLD AUTO: 84.1 %
PLATELET # BLD AUTO: 170 X10(3)/MCL (ref 130–400)
PLATELET # BLD AUTO: 193 X10(3)/MCL (ref 130–400)
PLATELET # BLD AUTO: 213 X10(3)/MCL (ref 140–371)
PLATELET # BLD AUTO: 218 X10(3)/MCL (ref 130–400)
PMV BLD AUTO: 7.8 FL (ref 7.4–10.4)
PMV BLD AUTO: 7.8 FL (ref 7.4–10.4)
PMV BLD AUTO: 7.9 FL (ref 7.4–10.4)
PMV BLD AUTO: 8.3 FL (ref 9.4–12.4)
POTASSIUM SERPL-SCNC: 3.7 MMOL/L (ref 3.5–5.1)
POTASSIUM SERPL-SCNC: 3.8 MMOL/L (ref 3.5–5.1)
POTASSIUM SERPL-SCNC: 4.1 MMOL/L (ref 3.5–5.1)
POTASSIUM SERPL-SCNC: 4.5 MMOL/L (ref 3.5–5.1)
PROT SERPL-MCNC: 6 GM/DL (ref 5.8–7.6)
PROT SERPL-MCNC: 6.5 GM/DL (ref 5.8–7.6)
PROT SERPL-MCNC: 6.7 GM/DL (ref 5.8–7.6)
PROT SERPL-MCNC: 7 GM/DL (ref 5.8–7.6)
PSA FREE MFR SERPL: 14 %
PSA FREE MFR SERPL: 15 %
PSA FREE MFR SERPL: 15 %
PSA FREE MFR SERPL: 17 %
PSA FREE SERPL-MCNC: 13.32 NG/ML
PSA FREE SERPL-MCNC: 28.25 NG/ML
PSA FREE SERPL-MCNC: 3.72 NG/ML
PSA FREE SERPL-MCNC: 4.54 NG/ML
PSA SERPL-MCNC: 183.11 NG/ML
PSA SERPL-MCNC: 25.35 NG/ML
PSA SERPL-MCNC: 26.75 NG/ML
PSA SERPL-MCNC: 93.39 NG/ML
RBC # BLD AUTO: 3.51 X10(6)/MCL (ref 4.7–6.1)
RBC # BLD AUTO: 3.88 X10(6)/MCL (ref 4.7–6.1)
RBC # BLD AUTO: 4.04 X10(6)/MCL (ref 4.7–6.1)
RBC # BLD AUTO: 4.27 X10(6)/MCL (ref 4.7–6.1)
SODIUM SERPL-SCNC: 138 MMOL/L (ref 136–145)
SODIUM SERPL-SCNC: 140 MMOL/L (ref 136–145)
SODIUM SERPL-SCNC: 141 MMOL/L (ref 136–145)
SODIUM SERPL-SCNC: 144 MMOL/L (ref 136–145)
WBC # SPEC AUTO: 4.5 X10(3)/MCL (ref 4.5–11.5)
WBC # SPEC AUTO: 4.7 X10(3)/MCL (ref 4.5–11.5)
WBC # SPEC AUTO: 5.6 X10(3)/MCL (ref 4.5–11.5)
WBC # SPEC AUTO: 7.1 X10(3)/MCL (ref 4.5–11.5)

## 2022-01-01 PROCEDURE — 3078F DIAST BP <80 MM HG: CPT | Mod: CPTII,S$GLB,, | Performed by: INTERNAL MEDICINE

## 2022-01-01 PROCEDURE — 3075F PR MOST RECENT SYSTOLIC BLOOD PRESS GE 130-139MM HG: ICD-10-PCS | Mod: CPTII,S$GLB,, | Performed by: INTERNAL MEDICINE

## 2022-01-01 PROCEDURE — 3288F FALL RISK ASSESSMENT DOCD: CPT | Mod: CPTII,S$GLB,, | Performed by: INTERNAL MEDICINE

## 2022-01-01 PROCEDURE — 96413 CHEMO IV INFUSION 1 HR: CPT

## 2022-01-01 PROCEDURE — 1101F PT FALLS ASSESS-DOCD LE1/YR: CPT | Mod: CPTII,S$GLB,, | Performed by: NURSE PRACTITIONER

## 2022-01-01 PROCEDURE — 3078F PR MOST RECENT DIASTOLIC BLOOD PRESSURE < 80 MM HG: ICD-10-PCS | Mod: CPTII,S$GLB,, | Performed by: INTERNAL MEDICINE

## 2022-01-01 PROCEDURE — 85651 RBC SED RATE NONAUTOMATED: CPT

## 2022-01-01 PROCEDURE — 1160F PR REVIEW ALL MEDS BY PRESCRIBER/CLIN PHARMACIST DOCUMENTED: ICD-10-PCS | Mod: CPTII,S$GLB,, | Performed by: NURSE PRACTITIONER

## 2022-01-01 PROCEDURE — 85025 COMPLETE CBC W/AUTO DIFF WBC: CPT

## 2022-01-01 PROCEDURE — 3288F PR FALLS RISK ASSESSMENT DOCUMENTED: ICD-10-PCS | Mod: CPTII,S$GLB,, | Performed by: NURSE PRACTITIONER

## 2022-01-01 PROCEDURE — 3008F BODY MASS INDEX DOCD: CPT | Mod: CPTII,S$GLB,, | Performed by: INTERNAL MEDICINE

## 2022-01-01 PROCEDURE — 96365 THER/PROPH/DIAG IV INF INIT: CPT

## 2022-01-01 PROCEDURE — 84154 ASSAY OF PSA FREE: CPT

## 2022-01-01 PROCEDURE — 36415 COLL VENOUS BLD VENIPUNCTURE: CPT

## 2022-01-01 PROCEDURE — 63600175 PHARM REV CODE 636 W HCPCS: Mod: JG | Performed by: NURSE PRACTITIONER

## 2022-01-01 PROCEDURE — 1101F PT FALLS ASSESS-DOCD LE1/YR: CPT | Mod: CPTII,S$GLB,, | Performed by: INTERNAL MEDICINE

## 2022-01-01 PROCEDURE — 1126F AMNT PAIN NOTED NONE PRSNT: CPT | Mod: CPTII,S$GLB,, | Performed by: NURSE PRACTITIONER

## 2022-01-01 PROCEDURE — 99215 OFFICE O/P EST HI 40 MIN: CPT | Mod: S$GLB,,, | Performed by: NURSE PRACTITIONER

## 2022-01-01 PROCEDURE — 3008F PR BODY MASS INDEX (BMI) DOCUMENTED: ICD-10-PCS | Mod: CPTII,S$GLB,, | Performed by: NURSE PRACTITIONER

## 2022-01-01 PROCEDURE — 99999 PR PBB SHADOW E&M-EST. PATIENT-LVL IV: ICD-10-PCS | Mod: PBBFAC,,, | Performed by: INTERNAL MEDICINE

## 2022-01-01 PROCEDURE — 25000003 PHARM REV CODE 250: Performed by: INTERNAL MEDICINE

## 2022-01-01 PROCEDURE — 96375 TX/PRO/DX INJ NEW DRUG ADDON: CPT

## 2022-01-01 PROCEDURE — 3008F PR BODY MASS INDEX (BMI) DOCUMENTED: ICD-10-PCS | Mod: CPTII,S$GLB,, | Performed by: INTERNAL MEDICINE

## 2022-01-01 PROCEDURE — 3288F FALL RISK ASSESSMENT DOCD: CPT | Mod: CPTII,S$GLB,, | Performed by: NURSE PRACTITIONER

## 2022-01-01 PROCEDURE — 1159F MED LIST DOCD IN RCRD: CPT | Mod: CPTII,S$GLB,, | Performed by: INTERNAL MEDICINE

## 2022-01-01 PROCEDURE — 84153 ASSAY OF PSA TOTAL: CPT

## 2022-01-01 PROCEDURE — 1160F PR REVIEW ALL MEDS BY PRESCRIBER/CLIN PHARMACIST DOCUMENTED: ICD-10-PCS | Mod: CPTII,S$GLB,, | Performed by: INTERNAL MEDICINE

## 2022-01-01 PROCEDURE — 1126F AMNT PAIN NOTED NONE PRSNT: CPT | Mod: CPTII,S$GLB,, | Performed by: INTERNAL MEDICINE

## 2022-01-01 PROCEDURE — 99999 PR PBB SHADOW E&M-EST. PATIENT-LVL V: CPT | Mod: PBBFAC,,, | Performed by: NURSE PRACTITIONER

## 2022-01-01 PROCEDURE — 1126F PR PAIN SEVERITY QUANTIFIED, NO PAIN PRESENT: ICD-10-PCS | Mod: CPTII,S$GLB,, | Performed by: INTERNAL MEDICINE

## 2022-01-01 PROCEDURE — 1159F PR MEDICATION LIST DOCUMENTED IN MEDICAL RECORD: ICD-10-PCS | Mod: CPTII,S$GLB,, | Performed by: INTERNAL MEDICINE

## 2022-01-01 PROCEDURE — 3074F SYST BP LT 130 MM HG: CPT | Mod: CPTII,S$GLB,, | Performed by: NURSE PRACTITIONER

## 2022-01-01 PROCEDURE — 3288F PR FALLS RISK ASSESSMENT DOCUMENTED: ICD-10-PCS | Mod: CPTII,S$GLB,, | Performed by: INTERNAL MEDICINE

## 2022-01-01 PROCEDURE — 1101F PR PT FALLS ASSESS DOC 0-1 FALLS W/OUT INJ PAST YR: ICD-10-PCS | Mod: CPTII,S$GLB,, | Performed by: INTERNAL MEDICINE

## 2022-01-01 PROCEDURE — 99214 PR OFFICE/OUTPT VISIT, EST, LEVL IV, 30-39 MIN: ICD-10-PCS | Mod: S$GLB,,, | Performed by: INTERNAL MEDICINE

## 2022-01-01 PROCEDURE — 3078F DIAST BP <80 MM HG: CPT | Mod: CPTII,S$GLB,, | Performed by: NURSE PRACTITIONER

## 2022-01-01 PROCEDURE — 3078F PR MOST RECENT DIASTOLIC BLOOD PRESSURE < 80 MM HG: ICD-10-PCS | Mod: CPTII,S$GLB,, | Performed by: NURSE PRACTITIONER

## 2022-01-01 PROCEDURE — 1160F RVW MEDS BY RX/DR IN RCRD: CPT | Mod: CPTII,S$GLB,, | Performed by: INTERNAL MEDICINE

## 2022-01-01 PROCEDURE — 1159F MED LIST DOCD IN RCRD: CPT | Mod: CPTII,S$GLB,, | Performed by: NURSE PRACTITIONER

## 2022-01-01 PROCEDURE — 80053 COMPREHEN METABOLIC PANEL: CPT

## 2022-01-01 PROCEDURE — 1101F PR PT FALLS ASSESS DOC 0-1 FALLS W/OUT INJ PAST YR: ICD-10-PCS | Mod: CPTII,S$GLB,, | Performed by: NURSE PRACTITIONER

## 2022-01-01 PROCEDURE — 99999 PR PBB SHADOW E&M-EST. PATIENT-LVL IV: CPT | Mod: PBBFAC,,, | Performed by: INTERNAL MEDICINE

## 2022-01-01 PROCEDURE — 25000003 PHARM REV CODE 250: Performed by: NURSE PRACTITIONER

## 2022-01-01 PROCEDURE — 99214 OFFICE O/P EST MOD 30 MIN: CPT | Mod: S$GLB,,, | Performed by: INTERNAL MEDICINE

## 2022-01-01 PROCEDURE — 78815 PET IMAGE W/CT SKULL-THIGH: CPT | Mod: TC

## 2022-01-01 PROCEDURE — 83615 LACTATE (LD) (LDH) ENZYME: CPT

## 2022-01-01 PROCEDURE — 1160F RVW MEDS BY RX/DR IN RCRD: CPT | Mod: CPTII,S$GLB,, | Performed by: NURSE PRACTITIONER

## 2022-01-01 PROCEDURE — 3074F SYST BP LT 130 MM HG: CPT | Mod: CPTII,S$GLB,, | Performed by: INTERNAL MEDICINE

## 2022-01-01 PROCEDURE — 3074F PR MOST RECENT SYSTOLIC BLOOD PRESSURE < 130 MM HG: ICD-10-PCS | Mod: CPTII,S$GLB,, | Performed by: NURSE PRACTITIONER

## 2022-01-01 PROCEDURE — 99999 PR PBB SHADOW E&M-EST. PATIENT-LVL V: ICD-10-PCS | Mod: PBBFAC,,, | Performed by: NURSE PRACTITIONER

## 2022-01-01 PROCEDURE — 3074F PR MOST RECENT SYSTOLIC BLOOD PRESSURE < 130 MM HG: ICD-10-PCS | Mod: CPTII,S$GLB,, | Performed by: INTERNAL MEDICINE

## 2022-01-01 PROCEDURE — A4216 STERILE WATER/SALINE, 10 ML: HCPCS | Performed by: INTERNAL MEDICINE

## 2022-01-01 PROCEDURE — 1159F PR MEDICATION LIST DOCUMENTED IN MEDICAL RECORD: ICD-10-PCS | Mod: CPTII,S$GLB,, | Performed by: NURSE PRACTITIONER

## 2022-01-01 PROCEDURE — 63600175 PHARM REV CODE 636 W HCPCS: Performed by: INTERNAL MEDICINE

## 2022-01-01 PROCEDURE — 1126F PR PAIN SEVERITY QUANTIFIED, NO PAIN PRESENT: ICD-10-PCS | Mod: CPTII,S$GLB,, | Performed by: NURSE PRACTITIONER

## 2022-01-01 PROCEDURE — 3008F BODY MASS INDEX DOCD: CPT | Mod: CPTII,S$GLB,, | Performed by: NURSE PRACTITIONER

## 2022-01-01 PROCEDURE — 3075F SYST BP GE 130 - 139MM HG: CPT | Mod: CPTII,S$GLB,, | Performed by: INTERNAL MEDICINE

## 2022-01-01 PROCEDURE — 99215 PR OFFICE/OUTPT VISIT, EST, LEVL V, 40-54 MIN: ICD-10-PCS | Mod: S$GLB,,, | Performed by: NURSE PRACTITIONER

## 2022-01-01 PROCEDURE — 96366 THER/PROPH/DIAG IV INF ADDON: CPT

## 2022-01-01 RX ORDER — HEPARIN 100 UNIT/ML
500 SYRINGE INTRAVENOUS
Status: CANCELLED | OUTPATIENT
Start: 2022-01-01

## 2022-01-01 RX ORDER — SODIUM CHLORIDE 0.9 % (FLUSH) 0.9 %
10 SYRINGE (ML) INJECTION
Status: CANCELLED | OUTPATIENT
Start: 2022-01-01

## 2022-01-01 RX ORDER — SODIUM CHLORIDE 0.9 % (FLUSH) 0.9 %
10 SYRINGE (ML) INJECTION
Status: DISCONTINUED | OUTPATIENT
Start: 2022-01-01 | End: 2022-01-01 | Stop reason: HOSPADM

## 2022-01-01 RX ORDER — HEPARIN 100 UNIT/ML
500 SYRINGE INTRAVENOUS
Status: DISCONTINUED | OUTPATIENT
Start: 2022-01-01 | End: 2022-01-01 | Stop reason: HOSPADM

## 2022-01-01 RX ADMIN — PALONOSETRON 0.25 MG: 0.25 INJECTION, SOLUTION INTRAVENOUS at 03:12

## 2022-01-01 RX ADMIN — FOSAPREPITANT DIMEGLUMINE 150 MG: 150 INJECTION, POWDER, LYOPHILIZED, FOR SOLUTION INTRAVENOUS at 03:12

## 2022-01-01 RX ADMIN — FOSAPREPITANT DIMEGLUMINE 150 MG: 150 INJECTION, POWDER, LYOPHILIZED, FOR SOLUTION INTRAVENOUS at 02:12

## 2022-01-01 RX ADMIN — DOCETAXEL 160 MG: 20 INJECTION, SOLUTION, CONCENTRATE INTRAVENOUS at 04:12

## 2022-01-01 RX ADMIN — PALONOSETRON 0.25 MG: 0.25 INJECTION, SOLUTION INTRAVENOUS at 01:12

## 2022-01-01 RX ADMIN — FOSAPREPITANT DIMEGLUMINE 150 MG: 150 INJECTION, POWDER, LYOPHILIZED, FOR SOLUTION INTRAVENOUS at 09:11

## 2022-01-01 RX ADMIN — HEPARIN 500 UNITS: 100 SYRINGE at 04:12

## 2022-01-01 RX ADMIN — DOCETAXEL 160 MG: 20 INJECTION, SOLUTION, CONCENTRATE INTRAVENOUS at 09:11

## 2022-01-01 RX ADMIN — PALONOSETRON 0.25 MG: 0.25 INJECTION, SOLUTION INTRAVENOUS at 09:11

## 2022-01-01 RX ADMIN — HEPARIN 500 UNITS: 100 SYRINGE at 11:11

## 2022-01-01 RX ADMIN — DOCETAXEL 160 MG: 20 INJECTION, SOLUTION, CONCENTRATE INTRAVENOUS at 02:12

## 2022-01-01 RX ADMIN — HEPARIN 500 UNITS: 100 SYRINGE at 05:12

## 2022-01-01 RX ADMIN — Medication 10 ML: at 04:12

## 2022-01-06 ENCOUNTER — HISTORICAL (OUTPATIENT)
Dept: HEMATOLOGY/ONCOLOGY | Facility: CLINIC | Age: 67
End: 2022-01-06

## 2022-01-06 LAB
ABS NEUT (OLG): 2.63 X10(3)/MCL (ref 2.1–9.2)
ANION GAP SERPL CALC-SCNC: 19 MMOL/L
BASOPHILS # BLD AUTO: 0 X10(3)/MCL (ref 0–0.2)
BASOPHILS NFR BLD AUTO: 0.9 %
BUN SERPL-MCNC: 10 MG/DL (ref 8–26)
CHLORIDE SERPL-SCNC: 98 MMOL/L (ref 98–109)
CREAT SERPL-MCNC: 0.5 MG/DL (ref 0.6–1.3)
EOSINOPHIL # BLD AUTO: 0 X10(3)/MCL (ref 0–0.9)
EOSINOPHIL NFR BLD AUTO: 1.3 %
ERYTHROCYTE [DISTWIDTH] IN BLOOD BY AUTOMATED COUNT: 16.3 % (ref 11.5–17)
GLUCOSE SERPL-MCNC: 152 MG/DL (ref 70–105)
HCT VFR BLD AUTO: 28.8 % (ref 42–52)
HCT VFR BLD CALC: 30 % (ref 38–51)
HGB BLD-MCNC: 10.2 MG/DL (ref 12–17)
HGB BLD-MCNC: 9.2 GM/DL (ref 14–18)
LYMPHOCYTES # BLD AUTO: 0.3 X10(3)/MCL (ref 0.6–4.6)
LYMPHOCYTES NFR BLD AUTO: 10.7 %
MCH RBC QN AUTO: 28.7 PG (ref 27–31)
MCHC RBC AUTO-ENTMCNC: 31.9 GM/DL (ref 33–36)
MCV RBC AUTO: 89.7 FL (ref 80–94)
MONOCYTES # BLD AUTO: 0.1 X10(3)/MCL (ref 0.1–1.3)
MONOCYTES NFR BLD AUTO: 3.4 %
NEUTROPHILS # BLD AUTO: 2.6 X10(3)/MCL (ref 2.1–9.2)
NEUTROPHILS NFR BLD AUTO: 82.4 %
PLATELET # BLD AUTO: 308 X10(3)/MCL (ref 130–400)
PMV BLD AUTO: 8.5 FL (ref 9.4–12.4)
POC IONIZED CALCIUM: 1.23 MMOL/L (ref 1.12–1.32)
POC TCO2: 25 MMOL/L (ref 24–29)
POTASSIUM BLD-SCNC: 3 MMOL/L (ref 3.5–4.9)
RBC # BLD AUTO: 3.21 X10(6)/MCL (ref 4.7–6.1)
SODIUM BLD-SCNC: 138 MMOL/L (ref 138–146)
WBC # SPEC AUTO: 3.2 X10(3)/MCL (ref 4.5–11.5)

## 2022-01-12 ENCOUNTER — HISTORICAL (OUTPATIENT)
Dept: RADIOLOGY | Facility: HOSPITAL | Age: 67
End: 2022-01-12

## 2022-01-13 ENCOUNTER — HISTORICAL (OUTPATIENT)
Dept: INFUSION THERAPY | Facility: HOSPITAL | Age: 67
End: 2022-01-13

## 2022-01-13 LAB
ABS NEUT (OLG): 0.8 X10(3)/MCL (ref 2.1–9.2)
ALBUMIN SERPL-MCNC: 3.2 GM/DL (ref 3.4–4.8)
ALBUMIN/GLOB SERPL: 1.1 RATIO (ref 1.1–2)
ALP SERPL-CCNC: 100 UNIT/L (ref 40–150)
ALT SERPL-CCNC: 15 UNIT/L (ref 0–55)
AST SERPL-CCNC: 22 UNIT/L (ref 5–34)
BASOPHILS # BLD AUTO: 0 X10(3)/MCL (ref 0–0.2)
BASOPHILS NFR BLD AUTO: 1.8 %
BILIRUB SERPL-MCNC: 0.3 MG/DL
BILIRUBIN DIRECT+TOT PNL SERPL-MCNC: 0.1 MG/DL (ref 0–0.8)
BILIRUBIN DIRECT+TOT PNL SERPL-MCNC: 0.2 MG/DL (ref 0–0.5)
BUN SERPL-MCNC: 7.6 MG/DL (ref 8.4–25.7)
CALCIUM SERPL-MCNC: 9.3 MG/DL (ref 8.7–10.5)
CHLORIDE SERPL-SCNC: 101 MMOL/L (ref 98–107)
CO2 SERPL-SCNC: 26 MMOL/L (ref 23–31)
CREAT SERPL-MCNC: 0.68 MG/DL (ref 0.73–1.18)
EOSINOPHIL # BLD AUTO: 0.2 X10(3)/MCL (ref 0–0.9)
EOSINOPHIL NFR BLD AUTO: 8 %
ERYTHROCYTE [DISTWIDTH] IN BLOOD BY AUTOMATED COUNT: 17.8 % (ref 11.5–17)
GLOBULIN SER-MCNC: 3 GM/DL (ref 2.4–3.5)
GLUCOSE SERPL-MCNC: 100 MG/DL (ref 82–115)
HCT VFR BLD AUTO: 31.5 % (ref 42–52)
HGB BLD-MCNC: 9.8 GM/DL (ref 14–18)
LYMPHOCYTES # BLD AUTO: 0.4 X10(3)/MCL (ref 0.6–4.6)
LYMPHOCYTES NFR BLD AUTO: 15.6 %
MCH RBC QN AUTO: 28.7 PG (ref 27–31)
MCHC RBC AUTO-ENTMCNC: 31.1 GM/DL (ref 33–36)
MCV RBC AUTO: 92.1 FL (ref 80–94)
MONOCYTES # BLD AUTO: 1 X10(3)/MCL (ref 0.1–1.3)
MONOCYTES NFR BLD AUTO: 35.5 %
NEUTROPHILS # BLD AUTO: 0.8 X10(3)/MCL (ref 2.1–9.2)
NEUTROPHILS NFR BLD AUTO: 29 %
PLATELET # BLD AUTO: 420 X10(3)/MCL (ref 130–400)
PMV BLD AUTO: 8.3 FL (ref 9.4–12.4)
POTASSIUM SERPL-SCNC: 3.5 MMOL/L (ref 3.5–5.1)
PROT SERPL-MCNC: 6.2 GM/DL (ref 5.8–7.6)
RBC # BLD AUTO: 3.42 X10(6)/MCL (ref 4.7–6.1)
SODIUM SERPL-SCNC: 139 MMOL/L (ref 136–145)
WBC # SPEC AUTO: 2.8 X10(3)/MCL (ref 4.5–11.5)

## 2022-01-26 ENCOUNTER — HISTORICAL (OUTPATIENT)
Dept: INFUSION THERAPY | Facility: HOSPITAL | Age: 67
End: 2022-01-26

## 2022-01-26 LAB
ABS NEUT (OLG): 1.13 X10(3)/MCL (ref 2.1–9.2)
ALBUMIN SERPL-MCNC: 3 GM/DL (ref 3.4–4.8)
ALBUMIN/GLOB SERPL: 1 RATIO (ref 1.1–2)
ALP SERPL-CCNC: 86 UNIT/L (ref 40–150)
ALT SERPL-CCNC: 18 UNIT/L (ref 0–55)
AST SERPL-CCNC: 28 UNIT/L (ref 5–34)
BASOPHILS # BLD AUTO: 0 X10(3)/MCL (ref 0–0.2)
BASOPHILS NFR BLD AUTO: 0.8 %
BILIRUB SERPL-MCNC: 0.2 MG/DL
BILIRUBIN DIRECT+TOT PNL SERPL-MCNC: 0.1 MG/DL (ref 0–0.5)
BILIRUBIN DIRECT+TOT PNL SERPL-MCNC: 0.1 MG/DL (ref 0–0.8)
BUN SERPL-MCNC: 7.8 MG/DL (ref 8.4–25.7)
CALCIUM SERPL-MCNC: 9.2 MG/DL (ref 8.7–10.5)
CHLORIDE SERPL-SCNC: 98 MMOL/L (ref 98–107)
CO2 SERPL-SCNC: 32 MMOL/L (ref 23–31)
CREAT SERPL-MCNC: 0.64 MG/DL (ref 0.73–1.18)
EOSINOPHIL # BLD AUTO: 0.1 X10(3)/MCL (ref 0–0.9)
EOSINOPHIL NFR BLD AUTO: 4.5 %
ERYTHROCYTE [DISTWIDTH] IN BLOOD BY AUTOMATED COUNT: 17.9 % (ref 11.5–17)
ERYTHROCYTE [SEDIMENTATION RATE] IN BLOOD: 111 MM/HR (ref 0–15)
GLOBULIN SER-MCNC: 3 GM/DL (ref 2.4–3.5)
GLUCOSE SERPL-MCNC: 111 MG/DL (ref 82–115)
HCT VFR BLD AUTO: 27.2 % (ref 42–52)
HGB BLD-MCNC: 8.5 GM/DL (ref 14–18)
LDH SERPL-CCNC: 232 UNIT/L (ref 140–271)
LYMPHOCYTES # BLD AUTO: 0.3 X10(3)/MCL (ref 0.6–4.6)
LYMPHOCYTES NFR BLD AUTO: 12 %
MCH RBC QN AUTO: 28.1 PG (ref 27–31)
MCHC RBC AUTO-ENTMCNC: 31.3 GM/DL (ref 33–36)
MCV RBC AUTO: 89.8 FL (ref 80–94)
MONOCYTES # BLD AUTO: 0.8 X10(3)/MCL (ref 0.1–1.3)
MONOCYTES NFR BLD AUTO: 32 %
NEUTROPHILS # BLD AUTO: 1.1 X10(3)/MCL (ref 2.1–9.2)
NEUTROPHILS NFR BLD AUTO: 42.4 %
PLATELET # BLD AUTO: 475 X10(3)/MCL (ref 130–400)
PMV BLD AUTO: 8.3 FL (ref 9.4–12.4)
POTASSIUM SERPL-SCNC: 3.1 MMOL/L (ref 3.5–5.1)
PROT SERPL-MCNC: 6 GM/DL (ref 5.8–7.6)
PSA SERPL-MCNC: 0.21 NG/ML
RBC # BLD AUTO: 3.03 X10(6)/MCL (ref 4.7–6.1)
SODIUM SERPL-SCNC: 140 MMOL/L (ref 136–145)
WBC # SPEC AUTO: 2.7 X10(3)/MCL (ref 4.5–11.5)

## 2022-02-02 ENCOUNTER — HISTORICAL (OUTPATIENT)
Dept: INFUSION THERAPY | Facility: HOSPITAL | Age: 67
End: 2022-02-02

## 2022-02-10 ENCOUNTER — HISTORICAL (OUTPATIENT)
Dept: INFUSION THERAPY | Facility: HOSPITAL | Age: 67
End: 2022-02-10

## 2022-02-10 LAB
ABS NEUT (OLG): 0.39 (ref 2.1–9.2)
ALBUMIN SERPL-MCNC: 3.2 G/DL (ref 3.4–4.8)
ALBUMIN/GLOB SERPL: 1.1 {RATIO} (ref 1.1–2)
ALP SERPL-CCNC: 87 U/L (ref 40–150)
ALT SERPL-CCNC: 18 U/L (ref 0–55)
AST SERPL-CCNC: 23 U/L (ref 5–34)
BASOPHILS # BLD AUTO: 0 10*3/UL (ref 0–0.2)
BASOPHILS NFR BLD AUTO: 1.7 %
BILIRUB SERPL-MCNC: 0.3 MG/DL
BILIRUBIN DIRECT+TOT PNL SERPL-MCNC: 0.1 (ref 0–0.5)
BILIRUBIN DIRECT+TOT PNL SERPL-MCNC: 0.2 (ref 0–0.8)
BUN SERPL-MCNC: 8.9 MG/DL (ref 8.4–25.7)
CALCIUM SERPL-MCNC: 9.7 MG/DL (ref 8.7–10.5)
CHLORIDE SERPL-SCNC: 104 MMOL/L (ref 98–107)
CO2 SERPL-SCNC: 30 MMOL/L (ref 23–31)
CREAT SERPL-MCNC: 0.73 MG/DL (ref 0.73–1.18)
EOSINOPHIL # BLD AUTO: 0.2 10*3/UL (ref 0–0.9)
EOSINOPHIL NFR BLD AUTO: 9.8 %
ERYTHROCYTE [DISTWIDTH] IN BLOOD BY AUTOMATED COUNT: 19.5 % (ref 11.5–17)
GLOBULIN SER-MCNC: 2.8 G/DL (ref 2.4–3.5)
GLUCOSE SERPL-MCNC: 114 MG/DL (ref 82–115)
HCT VFR BLD AUTO: 29.7 % (ref 42–52)
HEMOLYSIS INTERF INDEX SERPL-ACNC: <0
HGB BLD-MCNC: 8.9 G/DL (ref 14–18)
ICTERIC INTERF INDEX SERPL-ACNC: 0
LIPEMIC INTERF INDEX SERPL-ACNC: 1
LYMPHOCYTES # BLD AUTO: 0.4 10*3/UL (ref 0.6–4.6)
LYMPHOCYTES NFR BLD AUTO: 25.3 %
MANUAL DIFF? (OHS): NO
MCH RBC QN AUTO: 27.6 PG (ref 27–31)
MCHC RBC AUTO-ENTMCNC: 30 G/DL (ref 33–36)
MCV RBC AUTO: 92.2 FL (ref 80–94)
MONOCYTES # BLD AUTO: 0.6 10*3/UL (ref 0.1–1.3)
MONOCYTES NFR BLD AUTO: 35.1 %
NEUTROPHILS # BLD AUTO: 0.4 10*3/UL (ref 2.1–9.2)
NEUTROPHILS NFR BLD AUTO: 22.4 %
PLATELET # BLD AUTO: 502 10*3/UL (ref 130–400)
PMV BLD AUTO: 8.2 FL (ref 9.4–12.4)
POTASSIUM SERPL-SCNC: 3.3 MMOL/L (ref 3.5–5.1)
PROT SERPL-MCNC: 6 G/DL (ref 5.8–7.6)
RBC # BLD AUTO: 3.22 10*6/UL (ref 4.7–6.1)
SODIUM SERPL-SCNC: 144 MMOL/L (ref 136–145)
WBC # SPEC AUTO: 1.7 10*3/UL (ref 4.5–11.5)

## 2022-02-17 ENCOUNTER — HISTORICAL (OUTPATIENT)
Dept: HEMATOLOGY/ONCOLOGY | Facility: CLINIC | Age: 67
End: 2022-02-17

## 2022-02-17 LAB
ABS NEUT (OLG): 0.86 (ref 2.1–9.2)
ALBUMIN SERPL-MCNC: 3.2 G/DL (ref 3.4–4.8)
ALBUMIN/GLOB SERPL: 1.1 {RATIO} (ref 1.1–2)
ALP SERPL-CCNC: 74 U/L (ref 40–150)
ALT SERPL-CCNC: 17 U/L (ref 0–55)
AST SERPL-CCNC: 19 U/L (ref 5–34)
BASOPHILS # BLD AUTO: 0 10*3/UL (ref 0–0.2)
BASOPHILS NFR BLD AUTO: 2 %
BILIRUB SERPL-MCNC: 0.2 MG/DL
BILIRUBIN DIRECT+TOT PNL SERPL-MCNC: 0.1 (ref 0–0.5)
BILIRUBIN DIRECT+TOT PNL SERPL-MCNC: 0.1 (ref 0–0.8)
BUN SERPL-MCNC: 6.2 MG/DL (ref 8.4–25.7)
CALCIUM SERPL-MCNC: 9.6 MG/DL (ref 8.7–10.5)
CHLORIDE SERPL-SCNC: 105 MMOL/L (ref 98–107)
CO2 SERPL-SCNC: 32 MMOL/L (ref 23–31)
CREAT SERPL-MCNC: 0.74 MG/DL (ref 0.73–1.18)
EOSINOPHIL # BLD AUTO: 0.1 10*3/UL (ref 0–0.9)
EOSINOPHIL NFR BLD AUTO: 6.1 %
ERYTHROCYTE [DISTWIDTH] IN BLOOD BY AUTOMATED COUNT: 18.5 % (ref 11.5–17)
GLOBULIN SER-MCNC: 2.8 G/DL (ref 2.4–3.5)
GLUCOSE SERPL-MCNC: 117 MG/DL (ref 82–115)
HCT VFR BLD AUTO: 27.2 % (ref 42–52)
HEMOLYSIS INTERF INDEX SERPL-ACNC: 0
HGB BLD-MCNC: 8.3 G/DL (ref 14–18)
ICTERIC INTERF INDEX SERPL-ACNC: 0
LIPEMIC INTERF INDEX SERPL-ACNC: <0
LYMPHOCYTES # BLD AUTO: 0.4 10*3/UL (ref 0.6–4.6)
LYMPHOCYTES NFR BLD AUTO: 27.2 %
MANUAL DIFF? (OHS): NO
MCH RBC QN AUTO: 27.6 PG (ref 27–31)
MCHC RBC AUTO-ENTMCNC: 30.5 G/DL (ref 33–36)
MCV RBC AUTO: 90.4 FL (ref 80–94)
MONOCYTES # BLD AUTO: 0.1 10*3/UL (ref 0.1–1.3)
MONOCYTES NFR BLD AUTO: 4.1 %
NEUTROPHILS # BLD AUTO: 0.9 10*3/UL (ref 2.1–9.2)
NEUTROPHILS NFR BLD AUTO: 58.6 %
PLATELET # BLD AUTO: 333 10*3/UL (ref 130–400)
PMV BLD AUTO: 8.7 FL (ref 9.4–12.4)
POTASSIUM SERPL-SCNC: 3.5 MMOL/L (ref 3.5–5.1)
PROT SERPL-MCNC: 6 G/DL (ref 5.8–7.6)
RBC # BLD AUTO: 3.01 10*6/UL (ref 4.7–6.1)
SODIUM SERPL-SCNC: 140 MMOL/L (ref 136–145)
WBC # SPEC AUTO: 1.5 10*3/UL (ref 4.5–11.5)

## 2022-02-23 ENCOUNTER — HISTORICAL (OUTPATIENT)
Dept: INFUSION THERAPY | Facility: HOSPITAL | Age: 67
End: 2022-02-23

## 2022-02-23 LAB
ABS NEUT (OLG): 0.16 (ref 2.1–9.2)
ALBUMIN SERPL-MCNC: 3.1 G/DL (ref 3.4–4.8)
ALBUMIN/GLOB SERPL: 1.1 {RATIO} (ref 1.1–2)
ALP SERPL-CCNC: 88 U/L (ref 40–150)
ALT SERPL-CCNC: 14 U/L (ref 0–55)
AST SERPL-CCNC: 18 U/L (ref 5–34)
BASOPHILS # BLD AUTO: 0 10*3/UL (ref 0–0.2)
BASOPHILS NFR BLD AUTO: 2.3 %
BILIRUB SERPL-MCNC: 0.4 MG/DL
BILIRUBIN DIRECT+TOT PNL SERPL-MCNC: 0.2 (ref 0–0.5)
BILIRUBIN DIRECT+TOT PNL SERPL-MCNC: 0.2 (ref 0–0.8)
BUN SERPL-MCNC: 6.4 MG/DL (ref 8.4–25.7)
CALCIUM SERPL-MCNC: 9.2 MG/DL (ref 8.7–10.5)
CHLORIDE SERPL-SCNC: 101 MMOL/L (ref 98–107)
CO2 SERPL-SCNC: 29 MMOL/L (ref 23–31)
CREAT SERPL-MCNC: 0.67 MG/DL (ref 0.73–1.18)
EOSINOPHIL # BLD AUTO: 0.2 10*3/UL (ref 0–0.9)
EOSINOPHIL NFR BLD AUTO: 13.6 %
ERYTHROCYTE [DISTWIDTH] IN BLOOD BY AUTOMATED COUNT: 20.4 % (ref 11.5–17)
ERYTHROCYTE [SEDIMENTATION RATE] IN BLOOD: 78 MM/H (ref 0–15)
GLOBULIN SER-MCNC: 2.7 G/DL (ref 2.4–3.5)
GLUCOSE SERPL-MCNC: 95 MG/DL (ref 82–115)
HCT VFR BLD AUTO: 25.7 % (ref 42–52)
HEMOLYSIS INTERF INDEX SERPL-ACNC: <0
HGB BLD-MCNC: 7.9 G/DL (ref 14–18)
ICTERIC INTERF INDEX SERPL-ACNC: 0
LDH SERPL-CCNC: 161 U/L (ref 140–271)
LIPEMIC INTERF INDEX SERPL-ACNC: <0
LYMPHOCYTES # BLD AUTO: 0.3 10*3/UL (ref 0.6–4.6)
LYMPHOCYTES NFR BLD AUTO: 25 %
MANUAL DIFF? (OHS): NO
MCH RBC QN AUTO: 28.1 PG (ref 27–31)
MCHC RBC AUTO-ENTMCNC: 30.7 G/DL (ref 33–36)
MCV RBC AUTO: 91.5 FL (ref 80–94)
MONOCYTES # BLD AUTO: 0.6 10*3/UL (ref 0.1–1.3)
MONOCYTES NFR BLD AUTO: 43.2 %
NEUTROPHILS # BLD AUTO: 0.2 10*3/UL (ref 2.1–9.2)
NEUTROPHILS NFR BLD AUTO: 12.1 %
PLATELET # BLD AUTO: 399 10*3/UL (ref 130–400)
PMV BLD AUTO: 8.7 FL (ref 9.4–12.4)
POTASSIUM SERPL-SCNC: 3.3 MMOL/L (ref 3.5–5.1)
PROT SERPL-MCNC: 5.8 G/DL (ref 5.8–7.6)
RBC # BLD AUTO: 2.81 10*6/UL (ref 4.7–6.1)
SODIUM SERPL-SCNC: 139 MMOL/L (ref 136–145)
WBC # SPEC AUTO: 1.3 10*3/UL (ref 4.5–11.5)

## 2022-03-11 ENCOUNTER — HISTORICAL (OUTPATIENT)
Dept: ADMINISTRATIVE | Facility: HOSPITAL | Age: 67
End: 2022-03-11

## 2022-03-11 LAB
ABS NEUT (OLG): 3.24 (ref 2.1–9.2)
ALBUMIN SERPL-MCNC: 3.3 G/DL (ref 3.4–4.8)
ALBUMIN/GLOB SERPL: 1.2 {RATIO} (ref 1.1–2)
ALP SERPL-CCNC: 77 U/L (ref 40–150)
ALT SERPL-CCNC: 15 U/L (ref 0–55)
AST SERPL-CCNC: 27 U/L (ref 5–34)
BASOPHILS # BLD AUTO: 0 10*3/UL (ref 0–0.2)
BASOPHILS NFR BLD AUTO: 0.9 %
BILIRUB SERPL-MCNC: 0.2 MG/DL
BILIRUBIN DIRECT+TOT PNL SERPL-MCNC: <0.1 (ref 0–0.5)
BILIRUBIN DIRECT+TOT PNL SERPL-MCNC: >0.1 (ref 0–0.8)
BUN SERPL-MCNC: 5.7 MG/DL (ref 8.4–25.7)
CALCIUM SERPL-MCNC: 9.3 MG/DL (ref 8.7–10.5)
CHLORIDE SERPL-SCNC: 106 MMOL/L (ref 98–107)
CO2 SERPL-SCNC: 30 MMOL/L (ref 23–31)
CREAT SERPL-MCNC: 0.67 MG/DL (ref 0.73–1.18)
EOSINOPHIL # BLD AUTO: 0.3 10*3/UL (ref 0–0.9)
EOSINOPHIL NFR BLD AUTO: 7 %
ERYTHROCYTE [DISTWIDTH] IN BLOOD BY AUTOMATED COUNT: 20.2 % (ref 11.5–17)
ERYTHROCYTE [SEDIMENTATION RATE] IN BLOOD: 53 MM/H (ref 0–15)
GLOBULIN SER-MCNC: 2.7 G/DL (ref 2.4–3.5)
GLUCOSE SERPL-MCNC: 98 MG/DL (ref 82–115)
HCT VFR BLD AUTO: 31.2 % (ref 42–52)
HEMOLYSIS INTERF INDEX SERPL-ACNC: 6
HGB BLD-MCNC: 9.3 G/DL (ref 14–18)
ICTERIC INTERF INDEX SERPL-ACNC: 0
LDH SERPL-CCNC: 244 U/L (ref 140–271)
LIPEMIC INTERF INDEX SERPL-ACNC: 0
LYMPHOCYTES # BLD AUTO: 0.5 10*3/UL (ref 0.6–4.6)
LYMPHOCYTES NFR BLD AUTO: 10.9 %
MANUAL DIFF? (OHS): NO
MCH RBC QN AUTO: 28.8 PG (ref 27–31)
MCHC RBC AUTO-ENTMCNC: 29.8 G/DL (ref 33–36)
MCV RBC AUTO: 96.6 FL (ref 80–94)
MONOCYTES # BLD AUTO: 0.3 10*3/UL (ref 0.1–1.3)
MONOCYTES NFR BLD AUTO: 7 %
NEUTROPHILS # BLD AUTO: 3.2 10*3/UL (ref 2.1–9.2)
NEUTROPHILS NFR BLD AUTO: 73.7 %
PLATELET # BLD AUTO: 297 10*3/UL (ref 130–400)
PMV BLD AUTO: 8.3 FL (ref 9.4–12.4)
POTASSIUM SERPL-SCNC: 3.2 MMOL/L (ref 3.5–5.1)
PROT SERPL-MCNC: 6 G/DL (ref 5.8–7.6)
RBC # BLD AUTO: 3.23 10*6/UL (ref 4.7–6.1)
SODIUM SERPL-SCNC: 145 MMOL/L (ref 136–145)
WBC # SPEC AUTO: 4.4 10*3/UL (ref 4.5–11.5)

## 2022-04-10 ENCOUNTER — HISTORICAL (OUTPATIENT)
Dept: ADMINISTRATIVE | Facility: HOSPITAL | Age: 67
End: 2022-04-10
Payer: COMMERCIAL

## 2022-04-28 VITALS
BODY MASS INDEX: 25.12 KG/M2 | DIASTOLIC BLOOD PRESSURE: 70 MMHG | WEIGHT: 185.44 LBS | OXYGEN SATURATION: 93 % | SYSTOLIC BLOOD PRESSURE: 112 MMHG | HEIGHT: 72 IN

## 2022-04-30 NOTE — OP NOTE
Hugh Neri MD      Patient:   Jeter, Karl Duane            MRN: [id number]            FIN: [id number]               Age:   66 years     Sex:  Male     :  1955   Associated Diagnoses:   None   Author:   Halle MACHUCA, Hugh CATALAN      Surgeon: Hugh Neri MD    Assistant: None    Preoperative Diagnosis: Lymphoma    Postoperative Diagnosis:  Same    Procedure: Right internal jugular Mediport insertion with fluoroscopic and ultrasound guidance    Anesthesia: Local/MAC    Estimated Blood Loss: Less than 15 cc    Intra-operative Findings:  8Fr power port placed  Ultrasound was used to cannulate the Right interal jugular vein without difficulty. Final fluoroscopy revealed the tip of the catheter within the superior vena cava.  The port flushed and aspirated freely.    Procedure in Detail:  After informed consent was obtained patient was brought to the operating room and placed in the supine position.  Sedation was administered by anesthesia.  The upper chest and neck were prepped and draped in sterile fashion.  After infiltration with local anesthesia, under ultrasound guidance the right internal jugular vein was cannulated with a large-bore needle and a guidewire was placed under fluoroscopic guidance into the superior vena cava.  An incision was made below the wire insertion site and a pocket was created for the port over the pectoralis fascia.  A PowerPort was soaked in antimicrobial solution, it was assembled and flushed.  It was placed in the pocket and the catheter was tunneled to the wire insertion site without difficulty.  The catheter was cut to size using fluoroscopic guidance.  An introducer dilator was placed over the guidewire under fluoroscopic vision and the catheter was threaded through the peel-away introducer without difficulty.  Final fluoroscopy revealed the tip of the catheter in good position, no pneumothorax noted.  The port flushed and aspirated freely, a final heparin solution was  instilled.  The port pocket was irrigated with antimicrobial solution, meticulous hemostasis was achieved, it was closed in multiple layers with absorbable suture.  Sterile dressings were applied.  The patient tolerated the procedure well, the mediport is ready/cleared for use.

## 2022-04-30 NOTE — PROGRESS NOTES
Patient:   Jeter, Karl Duane            MRN: [id number]            FIN: [id number]               Age:   66 years     Sex:  Male     :  1955   Associated Diagnoses:   None   Author:   Jatin PALACIOS, Bettye DOMINGUEZ        Referring Physician:  Hugh Neri MD  PCP:  Tory Batista MD  Urology: Pee Knox MD  Radiation Oncology: Tommy Callaway MD      Visit Information     Problem List:   1.  Small lymphocytic lymphoma, Lugano stage III  2.  Bertha 9/10 prostate cancer in  core biopsies  3.  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    Treatment Plan:  1.  Due to transformation of SLL to Hodgkin lymphoma, we will start ABVD--- Cycle #1 on 2021.  2.  Prostate cancer per urology and radiation oncology.    Treatment History:  SLL -- progressive lymphadenopathy on repeat PET 3/4/2021-- Started treatment with Imbruvica 420mg PO daily shortly after 2021.    Diagnosis/Treatment/HPI:   66-year-old male who noticed a lump in his neck in 2020.  He presented to his PCP who ordered an ultrasound of the neck.  Ultrasound on 2020 showed prominent lymphadenopathy involving the supraclavicular regions bilaterally and bilateral cervical chains.  This is concerning for lymphoma or metastatic disease.    He was seen by Dr. Hugh Neri on 2020 and plan was made for excisional biopsy of the lymph node.  Excisional biopsy was done on 2020 and pathology revealed small lymphocytic lymphoma, CD5 positive, CD20 positive, and CD23 positive.  Cyclin D1 was negative.  FISH prognostic panel on the lymph node biopsy revealed a p53 deletion (17p13), a mono allelic deletion of [id number] (13q14), and a deletion of SAMMI (11q22.3).  He was referred to medical oncology for further recommendations.    He presented initially to medical oncology on 2020.  CT scans of the neck, chest, abdomen, and pelvis were ordered at that time.  CT of the  neck showed bilateral upper and lower cervical adenopathy with a right upper cervical lymph node measuring 1.6 x 1.7 cm, a right lower cervical lymph node measuring 1.6 x 2.3 cm, a left upper cervical lymph node measuring 1.5 x 1.8 cm, and a right lower cervical lymph node measuring 2.0 x 2.0 cm.  CT scan of the chest, abdomen, and pelvis revealed lymphadenopathy in the chest, abdomen, and pelvis with mild hepatosplenomegaly.  A right axillary lymph node measured 1.7 x 2.3 cm.  A left axillary lymph node measured 1.6 x 2.8 cm.  A right upper internal mammary lymph node measured 1.6 x 2.0 cm.  The liver was mildly enlarged measuring 17 cm, the spleen was mildly enlarged measuring 14 cm.  There are small upper abdominal and retroperitoneal lymph nodes, and upper retrocaval lymph node measured 1.0 x 2.0 cm and a lower aortocaval lymph node measured 0.9 x 1.5 cm.  There are enlarged bilateral pelvic lymph nodes, a right common iliac lymph node measuring 1.2 x 1.5 cm in the right pelvic sidewall lymph node measuring 1.3 x 2.0 cm.  There was a left external iliac lymph node measuring 1.2 x 2.7 cm.    Of note, due to an elevated PSA and total protein on 6/24/2020, the patient had protein electrophoresis ordered on his serum which was done on 6/25/2020 and revealed an M spike of 0.7.  Corresponding immunofixation showed an unremarkable pattern with no evidence of monoclonal protein apparent.  Patient was seen by Dr. Pee Knox with urology and underwent a prostate biopsy on 9/14/2020.  Pathology revealed adenocarcinoma, Bertha score 9/10 in 12/12 cores.  Per patient report, he had MRI and bone scan at [facility], bone scan was negative and MRI showed pelvic lymphadenopathy.    Repeat CT scan of the neck on 10/6/2020 showed similar count of neck lymphadenopathy with interval mild increase in size of several of the lymph nodes.  A right upper neck lymph node measured 1.8 x 1.9 cm, previously 1.6 x 1.7 cm.  A right lower neck  "lymph node measured 2.7 x 2.5 cm, previously 1.6 x 2.3 cm.  A right lower neck lymph node measured 2.5 x 1.8 cm, previously 2.0 x 2.0 cm.  A left upper neck measured 1.5 x 1.8 cm with no change.  A left lower neck measured 3.2 x 2.2 cm, previously 2.7 x 2.2 cm.  CT of the chest, abdomen, and pelvis on 10/6/2020 showed adenopathy in the mediastinum, both axilla, aorta, and iliac regions that are all stable from previous study.  Hepatosplenomegaly stable from previous study.  No new lesions noted.    Patient started on radiation for prostate cancer on 12/14/2020.      PET/CT scan on 3/4/2021 showed hypermetabolic lymphadenopathy in the neck, chest, and abdomen, some of which have increased in size most significantly in the chest.  There was interval decrease in size of pelvic lymphadenopathy.  He started on abiraterone and prednisone through Dr. Papito Callaway with radiation oncology on 5/7/2021.  Ibrutinib ordered on 5/25/2021 and started shortly after.    PET/CT scan on 8/4/2021 showed disease progression most notable in the chest and abdomen with Deauville score of 5.  Left axillary lymph node biopsy on 8/18/2021 showed transformation SLL in the hospital lymphoma.  We will start ABVD on 9/8/2021.    Baseline ECHO on 9/7/21 showed EF of 60-65%  Baseline PFT on 9/7/21 normal           Chief Complaint    Follow-up       Interval History   Patient here for scheduled follow-up visit. He is s/p C1 D1 and reports he has been struggling with significant constipation following treatment- this has started to also cause issues with nausea as well as intermittent abdominal distention and cramping. He states he is "very nauseated" at this time. Following this statement patient began actively vomiting in clinic. He was added onto infusion schedule today for IV hydration + Zofran.   Remainder of visit/assessment was completed in infusion. We discussed OTC management of constipation to attempt to help control symptoms. Patient denies " CALDERON, SOB, CP. Does report some weakness and decreased PO intake s/t GI upset.   CBC today showed expected cytopenias. Denies fevers or chills.     PMHx:  None     PSHx:   1.  Bone graft 1976  2.  Hernia repair 2015  3.  Knee arthroscopic meniscus repair 2019  4.  Carpal tunnel 2019    Social Hx:   The patient is single, works as a , denies current tobacco use.  He previously smoked 1 pack a day for 3 years and quit in 1972.  He drinks approximately 6 beers a week.    Family Hx:   He has a family history of arthritis and his father had lung disease (interstitial fibrosis).         Review of Systems   14 point review of systems done in full with pertinent positives as described above  Remainder of review systems done in full and unremarkable.   Constitutional:  Fatigue, No fever, No chills.    Eye:  No double vision, No visual disturbances.    Ear/Nose/Mouth/Throat:  Neck pain secondary to lymphadenopathy., No nasal congestion, No sore throat.    Respiratory:  No shortness of breath, No cough.    Cardiovascular:  No chest pain.    Gastrointestinal:  No nausea, No vomiting.    Genitourinary:  No dysuria, No hematuria.    Hematology/Lymphatics:  No bruising tendency, No bleeding tendency.    Musculoskeletal:  Neck pain.    Integumentary:  No rash.    Neurologic:  Numbness, Tingling.    Psychiatric:  No anxiety.       Health Status   Allergies:    Allergic Reactions (Selected)  No Known Allergies  No Known Medication Allergies,    Allergies (2) Active Reaction  No Known Allergies None Documented  No Known Medication Allergies None Documented     Current medications:  (Selected)   Outpatient Medications  Ordered  Toradol: 30 mg, form: Injection, IV Push, Once, first dose 08/18/21 12:00:00 CDT, stop date 08/18/21 12:00:00 CDT, if not given in OR (Do NOT use if renal disease, recent vascular surgery, history of PUD/GI bleed, ASA sensitivity, NSAID induced asthma or aller...  Future  Adriamycin (for IVPB): 52 mg,  form: Soln, IV Piggyback, Once-chemo, Infuse over: 30 minute(s), first dose 09/22/21 8:20:00 CDT, stop date 09/22/21 8:20:00 CDT, Future Order, Days 15  Aloxi (for IVPB): 0.25 mg, form: Injection, IV Piggyback, Once-chemo, Infuse over: 20 minute(s), first dose 09/22/21 8:00:00 CDT, stop date 09/22/21 8:00:00 CDT, Future Order, Days 15  Benadryl 50mg/ml Inj: 25 mg, form: Injection, IV Piggyback, Once-chemo, Infuse over: 20 minute(s), first dose 09/22/21 8:00:00 CDT, stop date 09/22/21 8:00:00 CDT, Routine, Days 15, Future Order  CCA Normal Saline (0.9% NS) - 1000 mL: 1,000, form: Infusion, IV Piggyback, Once-chemo, Infuse over: 2 hr, *Est. first dose 09/20/21 9:35:00 CDT, *Est. stop date 09/20/21 9:35:00 CDT, Future Order, Days 1  Heparin Flush 100 U/mL - 5 mL: 500 units, form: Injection, IV Push, Once-chemo, first dose 09/22/21 10:50:00 CDT, stop date 09/22/21 10:50:00 CDT, Routine, Days 15, Future Order  Zofran (for IVPB): 16 mg, form: Infusion, IV Piggyback, Once-chemo, Infuse over: 20 minute(s), *Est. first dose 09/20/21 9:35:00 CDT, *Est. stop date 09/20/21 9:35:00 CDT, Routine, Days 1, Future Order  bleomycin (for IVPB): 19 units, form: Injection, IV Piggyback, Once-chemo, Infuse over: 30 minute(s), first dose 09/22/21 9:20:00 CDT, stop date 09/22/21 9:20:00 CDT, Future Order, Total Dose of 10 units/m2 including Test Dose of 2 units., Days 15  bleomycin (for IVPB): 2 units, form: Injection, IV Piggyback, Once-chemo, Infuse over: 30 minute(s), first dose 09/22/21 8:50:00 CDT, stop date 09/22/21 8:50:00 CDT, Future Order, 2 units Test Dose. Wait 30 mins, then give remainder of the 10 units/m2 over 30 mins., Days 15...  dacarbazine (for IVPB): 800 mg, form: Injection, IV Piggyback, Once-chemo, Infuse over: 30 minute(s), first dose 09/22/21 10:20:00 CDT, stop date 09/22/21 10:20:00 CDT, Future Order, Days 15  dexamethasone (for IVPB): 10 mg, form: Soln, IV Piggyback, Once-chemo, Infuse over: 20 minute(s), first  dose 09/22/21 8:00:00 CDT, stop date 09/22/21 8:00:00 CDT, Future Order, Days 15  fosaprepitant (for IVPB): 150 mg, form: Powder-Inj, IV Piggyback, Once-chemo, Infuse over: 30 minute(s), first dose 09/22/21 8:00:00 CDT, stop date 09/22/21 8:00:00 CDT, Future Order, Days 15  vinBLAStine (for IVPB): 12 mg, form: Injection, IV Piggyback, Once-chemo, Infuse over: 30 minute(s), first dose 09/22/21 9:50:00 CDT, stop date 09/22/21 9:50:00 CDT, Future Order, Days 15  Pending Complete  midazolam: 2 mg, form: Injection, IV Push, q5min, Order duration: 2 dose(s), first dose 04/30/20 9:30:00 CDT, stop date 04/30/20 9:39:00 CDT, (up to 5 mg for moderate anxiety)  Documented Medications  Documented  Vitamin B Complex oral capsule: 1 tab(s), Oral, Daily, 0 Refill(s)  Vitamin B6 250 mg oral tablet: 1 tab, Oral, Daily, 0 Refill(s)  Vitamin D3 5000 intl units (125 mcg) oral capsule: 5,000 IntUnit = 1 cap(s), Oral, Daily, with food, 0 Refill(s)  Zofran 8 mg oral tablet: See Instructions, PRN PRN as needed for nausea/vomiting, 1 tab(s) Oral TID for 3 days after chemotherapy and q8hr, # 30, 4 Refill(s)  Zytiga 250 mg oral tablet: 1,000 mg = 4 tab(s), Oral, Daily, 0 Refill(s)  calcium citrate: Oral, Daily, Taking 1,000 mg daily, 0 Refill(s)  glutamine oral powder for reconstitution: 20 gms, Oral, Daily, 0 Refill(s)  loratadine 10 mg oral capsule: See Instructions, 1 cap(s) Oral Daily for 5 days after chemotherapy, 0 Refill(s)  naproxen sodium 220 mg oral capsule: See Instructions, 2 cap(s) Oral BID for 3 days after chemotherapy, 0 Refill(s)  niacin 500 mg oral capsule: 500 mg = 1 cap(s), Oral, Daily, # 60 cap(s), 0 Refill(s)  omeprazole 20 mg oral DR capsule: 20 mg = 1 cap(s), Oral, Daily, 0 Refill(s)  prednisONE 5 mg oral tablet: 5 mg = 1 tab(s), Oral, BID  promethazine 25 mg oral tablet: 25 mg = 1 tab(s), Oral, q4hr, PRN PRN as needed for nausea/vomiting, # 30 tab(s), 0 Refill(s)  sucralfate 1 g/10 mL oral suspension: 1 gm = 10 mL,  Oral, QIDACHS, PRN PRN indigestion, # 240 mL, 1 Refill(s)  tamsulosin 0.4 mg oral capsule: 0.4 mg = 1 cap(s), Oral, BID, # 90 cap(s), 0 Refill(s)   Problem list:    All Problems  Colon polyps - hx of / SNOMED CT [id number] / Confirmed  Dyslipidemia / SNOMED CT 3658328240 / Confirmed  Falls / SNOMED CT 9087550 / Confirmed  Hodgkin lymphoma / SNOMED CT 299609966 / Confirmed  HTN (hypertension) / SNOMED CT [id number] / Confirmed  Laboratory finding abnormal / SNOMED CT 1084414606 / Confirmed  reviewed Hep C ab labs, reactive  Small lymphocytic lymphoma / SNOMED CT 475688710 / Confirmed  dx 1 yr ago  Prostate cancer / SNOMED CT 7475503697 / Confirmed  had radiation tx- will start chemo after this surgery  Neuropathy / SNOMED CT 7773665369 / Confirmed  Chemotherapy induced neutropenia / SNOMED CT 8747961296 / Confirmed  Obesity / SNOMED CT 7781897735 / Confirmed  Wellness examination / SNOMED CT 346506525 / Confirmed  Reflux / SNOMED CT [id number] / Confirmed  Reflux / SNOMED CT [id number] / Confirmed  Supraclavicular adenopathy / SNOMED CT 889738 / Confirmed  Resolved: Inguinal hernia / SNOMED CT [id number]  left  Resolved: Lymphoma / SNOMED CT 37641665  Resolved: Prostate cancer / SNOMED CT 6996471958,    Active Problems (15)  Chemotherapy induced neutropenia   Colon polyps - hx of   Dyslipidemia   Falls   Hodgkin lymphoma   HTN (hypertension)   Laboratory finding abnormal   Neuropathy   Obesity   Prostate cancer   Reflux   Reflux   Small lymphocytic lymphoma   Supraclavicular adenopathy   Wellness examination         Physical Examination   Vital Signs   9/20/2021 8:55 CDT       Temperature Oral          36.4 DegC                             Temperature Oral (calculated)             97.52 DegF                             Peripheral Pulse Rate     89 bpm                              Respiratory Rate          18 br/min                             SpO2                      97 %                             Oxygen Therapy            Room air                             Systolic Blood Pressure   138 mmHg                             Diastolic Blood Pressure  87 mmHg                             Blood Pressure Location   Right arm                             Manual Cuff BP            No                             O2 SAT at rest            97 %     Measurements from flowsheet : Measurements   9/20/2021 8:55 CDT       Weight Dosing             85.500 kg                             Weight Measured           85.5 kg                             Weight Measured and Calculated in Lbs     188.49 lb                             Height/Length Dosing      184.00 cm                             Height/Length Measured    184 cm                             BSA Measured              2.09 m2                             Body Mass Index Measured  25.25 kg/m2             General:  Alert and oriented, No acute distress.    Eye:  Pupils are equal, round and reactive to light, Extraocular movements are intact, Normal conjunctiva.    HENT:  Normocephalic, Oral mucosa is moist.    Neck:  Supple, bilateral lymphadenopathy- left cervical with two palpable nodes, decreased since last visit, now noted at approximately 3-4 cm- freely moveable, non tender; right deep cervical 1-2cm ; bilateral supraclavicular lymphadenopathy .    Respiratory:  Lungs are clear to auscultation, Respirations are non-labored, Breath sounds are equal.    Cardiovascular:  Normal rate, Regular rhythm, No edema.    Gastrointestinal:  Soft, Non-tender, Non-distended, Normal bowel sounds.    Integumentary:  Warm, Dry, Pink, Intact.    Neurologic:  Alert, Oriented, Normal sensory, No focal deficits.    Psychiatric:  Cooperative, Appropriate mood & affect.    Lymphatics:  Bilateral cervical, supraclavicular, and axillary lymphadenopathy..    Musculoskeletal:   Normal gait.    Cognition and Speech:  Oriented, Speech clear and coherent, Functional cognition intact.    ECOG Performance Scale: 1- Strenuous physical activity restricted; fully ambulatory and able to carry out light work.      Review / Management   Results review:  Lab results   9/20/2021 8:45 CDT       WBC                       0.9 x10(3)/mcL  CRIT                             RBC                       4.45 x10(6)/mcL  LOW                             Hgb                       12.2 gm/dL  LOW                             Hct                       39.9 %  LOW                             Platelet                  261 x10(3)/mcL                             MCV                       89.7 fL                             MCH                       27.4 pg                             MCHC                      30.6 gm/dL  LOW                             RDW                       15.6 %                             MPV                       8.2 fL  LOW                             Abs Neut                  0.18 x10(3)/mcL  LOW                             NEUT%                     19.7 %  NA                             NEUT#                     0.2 x10(3)/mcL  LOW                             LYMPH%                    29.7 %  NA                             LYMPH#                    0.3 x10(3)/mcL  LOW                             MONO%                     38.5 %  NA                             MONO#                     0.4 x10(3)/mcL                             EOS%                      8.8 %  NA                             EOS#                      0.1 x10(3)/mcL                             BASO%                     2.2 %  NA                             BASO#                     0.0 x10(3)/mcL  .       Impression and Plan   Diagnoses:  1.  Small lymphocytic lymphoma, Lugano stage III with a p53, 11q., and 13q. deletion.  2.  Bertha 9/10 prostate cancer in 12/12 core biopsies    Plan:  At this time, the patient has a diagnosis  of small lymphocytic lymphoma.  He has a Lugano stage III due to lymph nodes above and below the diaphragm.    Given his progressive lymphadenopathy, we started ibrutinib shortly after 5/25/2021 given his p53 mutation.    The patient has a Glide 9/10 prostate cancer.  MRI showed LAD, this looked more like LAD from his SLL.  He started radiation to the prostate and teodoro basin on 12/14/2020- completed on 2/18/2021.  He has now on abiraterone and prednisone for his prostate cancer through Dr. Papito Callaway with radiation oncology, started on 5/7/2021.    PET/CT does show progressing disease with Deauville 5.  Brightest SUV is in his left axillary LN.  Patient underwent LN biopsy, pathology shows transformation to Hodgkin lymphoma.     We will set patient up to be treated with ABVD, cycle 1 day 1 started 9/8/2021- due for C1D15 on 9/22, discussed with BERNA Harrison to proceed with treatment as planned despite cytopenias    Hep C Ab came back reactive- will check HCV RNA to determine if this is active infection.    Patient was actively vomiting in clinic today- given IV hydration + IV Zofran -- improved following treatment    Continue with around the clock antiemetics at home     Recommended OTC Magnesium Citrate for constipation- then transition to daily Miralax + stool softeners     Labs: CBC, CMP, LDH, Uric Acid    He knows to call with any new questions/concerns/worsening symptoms    All questions were answered to the best of my ability.   Bettye HERNANDEZ, FNP-C

## 2022-04-30 NOTE — H&P
Patient:   Jeter, Karl Duane            MRN: [id number]            FIN: [id number]               Age:   66 years     Sex:  Male     :  1955   Associated Diagnoses:   None   Author:   Shima Church      Health Status   The H&P was reviewed, the patient was examined, and there are no changes to the patient's condition..

## 2022-04-30 NOTE — OP NOTE
DATE OF SURGERY:    04/26/2019    SURGEON:  Obie Abarca MD    PREOPERATIVE DIAGNOSIS:  Left knee medial meniscus tear.    POSTOPERATIVE DIAGNOSIS:  Left knee medial meniscus tear.    PROCEDURES:    1. Left knee arthroscopy and medial meniscectomy.   2. Debridement medial femoral condyle and anterior fat pad.    ANESTHESIA:  LMA.    IV FLUIDS:  As per Anesthesia.    ESTIMATED BLOOD LOSS:  Less than 5 cc.    COUNTS:  Correct.    COMPLICATIONS:  None, stable to PACU.    INDICATIONS FOR PROCEDURE:  Mr. Arroyo is a 63-year-old male with continued pain and loss of motion of his left knee.  He had an MRI demonstrating the above findings.  He has failed conservative treatment.  I believe he has overcome his stress fracture and injury.  The risks, benefits, and alternatives were discussed with the patient in detail.  The risks included, but were not limited to, pain, bleeding, infection, damage to blood vessels or nerves, heart attack, stroke, death, need for operation, continued pain, continued loss of motion, retear, post-traumatic arthritis, need for additional surgery.  The patient understood these risks and wanted to proceed with surgical intervention.  Informed consent was obtained.    PROCEDURE IN DETAIL:  The patient was found in preoperative holding by Anesthesia and found fit for surgery.  He is taken to the operating room and placed on the operative table in supine position.  All bony prominences were well padded.  Time-out verified correct patient, correct procedure, and correct site, and all were in agreement.  The patient underwent LMA anesthesia without complications.  He was then prepped and draped in normal sterile fashion, leaving the left lower extremity exposed for surgery.  A well-padded tourniquet placed on the left upper thigh.  Approximate tourniquet time was 19 minutes at 300.  He received his preoperative antibiotics.  After exsanguination of left lower extremity, tourniquet was inflated.    Anterolateral portal was then made through a 1 cm incision.  Camera was introduced in the suprapatellar pouch.  Next, the medial and lateral gutters were inspected and no loose bodies.  He had unstable cartilage of the patellofemoral joint.  Camera was introduced in the medial compartment.  Anteromedial portal was then made through a 1 cm incision.  Next, the patient had a large unstable medial meniscus tear.  It involved the posterior horn, posterior body, and mid body area.  With the use of straight biter and 3.5 shaver, patient under medial meniscectomy.  The remaining meniscus was then probed and found to be stable without any signs of tear or instability.  He did have cartilage changes in the medial compartment.  Camera was introduced in the intercondylar notch where the patient went limited debridement of the anterior fat pad.  He also had debridement of the unstable cartilage of the patellofemoral joint.  ACL and PCL were inspected and found to be intact.  Camera was introduced in the lateral compartment.  Lateral meniscus was then probed and found to be stable without any signs of tear or instability.  He had minimal arthritic changes on the lateral compartment.  Camera was introduced back in the suprapatellar pouch.  All excess fluid was removed.  Tourniquet was released.  Hemostasis was achieved.  Copious irrigation was used to wash the 2 incisions.  The incisions were then closed with 2-0 nylon sutures in standard horizontal mattress configuration.  Xeroform, 4 x 4's, soft tissue dressing were placed on the left lower extremity.  He was awoken by Anesthesia and brought to PACU in stable condition.        ______________________________  MD DORIAN Mccollum/JASPAL  DD:  04/26/2019  Time:  11:09AM  DT:  04/26/2019  Time:  12:01PM  Job #:  [id number]

## 2022-04-30 NOTE — H&P
Patient:   Jeter, Karl Duane            MRN: [id number]            FIN: [id number]               Age:   66 years     Sex:  Male     :  1955   Associated Diagnoses:   None   Author:   Shima Church      Health Status   The H&P was reviewed, the patient was examined, and there are no changes to the patient's condition..

## 2022-04-30 NOTE — OP NOTE
DATE OF SURGERY:        SURGEON:  Claudio Corral MD    PREOPERATIVE DIAGNOSIS:  Carpal tunnel syndrome right hand.    POSTOPERATIVE DIAGNOSIS:  Carpal tunnel syndrome right hand.    PROCEDURE PERFORMED:  Open carpal tunnel release right hand.    INDICATION FOR PROCEDURE:  The patient is a 63-year-old male with carpal tunnel syndrome right hand.  He has an EMG to confirm this.  He presents for release.    ANESTHESIA:  General.    COMPLICATIONS:  None.    PROCEDURE IN DETAIL:  The patient was endotracheally intubated, prepped and draped in the usual sterile fashion.  A 3 cm incision was made over the volar compartment of his right hand using a 15 blade scalpel.  A tenotomy scissors was used to dissect down to the level of the transverse carpal ligament.  This was incised using a 15 blade and completely released using tenotomies under direct visualization.  After this was completed, the skin was closed using interrupted 4-0 nylon.  We then released the tourniquet.  All the fingers were pink at the end of the case.  No complications.  I was scrubbed and present for the entire procedure.        ______________________________  Claudio Corral MD    /[place]  DD:  06/27/2019  Time:  11:09AM  DT:  06/27/2019  Time:  11:16AM  Job #:  [id number]

## 2022-04-30 NOTE — PROGRESS NOTES
Patient:   Jeter, Karl Duane            MRN: [id number]            FIN: [id number]               Age:   66 years     Sex:  Male     :  1955   Associated Diagnoses:   None   Author:   Sweta DO MD, Philippe E        Referring Physician:  Hugh Neri MD  PCP:  Tory Batista MD  Urology: Pee Knox MD  Radiation Oncology: Tommy Callaway MD      Visit Information     Problem List:   1.  Small lymphocytic lymphoma, Lugano stage III  2.  Bertha 9/10 prostate cancer in  core biopsies  3.  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    Treatment Plan:  1.  Due to transformation of SLL to Hodgkin lymphoma, we started ABVD--- Cycle #1 on 2021.  2.  Prostate cancer per urology and radiation oncology.    Treatment History:  SLL -- progressive lymphadenopathy on repeat PET 3/4/2021-- Started treatment with Imbruvica 420mg PO daily shortly after 2021.    Diagnosis/Treatment/HPI:   66-year-old male who noticed a lump in his neck in 2020.  He presented to his PCP who ordered an ultrasound of the neck.  Ultrasound on 2020 showed prominent lymphadenopathy involving the supraclavicular regions bilaterally and bilateral cervical chains.  This is concerning for lymphoma or metastatic disease.    He was seen by Dr. Hugh Neri on 2020 and plan was made for excisional biopsy of the lymph node.  Excisional biopsy was done on 2020 and pathology revealed small lymphocytic lymphoma, CD5 positive, CD20 positive, and CD23 positive.  Cyclin D1 was negative.  FISH prognostic panel on the lymph node biopsy revealed a p53 deletion (17p13), a mono allelic deletion of [id number] (13q14), and a deletion of SAMMI (11q22.3).  He was referred to medical oncology for further recommendations.    He presented initially to medical oncology on 2020.  CT scans of the neck, chest, abdomen, and pelvis were ordered at that time.  CT of the  neck showed bilateral upper and lower cervical adenopathy with a right upper cervical lymph node measuring 1.6 x 1.7 cm, a right lower cervical lymph node measuring 1.6 x 2.3 cm, a left upper cervical lymph node measuring 1.5 x 1.8 cm, and a right lower cervical lymph node measuring 2.0 x 2.0 cm.  CT scan of the chest, abdomen, and pelvis revealed lymphadenopathy in the chest, abdomen, and pelvis with mild hepatosplenomegaly.  A right axillary lymph node measured 1.7 x 2.3 cm.  A left axillary lymph node measured 1.6 x 2.8 cm.  A right upper internal mammary lymph node measured 1.6 x 2.0 cm.  The liver was mildly enlarged measuring 17 cm, the spleen was mildly enlarged measuring 14 cm.  There are small upper abdominal and retroperitoneal lymph nodes, and upper retrocaval lymph node measured 1.0 x 2.0 cm and a lower aortocaval lymph node measured 0.9 x 1.5 cm.  There are enlarged bilateral pelvic lymph nodes, a right common iliac lymph node measuring 1.2 x 1.5 cm in the right pelvic sidewall lymph node measuring 1.3 x 2.0 cm.  There was a left external iliac lymph node measuring 1.2 x 2.7 cm.    Of note, due to an elevated PSA and total protein on 6/24/2020, the patient had protein electrophoresis ordered on his serum which was done on 6/25/2020 and revealed an M spike of 0.7.  Corresponding immunofixation showed an unremarkable pattern with no evidence of monoclonal protein apparent.  Patient was seen by Dr. Pee Knox with urology and underwent a prostate biopsy on 9/14/2020.  Pathology revealed adenocarcinoma, Bertha score 9/10 in 12/12 cores.  Per patient report, he had MRI and bone scan at [facility], bone scan was negative and MRI showed pelvic lymphadenopathy.    Repeat CT scan of the neck on 10/6/2020 showed similar count of neck lymphadenopathy with interval mild increase in size of several of the lymph nodes.  A right upper neck lymph node measured 1.8 x 1.9 cm, previously 1.6 x 1.7 cm.  A right lower neck  lymph node measured 2.7 x 2.5 cm, previously 1.6 x 2.3 cm.  A right lower neck lymph node measured 2.5 x 1.8 cm, previously 2.0 x 2.0 cm.  A left upper neck measured 1.5 x 1.8 cm with no change.  A left lower neck measured 3.2 x 2.2 cm, previously 2.7 x 2.2 cm.  CT of the chest, abdomen, and pelvis on 10/6/2020 showed adenopathy in the mediastinum, both axilla, aorta, and iliac regions that are all stable from previous study.  Hepatosplenomegaly stable from previous study.  No new lesions noted.    Patient started on radiation for prostate cancer on 12/14/2020.      PET/CT scan on 3/4/2021 showed hypermetabolic lymphadenopathy in the neck, chest, and abdomen, some of which have increased in size most significantly in the chest.  There was interval decrease in size of pelvic lymphadenopathy.  He started on abiraterone and prednisone through Dr. Papito Callaway with radiation oncology on 5/7/2021.  Ibrutinib ordered on 5/25/2021 and started shortly after.    PET/CT scan on 8/4/2021 showed disease progression most notable in the chest and abdomen with Deauville score of 5.  Left axillary lymph node biopsy on 8/18/2021 showed transformation SLL in the hospital lymphoma.      Baseline ECHO on 9/7/2021 showed EF of 60-65%.  Baseline PFT on 9/7/2021 normal.  We started ABVD on 9/8/2021.    PET/CT scan at [facility] on 11/11/2021 showed overall response and improving lymphoproliferative disease with a stable dominant sclerotic lesion at T11.  Deauville scores of 3 with one area of 4 in the right axilla (this is however improving).          Chief Complaint    Follow-up    12/1/2021 9:04 CST       patient stated no new problems      Interval History   Patient here for scheduled follow-up visit; brother present. Overall, he is feeling better since last office visit.   He is tolerating treatment well.  He does continue to have constipation, but this is managed much better.  He is taking stool softeners with good relief.      PMHx:  None      PSHx:   1.  Bone graft 1976  2.  Hernia repair 2015  3.  Knee arthroscopic meniscus repair 2019  4.  Carpal tunnel 2019    Social Hx:   The patient is single, works as a , denies current tobacco use.  He previously smoked 1 pack a day for 3 years and quit in 1972.  He drinks approximately 6 beers a week.    Family Hx:   He has a family history of arthritis and his father had lung disease (interstitial fibrosis).         Review of Systems   14 point review of systems done in full with pertinent positives as described above  Remainder of review systems done in full and unremarkable.   Constitutional:  Fatigue, No fever, No chills.    Eye:  No double vision, No visual disturbances.    Ear/Nose/Mouth/Throat:  Neck pain secondary to lymphadenopathy., No nasal congestion, No sore throat.    Respiratory:  No shortness of breath, No cough.    Cardiovascular:  No chest pain.    Gastrointestinal:  No nausea, No vomiting.    Genitourinary:  No dysuria, No hematuria.    Hematology/Lymphatics:  No bruising tendency, No bleeding tendency.    Musculoskeletal:  Neck pain.    Integumentary:  No rash.    Neurologic:  Numbness, Tingling.    Psychiatric:  No anxiety.       Health Status   Allergies:    Allergic Reactions (Selected)  No Known Allergies  No Known Medication Allergies,    Allergies (2) Active Reaction  No Known Allergies None Documented  No Known Medication Allergies None Documented     Current medications:  (Selected)   Inpatient Medications  Ordered  Toradol: 30 mg, form: Injection, IV Push, Once, first dose 08/18/21 12:00:00 CDT, stop date 08/18/21 12:00:00 CDT, if not given in OR (Do NOT use if renal disease, recent vascular surgery, history of PUD/GI bleed, ASA sensitivity, NSAID induced asthma or aller...  Pending Complete  midazolam: 2 mg, form: Injection, IV Push, q5min, Order duration: 2 dose(s), first dose 04/30/20 9:30:00 CDT, stop date 04/30/20 9:39:00 CDT, (up to 5 mg for moderate  anxiety)  Prescriptions  Prescribed  Levaquin 750 mg oral tablet: 750 mg = 1 tab(s), Oral, q24hr, X 7 day(s), # 7 tab(s), 0 Refill(s), Pharmacy: [facility], 83.5, cm, Height/Length Dosing, 11/17/21 8:35:00 CST, 83.5, kg, Weight Dosing, 11/17/21 8:35:00 CST  Linzess 72 mcg oral capsule: 72 mcg = 1 cap(s), Oral, Daily, do not crush or chew, # 90 cap(s), 6 Refill(s), Pharmacy: [facility], 184, cm, Height/Length Dosing, 11/02/21 10:17:00 CDT, 84.8, kg, Weight Dosing, 11/02/21 10:17:00 CDT  promethazine 25 mg oral tablet: 25 mg = 1 tab(s), Oral, q4hr, PRN PRN as needed for nausea/vomiting, # 90 tab(s), 0 Refill(s), Pharmacy: [facility], 184, cm, Height/Length Dosing, 10/04/21 11:04:00 CDT, 84.9, kg, Weight Dosing, 10/04/21 11:04:00 CDT  Documented Medications  Documented  MiraLax: 17 gm, Oral, Daily, 0 Refill(s)  Vitamin B Complex oral capsule: 1 tab(s), Oral, Daily, 0 Refill(s)  Vitamin B6 250 mg oral tablet: 1 tab, Oral, Daily, 0 Refill(s)  Vitamin D3 5000 intl units (125 mcg) oral capsule: 5,000 IntUnit = 1 cap(s), Oral, Daily, with food, 0 Refill(s)  Zofran 8 mg oral tablet: See Instructions, PRN PRN as needed for nausea/vomiting, 1 tab(s) Oral TID for 3 days after chemotherapy and q8hr, # 30, 4 Refill(s)  Zytiga 250 mg oral tablet: 1,000 mg = 4 tab(s), Oral, Daily, 0 Refill(s)  calcium citrate: Oral, Daily, Taking 1,000 mg daily, 0 Refill(s)  glutamine oral powder for reconstitution: 20 gms, Oral, Daily, 0 Refill(s)  loratadine 10 mg oral capsule: See Instructions, 1 cap(s) Oral Daily for 5 days after chemotherapy, 0 Refill(s)  naproxen sodium 220 mg oral capsule: See Instructions, 2 cap(s) Oral BID for 3 days after chemotherapy, 0 Refill(s)  niacin 500 mg oral capsule: 500 mg = 1 cap(s), Oral, Daily, # 60 cap(s), 0 Refill(s)  omeprazole 20 mg oral DR capsule: 20 mg = 1 cap(s), Oral, Daily, 0 Refill(s)  prednisONE 5 mg oral tablet: 5 mg = 1 tab(s), Oral, BID  sucralfate 1 g/10 mL oral  suspension: 1 gm = 10 mL, Oral, QIDACHS, PRN PRN indigestion, # 240 mL, 1 Refill(s)  tamsulosin 0.4 mg oral capsule: 0.4 mg = 1 cap(s), Oral, BID, # 90 cap(s), 0 Refill(s)   Problem list:    All Problems  Reflux / [id number] / Confirmed  Dyslipidemia / [id number] / Confirmed  HTN (hypertension) / [id number] / Confirmed  Reflux / [id number] / Confirmed  Colon polyps - hx of / [id number] / Confirmed  Obesity / 8410989141 / Confirmed  Supraclavicular adenopathy / 244302 / Confirmed  Wellness examination / 327151707 / Confirmed  Falls / 1080597 / Confirmed  Neuropathy / 4868032500 / Confirmed  Small lymphocytic lymphoma / 623459849 / Confirmed  Prostate cancer / 6701540015 / Confirmed  Hodgkin lymphoma / [id number] / Confirmed  Laboratory finding abnormal / 0916383307 / Confirmed  Chemotherapy induced neutropenia / 6142800209 / Confirmed  Non-healing wound of lower extremity / 50906026 / Confirmed  Hypercalcemia / 321780111 / Confirmed  Resolved: Inguinal hernia / [id number]  Resolved: Lymphoma / 43974485  Resolved: Prostate cancer / 4692380650  Resolved: Malignant Hodgkin's lymphoma / [id number],    Active Problems (17)  Chemotherapy induced neutropenia   Colon polyps - hx of   Dyslipidemia   Falls   Hodgkin lymphoma   HTN (hypertension)   Hypercalcemia   Laboratory finding abnormal   Neuropathy   Non-healing wound of lower extremity   Obesity   Prostate cancer   Reflux   Reflux   Small lymphocytic lymphoma   Supraclavicular adenopathy   Wellness examination         Histories   Past Medical History:    Active  Reflux ([id number])  Colon polyps - hx of ([id number])  Resolved  Inguinal hernia ([id number]):  Resolved.  Comments:  1/15/2015 CST 15:17 ALONZO - Jeanna PATE, Luana LUCERO  left  Lymphoma ([id number]):   Resolved.  Prostate cancer ([id number]):  Resolved.  Malignant Hodgkin's lymphoma ([id number]):  Resolved.   Family History:    Atrial fibrillation  Brother  Arthritis  Mother ()  Alzheimer's disease  Mother ()  MI  Father ()  Cataract.  Mother ()  Hypertension.  Mother ()     Procedure history:    Procedure history reviewed.      Physical Examination      No qualifying data available   General:  Alert and oriented, No acute distress.    Eye:  Pupils are equal, round and reactive to light, Extraocular movements are intact, Normal conjunctiva.    HENT:  Normocephalic, Oral mucosa is moist.    Neck:  Supple, bilateral lymphadenopathy in the cervical area which is much improved .    Respiratory:  Lungs are clear to auscultation, Respirations are non-labored, Breath sounds are equal.    Cardiovascular:  Normal rate, Regular rhythm, No edema.    Gastrointestinal:  Soft, Non-tender, Non-distended, Normal bowel sounds.    Integumentary:  Warm, Dry, Pink, Intact.    Neurologic:  Alert, Oriented, Normal sensory, No focal deficits.    Psychiatric:  Cooperative, Appropriate mood & affect.    Lymphatics:  Bilateral cervical, supraclavicular, and axillary lymphadenopathy, all much improved.    Musculoskeletal:  Normal gait.    Cognition and Speech:  Oriented, Speech clear and coherent, Functional cognition intact.    ECOG Performance Scale: 1- Strenuous physical activity restricted; fully ambulatory and able to carry out light work.      Review / Management   Results review:  Lab results   2021 11:05 CST     Sodium Lvl                138 mmol/L                             Potassium Lvl             3.3 mmol/L  LOW                             Chloride                  99 mmol/L                             CO2                       28 mmol/L                             Calcium Lvl               8.9 mg/dL                             Glucose Lvl               92 mg/dL                              BUN                       13.2 mg/dL                             Creatinine                0.68 mg/dL  LOW                             eGFR-AA                   >60  NA                             eGFR-DAYANA                  >60 mL/min/1.73 m2  NA                             Bili Total                0.3 mg/dL                             Bili Direct               0.2 mg/dL                             Bili Indirect             0.10 mg/dL                             AST                       22 unit/L                             ALT                       18 unit/L                             Alk Phos                  88 unit/L                             Total Protein             5.9 gm/dL                             Albumin Lvl               3.2 gm/dL  LOW                             Globulin                  2.7 gm/dL                             A/G Ratio                 1.2 ratio                             LDH                       205 unit/L                             Uric Acid                 4.0 mg/dL                             WBC                       2.2 x10(3)/mcL  LOW                             RBC                       3.44 x10(6)/mcL  LOW                             Hgb                       10.1 gm/dL  LOW                             Hct                       31.6 %  LOW                             Platelet                  355 x10(3)/mcL                             MCV                       91.9 fL                             MCH                       29.4 pg                             MCHC                      32.0 gm/dL  LOW                             RDW                       16.5 %                             MPV                       8.5 fL  LOW                             Abs Neut                  0.58 x10(3)/mcL  LOW                             NEUT%                     26.6 %  NA                             NEUT#                     0.6 x10(3)/mcL  LOW                              LYMPH%                    16.5 %  NA                             LYMPH#                    0.4 x10(3)/mcL  LOW                             MONO%                     39.4 %  NA                             MONO#                     0.9 x10(3)/mcL                             EOS%                      10.6 %  NA                             EOS#                      0.2 x10(3)/mcL                             BASO%                     1.4 %  NA                             BASO#                     0.0 x10(3)/mcL  , Interpretation.       Impression and Plan   Diagnoses:  1.  Small lymphocytic lymphoma, Lugano stage III with a p53, 11q., and 13q. deletion.  2.  Massillon 9/10 prostate cancer in 12/12 core biopsies    Plan:  Patient had a diagnosis of small lymphocytic lymphoma.  He had a Lugano stage III due to lymph nodes above and below the diaphragm.    Given his progressive lymphadenopathy, we started ibrutinib shortly after 5/25/2021 given his p53 mutation.    The patient has a Bertha 9/10 prostate cancer.  MRI showed LAD, this looked more like LAD from his SLL.  He started radiation to the prostate and teodoro basin on 12/14/2020- completed on 2/18/2021.  He has now on abiraterone and prednisone for his prostate cancer through Dr. Papito Callaway with radiation oncology, started on 5/7/2021.    PET/CT does show progressing disease with Deauville 5.  Brightest SUV is in his left axillary LN.  Patient underwent LN biopsy, pathology shows transformation to Hodgkin lymphoma.     Started ABVD, cycle 1 day 1 on 9/8/2021.  Received cycle 2 day 15 on 10/20/2021, however, due to PET/CT scanner being down, unable to get PET after 2 cycles.      We treated with cycle 3 day 1 of ABVD.  PET/[facility] scan at [facility] on 11/11/2021 did show overall improving disease.  Right axillary lymph node does have a Deauville score of 4, however this is responding.  All other Deauville scores were 3.  We will continue with ABVD through cycle 4 and repeat  another PET/CT scan.     Hep C Ab came back reactive- rechecked HCV RNA which was negative indicating no active infection    Will cancel referral sent for excisional lymph node biopsy     Okay to proceed with treatment today.    PET/CT scan prior to appointment with [facility] in 4 weeks for TD visit and clinical examination to review scan results.    Labs: CBC, CMP, LDH, Uric Acid    He knows to call with any new questions/concerns/worsening symptoms or fever    All questions were answered to the best of my ability.   Pedro Sol II, MD         Professional Services   I, Kathleen Green LPN, acted solely as a scribe for and in the presence of, Dr. Pedro Sol who performed the service.

## 2022-04-30 NOTE — OP NOTE
DATE OF SURGERY:    05/05/2021    SURGEON:  Claudio Corral MD    PREOPERATIVE DIAGNOSIS:  Carpal tunnel syndrome, left hand.    POSTOPERATIVE DIAGNOSIS:  Carpal tunnel syndrome, left hand.    PROCEDURE:  Open carpal tunnel release, left hand.    INDICATION FOR PROCEDURE:  Konstantin Arroyo is a 65-year-old male with longstanding history of carpal tunnel syndrome, bilateral hands.  Released his right hand several months ago.  He presents for his left.    ANESTHESIA:  General.    COMPLICATIONS:  None.    PROCEDURE IN DETAIL:  The patient was placed under LMA anesthesia, prepped and draped in the usual sterile fashion.  Esmarch was used to exsanguinate the left upper extremity and tourniquet was inflated to 250 mmHg.  I first began by making a 2.5 cm incision in the volar aspect of the palm using 15 blade scalpel.  Retractors were placed.  The palmaris brevis was divided, and the transverse carpal ligament was released using 15 blade scalpel and then tenotomy scissors used to open its entirety under direct visualization.  After this was completed, the median nerve was relaxed and was able to have space under the transverse carpal ligament.  We then closed using 2 interrupted 3-0 nylon.  Sterile dressing was applied.  Tourniquet released.  Fingers were pink at the end of the case.  I was scrubbed and present for the entire procedure.        ______________________________  Claudio Corral MD    BF/UA  DD:  05/05/2021  Time:  08:38AM  DT:  05/05/2021  Time:  10:26AM  Job #:  [id number]

## 2022-04-30 NOTE — OP NOTE
Hugh Neri MD      Patient:   Konstantin Arroyo             MRN: [id number]            FIN: [id number]               Age:   64 years     Sex:  Male     :  1955   Associated Diagnoses:   None   Author:   Hugh Neri MD      Date: 2020    Surgeon: Hugh Neri MD    Assistant: HAYDER Alves    Preoperative Diagnosis: Supraclavicular and cervical lymphadenopathy    Postoperative Diagnosis: Same    Procedure: Ultrasound-guided excisional biopsy of left supraclavicular lymph node    Anesthesia: GETA    Estimated Blood Loss: Less than 15 cc    Intra-operative Findings: Numerous enlarged lymph nodes were identified under ultrasound the left supraclavicular fossa.  A superficial node away from major vascular and neural structures was selected using ultrasound guidance, a representative node was completely excised and sent fresh for histopathology and flow cytometry    Procedure in Detail: After informed consent was obtained the patient was brought to the operating room placed in the supine position.  General endotracheal anesthesia was administered without difficulty.  The patient's upper neck was prepped and draped sterile fashion.  I performed a focused soft tissue ultrasound of the left supraclavicular fossa and identified numerous enlarged abnormal appearing lymph nodes with loss of fatty hilum and round anechoic appearance.  A superficial node away from major vascular and neural structures was selected using ultrasound.  An incision was made over this area carried down through the subcutaneous tissues and platysma.  The node was then dissected out using gentle blunt dissection and electrocautery dissection.  The node was removed intact and sent fresh for histopathology and flow cytometry.  Meticulous hemostasis was achieved the wound was closed in multiple layers with absorbable suture sterile dressings were applied.

## 2022-04-30 NOTE — OP NOTE
Hugh Neri MD      Patient:   Jeter, Karl Duane            MRN: [id number]            FIN: [id number]               Age:   66 years     Sex:  Male     :  1955   Associated Diagnoses:   None   Author:   Hugh Neri MD      Date:  2021    Surgeon: Hugh Neri MD    Assistant:     Preoperative Diagnosis: History of lymphoma, left axillary lymphadenopathy    Postoperative Diagnosis: Same    Procedure: Ultrasound-guided excisional biopsy deep left axillary lymph node    Anesthesia: GETA    Estimated Blood Loss: Less than 25 cc    Intra-operative Findings: Representative enlarged lymph node was targeted with ultrasound and completely excised it was sent fresh for histopathology and flow cytometry    Procedure in Detail: After informed consent was obtained the patient was brought to the operating placed by position.  General endotracheal anesthesia was administered without difficulty.  The patient's left axilla was prepped and draped in sterile fashion.  I performed a focused left axillary ultrasound identifying several enlarged abnormal appearing lymph nodes.  Representative deep axillary lymph node was targeted incision was made over this area after infiltration local anesthesia carried down through the subcutaneous tissues and through the axillary fascia into the deep axillary tissue.  The large node was completely excised intact and sent fresh for histopathology and flow cytometry, entering lymphatics and vessels were divided between clips.  Meticulous hemostasis was achieved the wound was irrigated and closed in multiple layers with absorbable suture sterile dressings were applied.

## 2022-05-14 NOTE — PROGRESS NOTES
Patient:   Jeter, Karl Duane            MRN: [id number]            FIN: [id number]               Age:   66 years     Sex:  Male     :  1955   Associated Diagnoses:   None   Author:   Daniella Waldron NP        Referring Physician:  Hugh Neri MD  PCP:  Tory Batista MD  Urology: Pee Knox MD  Radiation Oncology: Tommy Callaway MD      Visit Information     Problem List:   1.  Small lymphocytic lymphoma, Lugano stage III  2.  [place] 9/10 prostate cancer in  core biopsies  3.  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    Treatment Plan:  1.  Due to transformation of SLL to Hodgkin lymphoma, we started ABVD--- Cycle #1 on 2021.  2.  Prostate cancer per urology and radiation oncology.    Treatment History:  SLL -- progressive lymphadenopathy on repeat PET 3/4/2021-- Started treatment with Imbruvica 420mg PO daily shortly after 2021.    Diagnosis/Treatment/HPI:   66-year-old male who noticed a lump in his neck in 2020.  He presented to his PCP who ordered an ultrasound of the neck.  Ultrasound on 2020 showed prominent lymphadenopathy involving the supraclavicular regions bilaterally and bilateral cervical chains.  This is concerning for lymphoma or metastatic disease.    He was seen by Dr. Hugh Neri on 2020 and plan was made for excisional biopsy of the lymph node.  Excisional biopsy was done on 2020 and pathology revealed small lymphocytic lymphoma, CD5 positive, CD20 positive, and CD23 positive.  Cyclin D1 was negative.  FISH prognostic panel on the lymph node biopsy revealed a p53 deletion (17p13), a mono allelic deletion of [id number] (13q14), and a deletion of SAMMI (11q22.3).  He was referred to medical oncology for further recommendations.    He presented initially to medical oncology on 2020.  CT scans of the neck, chest, abdomen, and pelvis were ordered at that time.  CT of the neck  showed bilateral upper and lower cervical adenopathy with a right upper cervical lymph node measuring 1.6 x 1.7 cm, a right lower cervical lymph node measuring 1.6 x 2.3 cm, a left upper cervical lymph node measuring 1.5 x 1.8 cm, and a right lower cervical lymph node measuring 2.0 x 2.0 cm.  CT scan of the chest, abdomen, and pelvis revealed lymphadenopathy in the chest, abdomen, and pelvis with mild hepatosplenomegaly.  A right axillary lymph node measured 1.7 x 2.3 cm.  A left axillary lymph node measured 1.6 x 2.8 cm.  A right upper internal mammary lymph node measured 1.6 x 2.0 cm.  The liver was mildly enlarged measuring 17 cm, the spleen was mildly enlarged measuring 14 cm.  There are small upper abdominal and retroperitoneal lymph nodes, and upper retrocaval lymph node measured 1.0 x 2.0 cm and a lower aortocaval lymph node measured 0.9 x 1.5 cm.  There are enlarged bilateral pelvic lymph nodes, a right common iliac lymph node measuring 1.2 x 1.5 cm in the right pelvic sidewall lymph node measuring 1.3 x 2.0 cm.  There was a left external iliac lymph node measuring 1.2 x 2.7 cm.    Of note, due to an elevated PSA and total protein on 6/24/2020, the patient had protein electrophoresis ordered on his serum which was done on 6/25/2020 and revealed an M spike of 0.7.  Corresponding immunofixation showed an unremarkable pattern with no evidence of monoclonal protein apparent.  Patient was seen by Dr. Pee Knox with urology and underwent a prostate biopsy on 9/14/2020.  Pathology revealed adenocarcinoma, Quentin score 9/10 in 12/12 cores.  Per patient report, he had MRI and bone scan at [facility], bone scan was negative and MRI showed pelvic lymphadenopathy.    Repeat CT scan of the neck on 10/6/2020 showed similar count of neck lymphadenopathy with interval mild increase in size of several of the lymph nodes.  A right upper neck lymph node measured 1.8 x 1.9 cm, previously 1.6 x 1.7 cm.  A right lower neck lymph  node measured 2.7 x 2.5 cm, previously 1.6 x 2.3 cm.  A right lower neck lymph node measured 2.5 x 1.8 cm, previously 2.0 x 2.0 cm.  A left upper neck measured 1.5 x 1.8 cm with no change.  A left lower neck measured 3.2 x 2.2 cm, previously 2.7 x 2.2 cm.  CT of the chest, abdomen, and pelvis on 10/6/2020 showed adenopathy in the mediastinum, both axilla, aorta, and iliac regions that are all stable from previous study.  Hepatosplenomegaly stable from previous study.  No new lesions noted.    Patient started on radiation for prostate cancer on 12/14/2020.      PET/CT scan on 3/4/2021 showed hypermetabolic lymphadenopathy in the neck, chest, and abdomen, some of which have increased in size most significantly in the chest.  There was interval decrease in size of pelvic lymphadenopathy.  He started on abiraterone and prednisone through Dr. Papito Callaway with radiation oncology on 5/7/2021.  Ibrutinib ordered on 5/25/2021 and started shortly after.    PET/CT scan on 8/4/2021 showed disease progression most notable in the chest and abdomen with Deauville score of 5.  Left axillary lymph node biopsy on 8/18/2021 showed transformation SLL in the hospital lymphoma.      Baseline ECHO on 9/7/2021 showed EF of 60-65%.  Baseline PFT on 9/7/2021 normal.  We started ABVD on 9/8/2021.    PET/CT scan at [facility] on 11/11/2021 showed overall response and improving lymphoproliferative disease with a stable dominant sclerotic lesion at T11.  Deauville scores of 3 with one area of 4 in the right axilla (this is however improving).    Repeat PET/CT scan at [facility] on 12/27/2021 showed continued improvement in lymphoproliferative disease with Deauville score of 3.  Stable dominant sclerotic lesion in the T11 vertebral body.         Dx & staging      Chief Complaint    Follow-up        2/23/2022 8:24 CST       Patient stated that he has been feeling tired/no energy to do anything. patient's appetite has been poor.    2/23/2022 8:19 CST        Patient stated that he has been feeling tired/no energy to do anything. patient's appetite has been poor.        Interval History   Patient here for scheduled follow-up visit.  He completed cycle 6 of ABVD on 2/10/2022.  Today he reports that he is not feeling well.  He reports fatigue, weakness, decreased appetite, dizziness with position change.  His diet at this time mainly consist of Ensure and fluids.  He drinks approximately 3-4 Ensure per day. He denies fevers, chills or other signs of infection. Denies HA, SOB, CP, N/V/C/D. Denies melena or BRBPR. No new pain.  He does report some neuropathy in his hands and feet that is constant but denies associated pain.        PMHx:  None     PSHx:   1.  Bone graft 1976  2.  Hernia repair 2015  3.  Knee arthroscopic meniscus repair 2019  4.  Carpal tunnel 2019    Social Hx:   The patient is single, works as a , denies current tobacco use.  He previously smoked 1 pack a day for 3 years and quit in 1972.  He drinks approximately 6 beers a week.    Family Hx:   He has a family history of arthritis and his father had lung disease (interstitial fibrosis).         Review of Systems   14 point review of systems done in full with pertinent positives as described above  Remainder of review systems done in full and unremarkable.   Constitutional:  Fatigue, No fever, No chills.    Eye:  No double vision, No visual disturbances.    Ear/Nose/Mouth/Throat:  Neck pain secondary to lymphadenopathy., No nasal congestion, No sore throat.    Respiratory:  No shortness of breath, No cough.    Cardiovascular:  No chest pain.    Gastrointestinal:  No nausea, No vomiting.    Genitourinary:  No dysuria, No hematuria.    Hematology/Lymphatics:  No bruising tendency, No bleeding tendency.    Musculoskeletal:  Neck pain.    Integumentary:  No rash.    Neurologic:  Numbness, Tingling.    Psychiatric:  No anxiety.       Health Status   Allergies:    Allergic Reactions (Selected)  No Known  Allergies  No Known Medication Allergies,    Allergies (2) Active Reaction  No Known Allergies None Documented  No Known Medication Allergies None Documented     Current medications:  (Selected)   Outpatient Medications  Ordered  Toradol: 30 mg, form: Injection, IV Push, Once, first dose 08/18/21 12:00:00 CDT, stop date 08/18/21 12:00:00 CDT, if not given in OR (Do NOT use if renal disease, recent vascular surgery, history of PUD/GI bleed, ASA sensitivity, NSAID induced asthma or aller...  Future  Benadryl (for IVPB): 25 mg, form: Infusion, IV Piggyback, Once-chemo, Infuse over: 20 minute(s), *Est. first dose 01/27/22 14:00:00 CST, *Est. stop date 01/27/22 14:00:00 CST, Future Order, Pre-Med for transfusion, Days 1  CCA Normal Saline (0.9% NS) - 500 mL: 500, form: Infusion, IV Piggyback, Once-chemo, Infuse over: 1 hr, *Est. first dose 02/23/22 9:10:00 CST, *Est. stop date 02/23/22 9:10:00 CST, Future Order, Days 1  Tylenol: 650 mg, form: Tab, Oral, Once-chemo, *Est. first dose 01/27/22 14:00:00 CST, *Est. stop date 01/27/22 14:00:00 CST, Routine, Pre-Med for Transfusion, Days 1, Future Order  Tylenol: 650 mg, form: Tab, Oral, Once-chemo, *Est. first dose 02/23/22 8:49:00 CST, *Est. stop date 02/23/22 8:49:00 CST, Routine, Pre-Med for Transfusion, Days 1, Future Order  Pending Complete  midazolam: 2 mg, form: Injection, IV Push, q5min, Order duration: 2 dose(s), first dose 04/30/20 9:30:00 CDT, stop date 04/30/20 9:39:00 CDT, (up to 5 mg for moderate anxiety)  Prescriptions  Prescribed  Linzess 72 mcg oral capsule: 72 mcg = 1 cap(s), Oral, Daily, do not crush or chew, # 90 cap(s), 6 Refill(s), Pharmacy: [facility], 184, cm, Height/Length Dosing, 11/02/21 10:17:00 CDT, 84.8, kg, Weight Dosing, 11/02/21 10:17:00 CDT  cyproheptadine 4 mg oral tablet: See Instructions, Take 2 tab(s) PO approximatly 30 minutes prior to meals to help with appetite stimulation, # 90 tab(s), 0 Refill(s), Pharmacy: [facility], 184, cm, Height/Length Dosing, 01/26/22 13:28:00 CST, 74.1, kg, Weight Dosing, 01/26...  promethazine 25 mg oral tablet: 25 mg = 1 tab(s), Oral, q4hr, PRN PRN as needed for nausea/vomiting, # 90 tab(s), 0 Refill(s), Pharmacy: [facility], 184, cm, Height/Length Dosing, 10/04/21 11:04:00 CDT, 84.9, kg, Weight Dosing, 10/04/21 11:04:00 CDT  Documented Medications  Documented  MiraLax: 17 gm, Oral, Daily, 0 Refill(s)  Trulance 3 mg oral tablet: 3 mg = 1 tab(s), Oral, Daily  Vitamin B Complex oral capsule: 1 tab(s), Oral, Daily, 0 Refill(s)  Vitamin B6 250 mg oral tablet: 1 tab, Oral, Daily, 0 Refill(s)  Vitamin D3 5000 intl units (125 mcg) oral capsule: 5,000 IntUnit = 1 cap(s), Oral, Daily, with food, 0 Refill(s)  Zofran 8 mg oral tablet: See Instructions, PRN PRN as needed for nausea/vomiting, 1 tab(s) Oral TID for 3 days after chemotherapy and q8hr, # 30, 4 Refill(s)  Zytiga 250 mg oral tablet: 1,000 mg = 4 tab(s), Oral, Daily, 0 Refill(s)  calcium citrate: Oral, Daily, Taking 1,000 mg daily, 0 Refill(s)  glutamine oral powder for reconstitution: 20 gms, Oral, Daily, 0 Refill(s)  levofloxacin 750 mg oral tablet: 750 mg = 1 tab(s), Oral, Daily  loratadine 10 mg oral capsule: See Instructions, 1 cap(s) Oral Daily for 5 days after chemotherapy, 0 Refill(s)  naproxen sodium 220 mg oral capsule: See Instructions, 2 cap(s) Oral BID for 3 days after chemotherapy, 0 Refill(s)  niacin 500 mg oral capsule: 500 mg = 1 cap(s), Oral, Daily, # 60 cap(s), 0 Refill(s)  omeprazole 20 mg oral DR capsule: 20 mg = 1 cap(s), Oral, Daily, 0 Refill(s)  prednisONE 5 mg oral tablet: 5 mg = 1 tab(s), Oral, BID  sucralfate 1 g/10 mL oral suspension: 1 gm = 10 mL, Oral, QIDACHS, PRN PRN indigestion, # 240 mL, 1 Refill(s)  tamsulosin 0.4 mg oral capsule: 0.4 mg = 1 cap(s), Oral, BID, # 90 cap(s), 0 Refill(s)   Problem list:    All Problems  Symptomatic anemia / SNOMED CT 801647420 / Confirmed  Colon polyps - hx of / SNOMED CT  [id number] / Confirmed  Dehydration / SNOMED CT 95888737 / Confirmed  Dyslipidemia / SNOMED CT 9000620693 / Confirmed  Falls / SNOMED CT 3423720 / Confirmed  Hodgkin lymphoma / SNOMED CT 075528817 / Confirmed  Hodgkin lymphoma of lymph nodes of multiple sites / SNOMED CT 500204157 / Confirmed  HTN (hypertension) / SNOMED CT [id number] / Confirmed  Hypercalcemia / SNOMED CT 966761837 / Confirmed  Laboratory finding abnormal / SNOMED CT 6737421238 / Confirmed  reviewed Hep C ab labs, reactive  Small lymphocytic lymphoma / SNOMED CT 611638520 / Confirmed  dx 1 yr ago  Prostate cancer / SNOMED CT 7256440835 / Confirmed  had radiation tx- will start chemo after this surgery  Neuropathy / SNOMED CT 9374708374 / Confirmed  Chemotherapy induced neutropenia / SNOMED CT 3047515999 / Confirmed  Obesity / SNOMED CT 2980833946 / Confirmed  Non-healing wound of lower extremity / SNOMED CT 41019754 / Confirmed  Wellness examination / SNOMED CT 448215154 / Confirmed  Reflux / SNOMED CT [id number] / Confirmed  Reflux / SNOMED CT [id number] / Confirmed  Supraclavicular adenopathy / SNOMED CT 920432 / Confirmed  Resolved: Inguinal hernia / SNOMED CT [id number]  left  Resolved: Lymphoma / SNOMED CT 34905138  Resolved: Malignant Hodgkin's lymphoma / SNOMED CT 1203106085  Resolved: Prostate cancer / SNOMED CT 3164709815,    Active Problems (20)  Chemotherapy induced neutropenia   Colon polyps - hx of   Dehydration   Dyslipidemia   Falls   Hodgkin lymphoma   Hodgkin lymphoma of lymph nodes of multiple sites   HTN (hypertension)   Hypercalcemia   Laboratory finding abnormal   Neuropathy   Non-healing wound of lower extremity   Obesity   Prostate cancer   Reflux   Reflux   Small lymphocytic lymphoma   Supraclavicular adenopathy   Symptomatic anemia   Wellness examination         Physical Examination   Vital Signs    2/23/2022 8:24 CST       Temperature Oral          37.3 DegC                             Temperature Oral (calculated)             99.14 DegF                             Peripheral Pulse Rate     100 bpm                             Respiratory Rate          18 br/min                             SpO2                      98 %                             Oxygen Therapy            Room air                             Systolic Blood Pressure   90 mmHg                             Diastolic Blood Pressure  63 mmHg                             Blood Pressure Location   Left arm                             Manual Cuff BP            No                             O2 SAT at rest            98 %     Measurements from flowsheet : Measurements   2/23/2022 8:24 CST       Weight Dosing             71.000 kg                             Weight Measured           71 kg                             Weight Measured and Calculated in Lbs     156.53 lb                             Height/Length Dosing      184.00 cm                             Height/Length Measured    184 cm                             BSA Measured              1.9 m2                             Body Mass Index Measured  20.97 kg/m2        Vital Signs (last 24 hrs)_____  Last Charted___________  Temp Oral     37.3 DegC  (FEB 23 08:24)  Heart Rate Peripheral   100 bpm  (FEB 23 08:24)  Resp Rate         18 br/min  (FEB 23 08:24)  SBP      90 mmHg  (FEB 23 08:24)  DBP      63 mmHg  (FEB 23 08:24)  SpO2      98 %  (FEB 23 08:24)  Weight      71 kg  (FEB 23 08:24)  Height      184 cm  (FEB 23 08:24)  BMI      20.97  (FEB 23 08:24)     General:  Alert and oriented, No acute distress.    Eye:  Extraocular movements are intact, Normal conjunctiva.         Sclera: Not icteric.    HENT:  Normocephalic, Oral mucosa is moist.    Neck:  Supple, Non-tender, No lymphadenopathy.    Respiratory:  Lungs are clear to auscultation, Respirations are non-labored, Breath sounds are equal,  Symmetrical chest wall expansion.    Cardiovascular:  Normal rate, Regular rhythm, No edema.    Gastrointestinal:  Soft, Non-tender, Non-distended, Normal bowel sounds.    Integumentary:  Warm, Dry, Pink, Intact.    Neurologic:  Alert, Oriented, No focal deficits.    Psychiatric:  Cooperative, Appropriate mood & affect.    Musculoskeletal:  Generalized weakness, slow gait.    Cognition and Speech:  Oriented, Speech clear and coherent, Functional cognition intact.    ECOG Performance Scale: 2 - Capable of all self-care but unable to carry out any work activities. Up and about greater than 50 percent of waking hours.      Review / Management   Results review:  Lab results   2/23/2022 8:01 CST       WBC                       1.3 x10(3)/mcL  CRIT                             RBC                       2.81 x10(6)/mcL  LOW                             Hgb                       7.9 gm/dL  LOW                             Hct                       25.7 %  LOW                             Platelet                  399 x10(3)/mcL                             MCV                       91.5 fL                             MCH                       28.1 pg                             MCHC                      30.7 gm/dL  LOW                             RDW                       20.4 %  HI                             MPV                       8.7 fL  LOW                             Abs Neut                  0.16 x10(3)/mcL  LOW                             NEUT%                     12.1 %  NA                             NEUT#                     0.2 x10(3)/mcL  LOW                             LYMPH%                    25.0 %  NA                             LYMPH#                    0.3 x10(3)/mcL  LOW                             MONO%                     43.2 %  NA                             MONO#                     0.6 x10(3)/mcL                             EOS%                      13.6 %  NA                             EOS#                       0.2 x10(3)/mcL                             BASO%                     2.3 %  NA                             BASO#                     0.0 x10(3)/mcL  , Interpretation.       Impression and Plan   Diagnoses:  1.  Small lymphocytic lymphoma, [place] stage III with a p53, 11q., and 13q. deletion.  2.  [place] 9/10 prostate cancer in 12/12 core biopsies  3.  Symptomatic anemia    Plan:  Patient had a diagnosis of small lymphocytic lymphoma.  He had a [place] stage III due to lymph nodes above and below the diaphragm.    Given his progressive lymphadenopathy, we started ibrutinib shortly after 5/25/2021 given his p53 mutation.    The patient has a [place] 9/10 prostate cancer.  MRI showed LAD, this looked more like LAD from his SLL.  He started radiation to the prostate and teodoro basin on 12/14/2020- completed on 2/18/2021.  He has now on abiraterone and prednisone for his prostate cancer through Dr. Papito Callaway with radiation oncology, started on 5/7/2021.    PET/CT does show progressing disease with Deauville 5.  Brightest SUV is in his left axillary LN.  Patient underwent LN biopsy, pathology shows transformation to Hodgkin lymphoma.     Started ABVD, cycle 1 day 1 on 9/8/2021.  Received cycle 2 day 15 on 10/20/2021, however, due to PET/CT scanner being down, unable to get PET after 2 cycles.      We treated with cycle 3 day 1 of ABVD.  [facility] scan at [facility] on 11/11/2021 did show overall improving disease.  Right axillary lymph node does have a Deauville score of 4, however this is responding.  All other Deauville scores were 3.  We continue with ABVD through cycle 4, repeat PET/CT scan after cycle 4 showed continued improvement with Deauville score of 3.    We will treat with cycle 5 and 6 with AVD and drop bleomycin--completed 2/10/22    Will transfuse 1 unit PRBC today s/t symptomatic anemia, will also give some additional IV fluids    Hep C Ab came back reactive- rechecked HCV RNA which was  negative indicating no active infection    RTC in 2 weeks for PET results     PET/CT now scheduled for next week    Labs: CBC, CMP, LDH, sed rate    He knows to call with any new questions/concerns/worsening symptoms or fever    All questions were answered to the best of my ability.   Daniella Waldron, FNP-C

## 2022-05-24 PROBLEM — E83.52 HYPERCALCEMIA: Status: ACTIVE | Noted: 2022-05-24

## 2022-05-24 PROBLEM — I10 HYPERTENSION: Status: ACTIVE | Noted: 2022-05-24

## 2022-05-24 PROBLEM — R59.0 SUPRACLAVICULAR LYMPHADENOPATHY: Status: ACTIVE | Noted: 2022-05-24

## 2022-05-24 PROBLEM — S81.809A OPEN WOUND OF LOWER EXTREMITY: Status: ACTIVE | Noted: 2022-05-24
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
## 2022-05-24 PROBLEM — C83.00: Status: ACTIVE | Noted: 2022-05-24

## 2022-05-24 PROBLEM — K63.5 POLYP OF COLON: Status: ACTIVE | Noted: 2022-05-24

## 2022-05-24 PROBLEM — C61 MALIGNANT NEOPLASM OF PROSTATE: Status: ACTIVE | Noted: 2022-05-24

## 2022-05-24 PROBLEM — K21.9 GASTROESOPHAGEAL REFLUX DISEASE: Status: ACTIVE | Noted: 2022-05-24

## 2022-05-24 PROBLEM — G62.9 NEUROPATHY: Status: ACTIVE | Noted: 2022-05-24

## 2022-05-24 PROBLEM — K40.90 INGUINAL HERNIA: Status: ACTIVE | Noted: 2022-05-24

## 2022-05-24 PROBLEM — Z00.00 WELLNESS EXAMINATION: Status: ACTIVE | Noted: 2022-05-24

## 2022-05-24 PROBLEM — T45.1X5A CHEMOTHERAPY-INDUCED NEUTROPENIA: Status: ACTIVE | Noted: 2022-05-24

## 2022-05-24 PROBLEM — D70.1 CHEMOTHERAPY-INDUCED NEUTROPENIA: Status: ACTIVE | Noted: 2022-05-24

## 2022-05-24 PROBLEM — E78.5 DYSLIPIDEMIA: Status: ACTIVE | Noted: 2022-05-24

## 2022-05-24 PROBLEM — E66.9 OBESITY: Status: ACTIVE | Noted: 2022-05-24

## 2022-05-25 PROBLEM — E86.0 DEHYDRATION: Status: ACTIVE | Noted: 2022-05-25

## 2022-05-25 PROBLEM — D64.9 ANEMIA: Status: ACTIVE | Noted: 2022-05-25

## 2022-06-03 DIAGNOSIS — C83.00: Primary | ICD-10-CM

## 2022-06-03 DIAGNOSIS — C61 MALIGNANT NEOPLASM OF PROSTATE: ICD-10-CM

## 2022-06-13 PROBLEM — C81.90 HODGKIN LYMPHOMA: Status: ACTIVE | Noted: 2022-06-13

## 2022-06-15 ENCOUNTER — OFFICE VISIT (OUTPATIENT)
Dept: HEMATOLOGY/ONCOLOGY | Facility: CLINIC | Age: 67
End: 2022-06-15
Payer: COMMERCIAL

## 2022-06-15 ENCOUNTER — LAB VISIT (OUTPATIENT)
Dept: LAB | Facility: HOSPITAL | Age: 67
End: 2022-06-15
Attending: INTERNAL MEDICINE
Payer: COMMERCIAL

## 2022-06-15 VITALS
WEIGHT: 188.69 LBS | RESPIRATION RATE: 18 BRPM | SYSTOLIC BLOOD PRESSURE: 138 MMHG | HEART RATE: 91 BPM | HEIGHT: 72 IN | BODY MASS INDEX: 25.56 KG/M2 | DIASTOLIC BLOOD PRESSURE: 87 MMHG | OXYGEN SATURATION: 97 % | TEMPERATURE: 99 F

## 2022-06-15 DIAGNOSIS — C83.00: ICD-10-CM

## 2022-06-15 DIAGNOSIS — C61 MALIGNANT NEOPLASM OF PROSTATE: ICD-10-CM

## 2022-06-15 DIAGNOSIS — C83.00: Primary | ICD-10-CM

## 2022-06-15 LAB
ALBUMIN SERPL-MCNC: 3.8 GM/DL (ref 3.4–4.8)
ALBUMIN/GLOB SERPL: 1.4 RATIO (ref 1.1–2)
ALP SERPL-CCNC: 74 UNIT/L (ref 40–150)
ALT SERPL-CCNC: 21 UNIT/L (ref 0–55)
AST SERPL-CCNC: 23 UNIT/L (ref 5–34)
BASOPHILS # BLD AUTO: 0.01 X10(3)/MCL (ref 0–0.2)
BASOPHILS NFR BLD AUTO: 0.2 %
BILIRUBIN DIRECT+TOT PNL SERPL-MCNC: 0.5 MG/DL
BUN SERPL-MCNC: 11 MG/DL (ref 8.4–25.7)
CALCIUM SERPL-MCNC: 9.7 MG/DL (ref 8.8–10)
CHLORIDE SERPL-SCNC: 105 MMOL/L (ref 98–107)
CO2 SERPL-SCNC: 26 MMOL/L (ref 23–31)
CREAT SERPL-MCNC: 0.81 MG/DL (ref 0.73–1.18)
EOSINOPHIL # BLD AUTO: 0.25 X10(3)/MCL (ref 0–0.9)
EOSINOPHIL NFR BLD AUTO: 4.1 %
ERYTHROCYTE [DISTWIDTH] IN BLOOD BY AUTOMATED COUNT: 14 % (ref 11.5–17)
ERYTHROCYTE [SEDIMENTATION RATE] IN BLOOD: 21 MM/HR (ref 0–15)
FREE/TOTAL PSA (OHS): 18.6 %
GLOBULIN SER-MCNC: 2.8 GM/DL (ref 2.4–3.5)
GLUCOSE SERPL-MCNC: 137 MG/DL (ref 82–115)
HCT VFR BLD AUTO: 39.2 % (ref 42–52)
HGB BLD-MCNC: 12.4 GM/DL (ref 14–18)
IMM GRANULOCYTES # BLD AUTO: 0.02 X10(3)/MCL (ref 0–0.02)
IMM GRANULOCYTES NFR BLD AUTO: 0.3 % (ref 0–0.43)
LDH SERPL-CCNC: 185 U/L (ref 125–220)
LYMPHOCYTES # BLD AUTO: 0.96 X10(3)/MCL (ref 0.6–4.6)
LYMPHOCYTES NFR BLD AUTO: 15.7 %
MCH RBC QN AUTO: 28.3 PG (ref 27–31)
MCHC RBC AUTO-ENTMCNC: 31.6 MG/DL (ref 33–36)
MCV RBC AUTO: 89.5 FL (ref 80–94)
MONOCYTES # BLD AUTO: 0.38 X10(3)/MCL (ref 0.1–1.3)
MONOCYTES NFR BLD AUTO: 6.2 %
NEUTROPHILS # BLD AUTO: 4.5 X10(3)/MCL (ref 2.1–9.2)
NEUTROPHILS NFR BLD AUTO: 73.5 %
PLATELET # BLD AUTO: 207 X10(3)/MCL (ref 130–400)
PMV BLD AUTO: 8.1 FL (ref 9.4–12.4)
POTASSIUM SERPL-SCNC: 3.8 MMOL/L (ref 3.5–5.1)
PROT SERPL-MCNC: 6.6 GM/DL (ref 5.8–7.6)
PSA FREE MFR SERPL: 19 %
PSA FREE SERPL-MCNC: 0.54 NG/ML
PSA SERPL-MCNC: 2.91 NG/ML
RBC # BLD AUTO: 4.38 X10(6)/MCL (ref 4.7–6.1)
SODIUM SERPL-SCNC: 140 MMOL/L (ref 136–145)
WBC # SPEC AUTO: 6.1 X10(3)/MCL (ref 4.5–11.5)

## 2022-06-15 PROCEDURE — 1160F PR REVIEW ALL MEDS BY PRESCRIBER/CLIN PHARMACIST DOCUMENTED: ICD-10-PCS | Mod: CPTII,S$GLB,, | Performed by: INTERNAL MEDICINE

## 2022-06-15 PROCEDURE — 3288F FALL RISK ASSESSMENT DOCD: CPT | Mod: CPTII,S$GLB,, | Performed by: INTERNAL MEDICINE

## 2022-06-15 PROCEDURE — 99214 PR OFFICE/OUTPT VISIT, EST, LEVL IV, 30-39 MIN: ICD-10-PCS | Mod: S$GLB,,, | Performed by: INTERNAL MEDICINE

## 2022-06-15 PROCEDURE — 3075F SYST BP GE 130 - 139MM HG: CPT | Mod: CPTII,S$GLB,, | Performed by: INTERNAL MEDICINE

## 2022-06-15 PROCEDURE — 85025 COMPLETE CBC W/AUTO DIFF WBC: CPT

## 2022-06-15 PROCEDURE — 84154 ASSAY OF PSA FREE: CPT

## 2022-06-15 PROCEDURE — 1101F PT FALLS ASSESS-DOCD LE1/YR: CPT | Mod: CPTII,S$GLB,, | Performed by: INTERNAL MEDICINE

## 2022-06-15 PROCEDURE — 83615 LACTATE (LD) (LDH) ENZYME: CPT

## 2022-06-15 PROCEDURE — 1159F PR MEDICATION LIST DOCUMENTED IN MEDICAL RECORD: ICD-10-PCS | Mod: CPTII,S$GLB,, | Performed by: INTERNAL MEDICINE

## 2022-06-15 PROCEDURE — 99214 OFFICE O/P EST MOD 30 MIN: CPT | Mod: S$GLB,,, | Performed by: INTERNAL MEDICINE

## 2022-06-15 PROCEDURE — 3008F PR BODY MASS INDEX (BMI) DOCUMENTED: ICD-10-PCS | Mod: CPTII,S$GLB,, | Performed by: INTERNAL MEDICINE

## 2022-06-15 PROCEDURE — 84153 ASSAY OF PSA TOTAL: CPT

## 2022-06-15 PROCEDURE — 3008F BODY MASS INDEX DOCD: CPT | Mod: CPTII,S$GLB,, | Performed by: INTERNAL MEDICINE

## 2022-06-15 PROCEDURE — 3075F PR MOST RECENT SYSTOLIC BLOOD PRESS GE 130-139MM HG: ICD-10-PCS | Mod: CPTII,S$GLB,, | Performed by: INTERNAL MEDICINE

## 2022-06-15 PROCEDURE — 3079F DIAST BP 80-89 MM HG: CPT | Mod: CPTII,S$GLB,, | Performed by: INTERNAL MEDICINE

## 2022-06-15 PROCEDURE — 80053 COMPREHEN METABOLIC PANEL: CPT

## 2022-06-15 PROCEDURE — 36415 COLL VENOUS BLD VENIPUNCTURE: CPT

## 2022-06-15 PROCEDURE — 1126F AMNT PAIN NOTED NONE PRSNT: CPT | Mod: CPTII,S$GLB,, | Performed by: INTERNAL MEDICINE

## 2022-06-15 PROCEDURE — 1160F RVW MEDS BY RX/DR IN RCRD: CPT | Mod: CPTII,S$GLB,, | Performed by: INTERNAL MEDICINE

## 2022-06-15 PROCEDURE — 99999 PR PBB SHADOW E&M-EST. PATIENT-LVL V: ICD-10-PCS | Mod: PBBFAC,,, | Performed by: INTERNAL MEDICINE

## 2022-06-15 PROCEDURE — 3288F PR FALLS RISK ASSESSMENT DOCUMENTED: ICD-10-PCS | Mod: CPTII,S$GLB,, | Performed by: INTERNAL MEDICINE

## 2022-06-15 PROCEDURE — 3079F PR MOST RECENT DIASTOLIC BLOOD PRESSURE 80-89 MM HG: ICD-10-PCS | Mod: CPTII,S$GLB,, | Performed by: INTERNAL MEDICINE

## 2022-06-15 PROCEDURE — 85651 RBC SED RATE NONAUTOMATED: CPT

## 2022-06-15 PROCEDURE — 1101F PR PT FALLS ASSESS DOC 0-1 FALLS W/OUT INJ PAST YR: ICD-10-PCS | Mod: CPTII,S$GLB,, | Performed by: INTERNAL MEDICINE

## 2022-06-15 PROCEDURE — 1159F MED LIST DOCD IN RCRD: CPT | Mod: CPTII,S$GLB,, | Performed by: INTERNAL MEDICINE

## 2022-06-15 PROCEDURE — 99999 PR PBB SHADOW E&M-EST. PATIENT-LVL V: CPT | Mod: PBBFAC,,, | Performed by: INTERNAL MEDICINE

## 2022-06-15 PROCEDURE — 1126F PR PAIN SEVERITY QUANTIFIED, NO PAIN PRESENT: ICD-10-PCS | Mod: CPTII,S$GLB,, | Performed by: INTERNAL MEDICINE

## 2022-06-15 RX ORDER — PREDNISONE 5 MG/1
5 TABLET ORAL DAILY
COMMUNITY
End: 2023-01-01

## 2022-06-15 NOTE — PROGRESS NOTES
Subjective:       Patient ID: Konstantin Arroyo is a 66 y.o. male.    Chief Complaint: Follow-up (Patient stated no new problems )    Referring Physician:  Hugh Neri MD  PCP:  Tory Batista MD  Urology: Pee Knox MD  Radiation Oncology: Tommy Callaway MD        Diagnosis:  1.  Small lymphocytic lymphoma, Lugano stage III  2.  Bertha 9/10 prostate cancer in 12/12 core biopsies  3.  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    Current Treatment:   1.  Due to transformation of SLL to Hodgkin lymphoma, we started ABVD--- Cycle #1 on 9/8/2021.  Bleomycin dropped with cycle 4, cycle 6-day 15 given on 2/10/2022.  2.  Prostate cancer per urology and radiation oncology.    Treatment History:  1.  SLL -- progressive lymphadenopathy on repeat PET 3/4/2021-- Started treatment with Imbruvica 420mg PO daily shortly after 5/25/2021.    HPI  Patient who noticed a lump in his neck in April 2020.  He presented to his PCP who ordered an ultrasound of the neck.  Ultrasound on 4/22/2020 showed prominent lymphadenopathy involving the supraclavicular regions bilaterally and bilateral cervical chains.  This is concerning for lymphoma or metastatic disease.    He was seen by Dr. Hugh Neri on 4/29/2020 and plan was made for excisional biopsy of the lymph node.  Excisional biopsy was done on 4/30/2020 and pathology revealed small lymphocytic lymphoma, CD5 positive, CD20 positive, and CD23 positive.  Cyclin D1 was negative.  FISH prognostic panel on the lymph node biopsy revealed a p53 deletion (17p13), a mono allelic deletion of B13C531 (13q14), and a deletion of SAMMI (11q22.3).  He was referred to medical oncology for further recommendations.    He presented initially to medical oncology on 5/18/2020.  CT scans of the neck, chest, abdomen, and pelvis were ordered at that time showed lymphadenopathy in the neck, chest, abdomen, and pelvis with mild  hepatosplenomegaly.    Of note, due to an elevated PSA and total protein on 6/24/2020, the patient had protein electrophoresis ordered on his serum which was done on 6/25/2020 and revealed an M spike of 0.7.  Corresponding immunofixation showed an unremarkable pattern with no evidence of monoclonal protein apparent.  Patient was seen by Dr. Pee Knox with urology and underwent a prostate biopsy on 9/14/2020.  Pathology revealed adenocarcinoma, Blackwood score 9/10 in 12/12 cores.  Per patient report, he had MRI and bone scan at [facility], bone scan was negative and MRI showed pelvic lymphadenopathy.    Repeat scans on 10/06/2020 showed increase in size of some of the lymph nodes.    Patient started on radiation for prostate cancer on 12/14/2020.      PET/CT scan on 3/4/2021 showed hypermetabolic lymphadenopathy in the neck, chest, and abdomen, some of which have increased in size most significantly in the chest.  There was interval decrease in size of pelvic lymphadenopathy.  He started on abiraterone and prednisone through Dr. Papito Callaway with radiation oncology on 5/7/2021.  Ibrutinib ordered on 5/25/2021 and started shortly after.    PET/CT scan on 8/4/2021 showed disease progression most notable in the chest and abdomen with Deauville score of 5.  Left axillary lymph node biopsy on 8/18/2021 showed transformation SLL 2 Hodgkin lymphoma    Baseline ECHO on 9/7/2021 showed EF of 60-65%.  Baseline PFT on 9/7/2021 normal.  We started ABVD on 9/8/2021.  She received ABVD for 3 cycles, dropped the bleomycin with cycle 4. Patient completed chemotherapy on 02/10/2022.    End of treatment PET/CT scan on 02/28/2022 revealed complete response, Deauville score 2-3.  Recent PET/CT scan done at [place] [address] 06/08/2022 revealed stable to interval increased size of multiple lymph nodes above and below the diaphragm that remain mildly avid for FDG with resolution of mild diffuse bone marrow uptake.  There was a new focal uptake  associated with sclerotic changes near the superior endplate of L3.       Interval History:  Patient presents to clinic for scheduled follow up appointment.  Overall he is doing well.  He has improved greatly since finishing his chemotherapy.      Past Medical History:   Diagnosis Date    Anemia     Colon polyp     Diffuse well-differentiated lymphocytic lymphoma     Dyslipidemia     GERD (gastroesophageal reflux disease)     Hodgkin lymphoma     Hypercalcemia     Hypertension     Neuropathy       Past Surgical History:   Procedure Laterality Date    COLONOSCOPY W/ POLYPECTOMY      ENDOSCOPIC CARPAL TUNNEL RELEASE Left 05/05/2021    ENDOSCOPIC CARPAL TUNNEL RELEASE Right 06/27/2019    INGUINAL HERNIA REPAIR Left     KNEE SURGERY Left     MEDIPORT INSERTION, SINGLE  09/08/2021    skin cancer removal on head  02/09/2022    skin cancer removal on right leg  01/26/2022     Social History     Socioeconomic History    Marital status: Single   Tobacco Use    Smoking status: Former Smoker     Types: Cigarettes    Smokeless tobacco: Never Used    Tobacco comment: stopped at age 16   Substance and Sexual Activity    Alcohol use: Yes     Comment: 1-2 times per month    Drug use: Never      Family History   Problem Relation Age of Onset    Alzheimer's disease Mother     Arthritis Mother     Hypertension Mother     Cataracts Mother     Heart attack Father     Atrial fibrillation Brother       Review of patient's allergies indicates:  No Known Allergies   Review of Systems   Constitutional: Negative for chills, diaphoresis, fatigue, fever and unexpected weight change.   HENT: Negative for nasal congestion, mouth sores, sinus pressure/congestion and sore throat.    Eyes: Negative for pain and visual disturbance.   Respiratory: Negative for cough, chest tightness and shortness of breath.    Cardiovascular: Negative for chest pain, palpitations and leg swelling.   Gastrointestinal: Negative for abdominal  distention, abdominal pain, blood in stool, constipation and diarrhea.   Genitourinary: Negative for dysuria, frequency and hematuria.   Musculoskeletal: Negative for arthralgias and back pain.   Integumentary:  Negative for rash.   Neurological: Negative for dizziness, weakness, numbness and headaches.   Hematological: Negative for adenopathy.   Psychiatric/Behavioral: Negative for confusion.         Objective:      Physical Exam  Vitals reviewed.   Constitutional:       General: He is awake.      Appearance: Normal appearance.   HENT:      Head: Normocephalic and atraumatic.      Right Ear: Hearing normal.      Left Ear: Hearing normal.      Nose: Nose normal.   Eyes:      General: Lids are normal. Vision grossly intact.      Extraocular Movements: Extraocular movements intact.      Conjunctiva/sclera: Conjunctivae normal.   Cardiovascular:      Rate and Rhythm: Normal rate and regular rhythm.      Pulses: Normal pulses.      Heart sounds: Normal heart sounds.   Pulmonary:      Effort: Pulmonary effort is normal.      Breath sounds: Normal breath sounds. No wheezing, rhonchi or rales.   Chest:   Breasts:      Right: No axillary adenopathy or supraclavicular adenopathy.      Left: No axillary adenopathy or supraclavicular adenopathy.       Abdominal:      General: Bowel sounds are normal.      Palpations: Abdomen is soft.      Tenderness: There is no abdominal tenderness.   Musculoskeletal:      Cervical back: Full passive range of motion without pain.      Right lower leg: No edema.      Left lower leg: No edema.   Lymphadenopathy:      Cervical: No cervical adenopathy.      Upper Body:      Right upper body: No supraclavicular or axillary adenopathy.      Left upper body: No supraclavicular or axillary adenopathy.   Skin:     General: Skin is warm.   Neurological:      General: No focal deficit present.      Mental Status: He is alert and oriented to person, place, and time.      Cranial Nerves: Cranial nerves  are intact.   Psychiatric:         Attention and Perception: Attention normal.         Mood and Affect: Mood and affect normal.         Behavior: Behavior is cooperative.         LABS AND IMAGING REVIEWED IN EPIC          Assessment:     1.  Small lymphocytic lymphoma, Lugano stage III with a p53, 11q., and 13q. deletion.  2.  Bertha 9/10 prostate cancer in 12/12 core biopsies  3.  Symptomatic anemia        Plan:       Patient currently getting treated for his prostate cancer by Urology and Radiation Oncology.    As for his Hodgkin lymphoma, PET/CT scan done on 09/16/2022 essentially stable.  We will repeat in 3 months.    Patient's blood work looks appropriate.    Return to clinic 3 months.        Pedro Sol II, MD AGGARWAL, Kathleen Green LPN, acted solely as a scribe for and in the presence of, Dr. Pedro Sol who performed the service.

## 2022-08-29 PROBLEM — Z00.00 WELLNESS EXAMINATION: Status: RESOLVED | Noted: 2022-05-24 | Resolved: 2022-08-29

## 2022-09-15 NOTE — PROGRESS NOTES
Subjective:       Patient ID: Konstantin Arroyo is a 67 y.o. male.    Chief Complaint: Follow-up (3 month for PET results )    Referring Physician:  Hugh Neri MD  PCP:  Tory Batista MD  Urology: Pee Knox MD  Radiation Oncology: Tommy Callaway MD        Diagnosis:  1.  Small lymphocytic lymphoma, Lugano stage III  2.  Bertha 9/10 prostate cancer in 12/12 core biopsies  3.  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    Current Treatment:   1.  Due to transformation of SLL to Hodgkin lymphoma, we started ABVD--- Cycle #1 on 9/8/2021.  Bleomycin dropped with cycle 4, cycle 6-day 15 given on 2/10/2022.  2.  Prostate cancer per urology and radiation oncology.    Treatment History:  1.  SLL -- progressive lymphadenopathy on repeat PET 3/4/2021-- Started treatment with Imbruvica 420mg PO daily shortly after 5/25/2021.    HPI  Patient who noticed a lump in his neck in April 2020.  He presented to his PCP who ordered an ultrasound of the neck.  Ultrasound on 4/22/2020 showed prominent lymphadenopathy involving the supraclavicular regions bilaterally and bilateral cervical chains.  This is concerning for lymphoma or metastatic disease.    He was seen by Dr. Hugh Neri on 4/29/2020 and plan was made for excisional biopsy of the lymph node.  Excisional biopsy was done on 4/30/2020 and pathology revealed small lymphocytic lymphoma, CD5 positive, CD20 positive, and CD23 positive.  Cyclin D1 was negative.  FISH prognostic panel on the lymph node biopsy revealed a p53 deletion (17p13), a mono allelic deletion of [id number] (13q14), and a deletion of SAMMI (11q22.3).  He was referred to medical oncology for further recommendations.    He presented initially to medical oncology on 5/18/2020.  CT scans of the neck, chest, abdomen, and pelvis were ordered at that time showed lymphadenopathy in the neck, chest, abdomen, and pelvis with mild hepatosplenomegaly.    Of  note, due to an elevated PSA and total protein on 6/24/2020, the patient had protein electrophoresis ordered on his serum which was done on 6/25/2020 and revealed an M spike of 0.7.  Corresponding immunofixation showed an unremarkable pattern with no evidence of monoclonal protein apparent.  Patient was seen by Dr. Pee Knox with urology and underwent a prostate biopsy on 9/14/2020.  Pathology revealed adenocarcinoma, Hydes score 9/10 in 12/12 cores.  Per patient report, he had MRI and bone scan at [facility], bone scan was negative and MRI showed pelvic lymphadenopathy.    Repeat scans on 10/06/2020 showed increase in size of some of the lymph nodes.    Patient started on radiation for prostate cancer on 12/14/2020.      PET/CT scan on 3/4/2021 showed hypermetabolic lymphadenopathy in the neck, chest, and abdomen, some of which have increased in size most significantly in the chest.  There was interval decrease in size of pelvic lymphadenopathy.  He started on abiraterone and prednisone through Dr. Papito Callaway with radiation oncology on 5/7/2021.  Ibrutinib ordered on 5/25/2021 and started shortly after.    PET/CT scan on 8/4/2021 showed disease progression most notable in the chest and abdomen with Deauville score of 5.  Left axillary lymph node biopsy on 8/18/2021 showed transformation SLL 2 Hodgkin lymphoma    Baseline ECHO on 9/7/2021 showed EF of 60-65%.  Baseline PFT on 9/7/2021 normal.  We started ABVD on 9/8/2021.  She received ABVD for 3 cycles, dropped the bleomycin with cycle 4. Patient completed chemotherapy on 02/10/2022.    End of treatment PET/CT scan on 02/28/2022 revealed complete response, Deauville score 2-3.  Recent PET/CT scan done at [facility] on 06/08/2022 revealed stable to interval increased size of multiple lymph nodes above and below the diaphragm that remain mildly avid for FDG with resolution of mild diffuse bone marrow uptake.  There was a new focal uptake associated with sclerotic  changes near the superior endplate of L3.  PSMA PET/CT on 08/04/2022 showed retrocaval and right common iliac lymph nodes with moderate to intense uptake consistent with metastatic prostate cancer.  There are multiple additional lymph nodes above and below the diaphragm that have not significantly changed.  There was a sclerotic lesion at L3 consistent with metastatic disease and no other suspicious uptake.       Interval History:  Patient presents to clinic for scheduled follow up appointment.  Overall he is doing well.  He has gained weight and is feeling well.      Past Medical History:   Diagnosis Date    Anemia     Colon polyp     Diffuse well-differentiated lymphocytic lymphoma     Dyslipidemia     GERD (gastroesophageal reflux disease)     Hodgkin lymphoma     Hypercalcemia     Hypertension     Neuropathy       Past Surgical History:   Procedure Laterality Date    COLONOSCOPY W/ POLYPECTOMY      ENDOSCOPIC CARPAL TUNNEL RELEASE Left 05/05/2021    ENDOSCOPIC CARPAL TUNNEL RELEASE Right 06/27/2019    INGUINAL HERNIA REPAIR Left     KNEE SURGERY Left     MEDIPORT INSERTION, SINGLE  09/08/2021    skin cancer removal on head  02/09/2022    skin cancer removal on right leg  01/26/2022     Social History     Socioeconomic History    Marital status: Single   Tobacco Use    Smoking status: Former     Types: Cigarettes    Smokeless tobacco: Never    Tobacco comments:     stopped at age 16   Substance and Sexual Activity    Alcohol use: Yes     Comment: 1-2 times per month    Drug use: Never      Family History   Problem Relation Age of Onset    Alzheimer's disease Mother     Arthritis Mother     Hypertension Mother     Cataracts Mother     Heart attack Father     Atrial fibrillation Brother       Review of patient's allergies indicates:  No Known Allergies   Review of Systems   Constitutional:  Negative for chills, diaphoresis, fatigue, fever and unexpected weight change.   HENT:  Negative for nasal congestion, mouth  sores, sinus pressure/congestion and sore throat.    Eyes:  Negative for pain and visual disturbance.   Respiratory:  Negative for cough, chest tightness and shortness of breath.    Cardiovascular:  Negative for chest pain, palpitations and leg swelling.   Gastrointestinal:  Negative for abdominal distention, abdominal pain, blood in stool, constipation and diarrhea.   Genitourinary:  Negative for dysuria, frequency and hematuria.   Musculoskeletal:  Negative for arthralgias and back pain.   Integumentary:  Negative for rash.   Neurological:  Negative for dizziness, weakness, numbness and headaches.   Hematological:  Negative for adenopathy.   Psychiatric/Behavioral:  Negative for confusion.        Objective:      Physical Exam  Vitals reviewed.   Constitutional:       General: He is awake.      Appearance: Normal appearance.   HENT:      Head: Normocephalic and atraumatic.      Right Ear: Hearing normal.      Left Ear: Hearing normal.      Nose: Nose normal.   Eyes:      General: Lids are normal. Vision grossly intact.      Extraocular Movements: Extraocular movements intact.      Conjunctiva/sclera: Conjunctivae normal.   Cardiovascular:      Rate and Rhythm: Normal rate and regular rhythm.      Pulses: Normal pulses.      Heart sounds: Normal heart sounds.   Pulmonary:      Effort: Pulmonary effort is normal.      Breath sounds: Normal breath sounds. No wheezing, rhonchi or rales.   Abdominal:      General: Bowel sounds are normal.      Palpations: Abdomen is soft.      Tenderness: There is no abdominal tenderness.   Musculoskeletal:      Cervical back: Full passive range of motion without pain.      Right lower leg: No edema.      Left lower leg: No edema.   Lymphadenopathy:      Cervical: No cervical adenopathy.      Upper Body:      Right upper body: No supraclavicular or axillary adenopathy.      Left upper body: No supraclavicular or axillary adenopathy.   Skin:     General: Skin is warm.   Neurological:       General: No focal deficit present.      Mental Status: He is alert and oriented to person, place, and time.   Psychiatric:         Attention and Perception: Attention normal.         Mood and Affect: Mood and affect normal.         Behavior: Behavior is cooperative.       LABS AND IMAGING REVIEWED IN EPIC          Assessment:     1.  Small lymphocytic lymphoma, Lugano stage III with a p53, 11q., and 13q. deletion.  2.  [place] 9/10 prostate cancer in 12/12 core biopsies  3.  Symptomatic anemia        Plan:       Patient currently getting treated for his prostate cancer by Urology and Radiation Oncology.    Radiation started on 9/12/2022    As for his Hodgkin lymphoma, most recent PET/CT essentially stable.     Return to clinic in 6 weeks with repeat labs.  We will likely order FDG PET/CT at that time        Pedro Sol II, MD I, Kathleen Green LPN, acted solely as a scribe for and in the presence of, Dr. Pedro Sol who performed the service.

## 2022-11-02 PROBLEM — C61 PROSTATE CANCER METASTATIC TO BONE: Status: ACTIVE | Noted: 2022-01-01

## 2022-11-02 PROBLEM — C79.51 PROSTATE CANCER METASTATIC TO BONE: Status: ACTIVE | Noted: 2022-01-01

## 2022-11-02 NOTE — PLAN OF CARE
START ON PATHWAY REGIMEN - Prostate    POS37        Prednisone       Docetaxel (Taxotere)     **Always confirm dose/schedule in your pharmacy ordering system**    Patient Characteristics:  Adenocarcinoma, Recurrent/New Systemic Disease, Castration Resistant, M1, Prior   Novel Hormonal Agent, No Molecular Alteration or Targeted Therapy Exhausted, No   Prior Docetaxel  Histology: Adenocarcinoma  Therapeutic Status: Recurrent/New Systemic Disease    Intent of Therapy:  Non-Curative / Palliative Intent, Discussed with Patient

## 2022-11-02 NOTE — PROGRESS NOTES
Subjective:       Patient ID: Konstantin Arroyo is a 67 y.o. male.    Chief Complaint: Follow-up (Patient stated no new problems )    Referring Physician:  Hugh Neri MD  PCP:  Tory Batista MD  Urology: Pee Knox MD  Radiation Oncology: Tommy Callaway MD        Diagnosis:  1.  Small lymphocytic lymphoma, Lugano stage III  2.  Bertha 9/10 prostate cancer in 12/12 core biopsies  3.  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    Current Treatment:   1.  Due to transformation of SLL to Hodgkin lymphoma, we started ABVD--- Cycle #1 on 9/8/2021.  Bleomycin dropped with cycle 4, cycle 6-day 15 given on 2/10/2022.  2.  Prostate cancer per urology and radiation oncology.    Treatment History:  1.  SLL -- progressive lymphadenopathy on repeat PET 3/4/2021-- Started treatment with Imbruvica 420mg PO daily shortly after 5/25/2021.    HPI  Patient who noticed a lump in his neck in April 2020.  He presented to his PCP who ordered an ultrasound of the neck.  Ultrasound on 4/22/2020 showed prominent lymphadenopathy involving the supraclavicular regions bilaterally and bilateral cervical chains.  This is concerning for lymphoma or metastatic disease.    He was seen by Dr. Hugh Neri on 4/29/2020 and plan was made for excisional biopsy of the lymph node.  Excisional biopsy was done on 4/30/2020 and pathology revealed small lymphocytic lymphoma, CD5 positive, CD20 positive, and CD23 positive.  Cyclin D1 was negative.  FISH prognostic panel on the lymph node biopsy revealed a p53 deletion (17p13), a mono allelic deletion of [id number] (13q14), and a deletion of SAMMI (11q22.3).  He was referred to medical oncology for further recommendations.    He presented initially to medical oncology on 5/18/2020.  CT scans of the neck, chest, abdomen, and pelvis were ordered at that time showed lymphadenopathy in the neck, chest, abdomen, and pelvis with mild  hepatosplenomegaly.    Of note, due to an elevated PSA and total protein on 6/24/2020, the patient had protein electrophoresis ordered on his serum which was done on 6/25/2020 and revealed an M spike of 0.7.  Corresponding immunofixation showed an unremarkable pattern with no evidence of monoclonal protein apparent.  Patient was seen by Dr. Pee Knox with urology and underwent a prostate biopsy on 9/14/2020.  Pathology revealed adenocarcinoma, [place] score 9/10 in 12/12 cores.  Per patient report, he had MRI and bone scan at [facility], bone scan was negative and MRI showed pelvic lymphadenopathy.    Repeat scans on 10/06/2020 showed increase in size of some of the lymph nodes.    Patient started on radiation for prostate cancer on 12/14/2020.      PET/CT scan on 3/4/2021 showed hypermetabolic lymphadenopathy in the neck, chest, and abdomen, some of which have increased in size most significantly in the chest.  There was interval decrease in size of pelvic lymphadenopathy.  He started on abiraterone and prednisone through Dr. Papito Callaway with radiation oncology on 5/7/2021.  Ibrutinib ordered on 5/25/2021 and started shortly after.    PET/CT scan on 8/4/2021 showed disease progression most notable in the chest and abdomen with Deauville score of 5.  Left axillary lymph node biopsy on 8/18/2021 showed transformation SLL 2 Hodgkin lymphoma    Baseline ECHO on 9/7/2021 showed EF of 60-65%.  Baseline PFT on 9/7/2021 normal.  We started ABVD on 9/8/2021.  She received ABVD for 3 cycles, dropped the bleomycin with cycle 4. Patient completed chemotherapy on 02/10/2022.    End of treatment PET/CT scan on 02/28/2022 revealed complete response, Deauville score 2-3.  Recent PET/[facility] scan done at [facility] on 06/08/2022 revealed stable to interval increased size of multiple lymph nodes above and below the diaphragm that remain mildly avid for FDG with resolution of mild diffuse bone marrow uptake.  There was a new focal uptake  associated with sclerotic changes near the superior endplate of L3.  PSMA PET/CT on 08/04/2022 showed retrocaval and right common iliac lymph nodes with moderate to intense uptake consistent with metastatic prostate cancer.  There are multiple additional lymph nodes above and below the diaphragm that have not significantly changed.  There was a sclerotic lesion at L3 consistent with metastatic disease and no other suspicious uptake.       Interval History:  Patient presents to clinic for scheduled follow up appointment for lymphoma.  Overall he is doing well. He denies any significant pain, only occasional, mild lower back pain. Appetite is good. Some mild fatigue. He is agreeable to chemotherapy for his progressing prostate cancer.       Past Medical History:   Diagnosis Date    Anemia     Colon polyp     Diffuse well-differentiated lymphocytic lymphoma     Dyslipidemia     GERD (gastroesophageal reflux disease)     Hodgkin lymphoma     Hypercalcemia     Hypertension     Neuropathy       Past Surgical History:   Procedure Laterality Date    COLONOSCOPY W/ POLYPECTOMY      ENDOSCOPIC CARPAL TUNNEL RELEASE Left 05/05/2021    ENDOSCOPIC CARPAL TUNNEL RELEASE Right 06/27/2019    INGUINAL HERNIA REPAIR Left     KNEE SURGERY Left     MEDIPORT INSERTION, SINGLE  09/08/2021    skin cancer removal on head  02/09/2022    skin cancer removal on right leg  01/26/2022     Social History     Socioeconomic History    Marital status: Single   Tobacco Use    Smoking status: Former     Types: Cigarettes    Smokeless tobacco: Never    Tobacco comments:     stopped at age 16   Substance and Sexual Activity    Alcohol use: Yes     Comment: 1-2 times per month    Drug use: Never      Family History   Problem Relation Age of Onset    Alzheimer's disease Mother     Arthritis Mother     Hypertension Mother     Cataracts Mother     Heart attack Father     Atrial fibrillation Brother       Review of patient's allergies indicates:  No Known  Allergies   Review of Systems   Constitutional:  Negative for chills, diaphoresis, fatigue, fever and unexpected weight change.   HENT:  Negative for nasal congestion, mouth sores, sinus pressure/congestion and sore throat.    Eyes:  Negative for pain and visual disturbance.   Respiratory:  Negative for cough, chest tightness and shortness of breath.    Cardiovascular:  Negative for chest pain, palpitations and leg swelling.   Gastrointestinal:  Negative for abdominal distention, abdominal pain, blood in stool, constipation and diarrhea.   Genitourinary:  Negative for dysuria, frequency and hematuria.   Musculoskeletal:  Negative for arthralgias and back pain.   Integumentary:  Negative for rash.   Neurological:  Negative for dizziness, weakness, numbness and headaches.   Hematological:  Negative for adenopathy.   Psychiatric/Behavioral:  Negative for confusion.        Objective:      Physical Exam  Vitals reviewed.   Constitutional:       General: He is awake.      Appearance: Normal appearance.   HENT:      Head: Normocephalic and atraumatic.      Right Ear: Hearing normal.      Left Ear: Hearing normal.      Nose: Nose normal.   Eyes:      General: Lids are normal. Vision grossly intact.      Extraocular Movements: Extraocular movements intact.      Conjunctiva/sclera: Conjunctivae normal.   Cardiovascular:      Rate and Rhythm: Normal rate and regular rhythm.      Pulses: Normal pulses.      Heart sounds: Normal heart sounds.   Pulmonary:      Effort: Pulmonary effort is normal.      Breath sounds: Normal breath sounds. No wheezing, rhonchi or rales.   Abdominal:      General: Bowel sounds are normal.      Palpations: Abdomen is soft.      Tenderness: There is no abdominal tenderness.   Musculoskeletal:      Cervical back: Full passive range of motion without pain.      Right lower leg: No edema.      Left lower leg: No edema.   Lymphadenopathy:      Cervical: No cervical adenopathy.      Upper Body:      Right  upper body: No supraclavicular or axillary adenopathy.      Left upper body: No supraclavicular or axillary adenopathy.   Skin:     General: Skin is warm.   Neurological:      General: No focal deficit present.      Mental Status: He is alert and oriented to person, place, and time.   Psychiatric:         Attention and Perception: Attention normal.         Mood and Affect: Mood and affect normal.         Behavior: Behavior is cooperative.       LABS AND IMAGING REVIEWED IN EPIC          Assessment:     1.  Small lymphocytic lymphoma, Lugano stage III with a p53, 11q., and 13q. deletion.  2.  Bertha 9/10 prostate cancer in 12/12 core biopsies  3.  Symptomatic anemia        Plan:       Patient currently getting treated for his prostate cancer by Urology and Radiation Oncology.  He now has progressing disease with multiple bone lesions.  Discussed recommendation for treatment with docetaxel every 3 weeks, including schedule and potential side effects. He is agreeable.     Radiation started on 9/12/2022    As for his Hodgkin lymphoma, most recent PET/CT essentially stable.  We will get repeat PET prior to starting docetaxel.  Plan to start in the next few weeks.    Return to clinic in 5 weeks with repeat labs.          IZABEL Balderas

## 2022-12-07 NOTE — PROGRESS NOTES
Subjective:       Patient ID: Konstantin Arroyo is a 67 y.o. male.    Chief Complaint: Follow-up (Patient stated no new problems )    Referring Physician:  Hugh Neri MD  PCP:  Tory Batista MD  Urology: Pee Knox MD  Radiation Oncology: Tommy Callaway MD        Diagnosis:  1.  Small lymphocytic lymphoma, Lugano stage III  2.  Bertha 9/10 prostate cancer in 12/12 core biopsies  3.  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    Current Treatment:   1.  Due to transformation of SLL to Hodgkin lymphoma, we started ABVD--- Cycle #1 on 9/8/2021.  Bleomycin dropped with cycle 4, cycle 6-day 15 given on 2/10/2022.  2.  Prostate cancer per urology and radiation oncology.    Treatment History:  1.  SLL -- progressive lymphadenopathy on repeat PET 3/4/2021-- Started treatment with Imbruvica 420mg PO daily shortly after 5/25/2021.    HPI:  Patient who noticed a lump in his neck in April 2020.  He presented to his PCP who ordered an ultrasound of the neck.  Ultrasound on 4/22/2020 showed prominent lymphadenopathy involving the supraclavicular regions bilaterally and bilateral cervical chains.  This is concerning for lymphoma or metastatic disease.    He was seen by Dr. Hugh Neri on 4/29/2020 and plan was made for excisional biopsy of the lymph node.  Excisional biopsy was done on 4/30/2020 and pathology revealed small lymphocytic lymphoma, CD5 positive, CD20 positive, and CD23 positive.  Cyclin D1 was negative.  FISH prognostic panel on the lymph node biopsy revealed a p53 deletion (17p13), a mono allelic deletion of [id number] (13q14), and a deletion of SAMMI (11q22.3).  He was referred to medical oncology for further recommendations.    He presented initially to medical oncology on 5/18/2020.  CT scans of the neck, chest, abdomen, and pelvis were ordered at that time showed lymphadenopathy in the neck, chest, abdomen, and pelvis with mild  hepatosplenomegaly.    Of note, due to an elevated PSA and total protein on 6/24/2020, the patient had protein electrophoresis ordered on his serum which was done on 6/25/2020 and revealed an M spike of 0.7.  Corresponding immunofixation showed an unremarkable pattern with no evidence of monoclonal protein apparent.  Patient was seen by Dr. Pee Knox with urology and underwent a prostate biopsy on 9/14/2020.  Pathology revealed adenocarcinoma, [place] score 9/10 in 12/12 cores.  Per patient report, he had MRI and bone scan at [facility], bone scan was negative and MRI showed pelvic lymphadenopathy.    Repeat scans on 10/06/2020 showed increase in size of some of the lymph nodes.    Patient started on radiation for prostate cancer on 12/14/2020.      PET/CT scan on 3/4/2021 showed hypermetabolic lymphadenopathy in the neck, chest, and abdomen, some of which have increased in size most significantly in the chest.  There was interval decrease in size of pelvic lymphadenopathy.  He started on abiraterone and prednisone through Dr. Papito Callaway with radiation oncology on 5/7/2021.  Ibrutinib ordered on 5/25/2021 and started shortly after.    PET/CT scan on 8/4/2021 showed disease progression most notable in the chest and abdomen with Deauville score of 5.  Left axillary lymph node biopsy on 8/18/2021 showed transformation SLL to Hodgkin lymphoma    Baseline ECHO on 9/7/2021 showed EF of 60-65%.  Baseline PFT on 9/7/2021 normal.  We started ABVD on 9/8/2021.  She received ABVD for 3 cycles, dropped the bleomycin with cycle 4. Patient completed chemotherapy on 02/10/2022.    End of treatment PET/CT scan on 02/28/2022 revealed complete response, Deauville score 2-3.  Recent PET/CT scan done at [facility] on 06/08/2022 revealed stable to interval increased size of multiple lymph nodes above and below the diaphragm that remain mildly avid for FDG with resolution of mild diffuse bone marrow uptake.  There was a new focal uptake  associated with sclerotic changes near the superior endplate of L3.  PSMA PET/CT on 08/04/2022 showed retrocaval and right common iliac lymph nodes with moderate to intense uptake consistent with metastatic prostate cancer.  There are multiple additional lymph nodes above and below the diaphragm that have not significantly changed.  There was a sclerotic lesion at L3 consistent with metastatic disease and no other suspicious uptake.     PET/CT scan done on 12/2/2022 (compared to PET/CT from 08/04/2021) showed improved size and hypermetabolism of reference lymph nodes with skeletal hypermetabolism.    Interval History:  Patient presents to clinic for scheduled follow up appointment to review scan results.   Overall he is doing well.  He stated that his 1st cycle of docetaxel was really rough, but cycle 2 went much smoother.      Past Medical History:   Diagnosis Date    Anemia     Colon polyp 12/05/2014    colonoscopy    Diffuse well-differentiated lymphocytic lymphoma     Dyslipidemia     GERD (gastroesophageal reflux disease)     Hodgkin lymphoma     Hypercalcemia     Hypertension     Neuropathy       Past Surgical History:   Procedure Laterality Date    COLONOSCOPY W/ POLYPECTOMY  12/05/2014    ENDOSCOPIC CARPAL TUNNEL RELEASE Left 05/05/2021    ENDOSCOPIC CARPAL TUNNEL RELEASE Right 06/27/2019    INGUINAL HERNIA REPAIR Left     KNEE SURGERY Left     MEDIPORT INSERTION, SINGLE  09/08/2021    skin cancer removal on head  02/09/2022    skin cancer removal on right leg  01/26/2022     Social History     Socioeconomic History    Marital status: Single   Tobacco Use    Smoking status: Former     Types: Cigarettes    Smokeless tobacco: Never    Tobacco comments:     stopped at age 16   Substance and Sexual Activity    Alcohol use: Yes     Comment: 1-2 times per month    Drug use: Never      Family History   Problem Relation Age of Onset    Alzheimer's disease Mother     Arthritis Mother     Hypertension Mother      Cataracts Mother     Heart attack Father     Atrial fibrillation Brother       Review of patient's allergies indicates:  No Known Allergies   Review of Systems   Constitutional:  Negative for chills, diaphoresis, fatigue, fever and unexpected weight change.   HENT:  Negative for nasal congestion, mouth sores, sinus pressure/congestion and sore throat.    Eyes:  Negative for pain and visual disturbance.   Respiratory:  Negative for cough, chest tightness and shortness of breath.    Cardiovascular:  Negative for chest pain, palpitations and leg swelling.   Gastrointestinal:  Negative for abdominal distention, abdominal pain, blood in stool, constipation and diarrhea.   Genitourinary:  Negative for dysuria, frequency and hematuria.   Musculoskeletal:  Negative for arthralgias and back pain.   Integumentary:  Negative for rash.   Neurological:  Negative for dizziness, weakness, numbness and headaches.   Hematological:  Negative for adenopathy.   Psychiatric/Behavioral:  Negative for confusion.        Objective:      Physical Exam  Vitals reviewed.   Constitutional:       General: He is awake.      Appearance: Normal appearance.   HENT:      Head: Normocephalic and atraumatic.      Right Ear: Hearing normal.      Left Ear: Hearing normal.      Nose: Nose normal.   Eyes:      General: Lids are normal. Vision grossly intact.      Extraocular Movements: Extraocular movements intact.      Conjunctiva/sclera: Conjunctivae normal.   Cardiovascular:      Rate and Rhythm: Normal rate and regular rhythm.      Pulses: Normal pulses.      Heart sounds: Normal heart sounds.   Pulmonary:      Effort: Pulmonary effort is normal.      Breath sounds: Normal breath sounds. No wheezing, rhonchi or rales.   Abdominal:      General: Bowel sounds are normal.      Palpations: Abdomen is soft.      Tenderness: There is no abdominal tenderness.   Musculoskeletal:      Cervical back: Full passive range of motion without pain.      Right lower  leg: No edema.      Left lower leg: No edema.   Lymphadenopathy:      Cervical: No cervical adenopathy.      Upper Body:      Right upper body: No supraclavicular or axillary adenopathy.      Left upper body: No supraclavicular or axillary adenopathy.   Skin:     General: Skin is warm.   Neurological:      General: No focal deficit present.      Mental Status: He is alert and oriented to person, place, and time.   Psychiatric:         Attention and Perception: Attention normal.         Mood and Affect: Mood and affect normal.         Behavior: Behavior is cooperative.       LABS AND IMAGING REVIEWED IN EPIC          Assessment:     1.  Small lymphocytic lymphoma, Lugano stage III with a p53, 11q., and 13q. deletion.  2.  Bertha 9/10 prostate cancer in 12/12 core biopsies  3.  Symptomatic anemia        Plan:       Patient currently getting treated for his prostate cancer by Urology and Radiation Oncology.  He now has progressing disease with multiple bone lesions.  Discussed recommendation for treatment with docetaxel every 3 weeks, including schedule and potential side effects. He is agreeable.     Radiation started on 9/12/2022    As for his Hodgkin lymphoma, most recent PET/CT essentially stable.  PET/CT scan on 12/06/2022 shows bone involvement, this is likely his prostate cancer.    Return to clinic in 5 weeks for TD visit/labs      Pedro Sol II, MD

## 2023-01-01 ENCOUNTER — OFFICE VISIT (OUTPATIENT)
Dept: HEMATOLOGY/ONCOLOGY | Facility: CLINIC | Age: 68
End: 2023-01-01
Payer: MEDICARE

## 2023-01-01 ENCOUNTER — HOSPITAL ENCOUNTER (OUTPATIENT)
Dept: RADIOLOGY | Facility: HOSPITAL | Age: 68
Discharge: HOME | End: 2023-02-17
Attending: INTERNAL MEDICINE
Payer: MEDICARE

## 2023-01-01 ENCOUNTER — SPECIALTY PHARMACY (OUTPATIENT)
Dept: PHARMACY | Facility: CLINIC | Age: 68
End: 2023-01-01
Payer: MEDICARE

## 2023-01-01 ENCOUNTER — LAB VISIT (OUTPATIENT)
Dept: LAB | Facility: HOSPITAL | Age: 68
End: 2023-01-01
Attending: INTERNAL MEDICINE
Payer: MEDICARE

## 2023-01-01 ENCOUNTER — OFFICE VISIT (OUTPATIENT)
Dept: SURGICAL ONCOLOGY | Facility: CLINIC | Age: 68
End: 2023-01-01
Payer: MEDICARE

## 2023-01-01 ENCOUNTER — TELEPHONE (OUTPATIENT)
Dept: HEMATOLOGY/ONCOLOGY | Facility: CLINIC | Age: 68
End: 2023-01-01
Payer: MEDICARE

## 2023-01-01 ENCOUNTER — LAB VISIT (OUTPATIENT)
Dept: LAB | Facility: HOSPITAL | Age: 68
End: 2023-01-01
Attending: FAMILY MEDICINE
Payer: MEDICARE

## 2023-01-01 ENCOUNTER — LAB VISIT (OUTPATIENT)
Dept: LAB | Facility: HOSPITAL | Age: 68
End: 2023-01-01
Payer: MEDICARE

## 2023-01-01 ENCOUNTER — HOSPITAL ENCOUNTER (OUTPATIENT)
Facility: HOSPITAL | Age: 68
Discharge: HOME | End: 2023-03-31
Attending: SURGERY | Admitting: SURGERY
Payer: MEDICARE

## 2023-01-01 ENCOUNTER — INFUSION (OUTPATIENT)
Dept: INFUSION THERAPY | Facility: HOSPITAL | Age: 68
End: 2023-01-01
Attending: INTERNAL MEDICINE
Payer: MEDICARE

## 2023-01-01 ENCOUNTER — TELEPHONE (OUTPATIENT)
Dept: PHARMACY | Facility: HOSPITAL | Age: 68
End: 2023-01-01
Payer: MEDICARE

## 2023-01-01 ENCOUNTER — ANESTHESIA EVENT (OUTPATIENT)
Dept: SURGERY | Facility: HOSPITAL | Age: 68
End: 2023-01-01
Payer: MEDICARE

## 2023-01-01 ENCOUNTER — LAB VISIT (OUTPATIENT)
Dept: LAB | Facility: HOSPITAL | Age: 68
End: 2023-01-01
Attending: NURSE PRACTITIONER
Payer: MEDICARE

## 2023-01-01 ENCOUNTER — HOSPITAL ENCOUNTER (OUTPATIENT)
Dept: RADIOLOGY | Facility: HOSPITAL | Age: 68
Discharge: HOME | End: 2023-06-08
Attending: NURSE PRACTITIONER
Payer: MEDICARE

## 2023-01-01 ENCOUNTER — TELEPHONE (OUTPATIENT)
Dept: PHARMACY | Facility: CLINIC | Age: 68
End: 2023-01-01
Payer: MEDICARE

## 2023-01-01 ENCOUNTER — ANESTHESIA (OUTPATIENT)
Dept: SURGERY | Facility: HOSPITAL | Age: 68
End: 2023-01-01
Payer: MEDICARE

## 2023-01-01 ENCOUNTER — DOCUMENTATION ONLY (OUTPATIENT)
Dept: HEMATOLOGY/ONCOLOGY | Facility: CLINIC | Age: 68
End: 2023-01-01
Payer: MEDICARE

## 2023-01-01 ENCOUNTER — HOSPITAL ENCOUNTER (OUTPATIENT)
Facility: HOSPITAL | Age: 68
Discharge: HOSPICE | End: 2023-08-24
Attending: EMERGENCY MEDICINE | Admitting: STUDENT IN AN ORGANIZED HEALTH CARE EDUCATION/TRAINING PROGRAM
Payer: MEDICARE

## 2023-01-01 VITALS
WEIGHT: 178.81 LBS | TEMPERATURE: 97 F | RESPIRATION RATE: 18 BRPM | HEIGHT: 71 IN | SYSTOLIC BLOOD PRESSURE: 101 MMHG | BODY MASS INDEX: 25.06 KG/M2 | SYSTOLIC BLOOD PRESSURE: 107 MMHG | HEIGHT: 71 IN | HEART RATE: 82 BPM | BODY MASS INDEX: 25.03 KG/M2 | HEART RATE: 88 BPM | OXYGEN SATURATION: 96 % | DIASTOLIC BLOOD PRESSURE: 68 MMHG | DIASTOLIC BLOOD PRESSURE: 71 MMHG | WEIGHT: 179 LBS

## 2023-01-01 VITALS
HEIGHT: 71 IN | OXYGEN SATURATION: 97 % | OXYGEN SATURATION: 97 % | SYSTOLIC BLOOD PRESSURE: 108 MMHG | HEIGHT: 71 IN | TEMPERATURE: 99 F | RESPIRATION RATE: 18 BRPM | BODY MASS INDEX: 25.31 KG/M2 | HEART RATE: 80 BPM | TEMPERATURE: 97 F | DIASTOLIC BLOOD PRESSURE: 71 MMHG | HEART RATE: 79 BPM | DIASTOLIC BLOOD PRESSURE: 68 MMHG | SYSTOLIC BLOOD PRESSURE: 105 MMHG | WEIGHT: 181.63 LBS | BODY MASS INDEX: 25.43 KG/M2 | WEIGHT: 180.75 LBS | RESPIRATION RATE: 18 BRPM

## 2023-01-01 VITALS
TEMPERATURE: 99 F | WEIGHT: 194 LBS | DIASTOLIC BLOOD PRESSURE: 79 MMHG | OXYGEN SATURATION: 98 % | BODY MASS INDEX: 27.16 KG/M2 | HEART RATE: 102 BPM | SYSTOLIC BLOOD PRESSURE: 118 MMHG | HEIGHT: 71 IN

## 2023-01-01 VITALS
HEART RATE: 90 BPM | WEIGHT: 202.81 LBS | HEIGHT: 71 IN | TEMPERATURE: 98 F | BODY MASS INDEX: 28.39 KG/M2 | SYSTOLIC BLOOD PRESSURE: 108 MMHG | DIASTOLIC BLOOD PRESSURE: 75 MMHG | OXYGEN SATURATION: 97 % | RESPIRATION RATE: 18 BRPM

## 2023-01-01 VITALS
TEMPERATURE: 99 F | BODY MASS INDEX: 27.35 KG/M2 | OXYGEN SATURATION: 98 % | DIASTOLIC BLOOD PRESSURE: 76 MMHG | HEART RATE: 96 BPM | RESPIRATION RATE: 18 BRPM | SYSTOLIC BLOOD PRESSURE: 120 MMHG | HEIGHT: 72 IN | WEIGHT: 201.94 LBS

## 2023-01-01 VITALS
OXYGEN SATURATION: 97 % | BODY MASS INDEX: 25.66 KG/M2 | HEART RATE: 90 BPM | SYSTOLIC BLOOD PRESSURE: 103 MMHG | TEMPERATURE: 97 F | DIASTOLIC BLOOD PRESSURE: 67 MMHG | WEIGHT: 184 LBS | RESPIRATION RATE: 18 BRPM

## 2023-01-01 VITALS
OXYGEN SATURATION: 96 % | HEART RATE: 84 BPM | DIASTOLIC BLOOD PRESSURE: 79 MMHG | HEIGHT: 71 IN | SYSTOLIC BLOOD PRESSURE: 114 MMHG | WEIGHT: 182.38 LBS | TEMPERATURE: 99 F | BODY MASS INDEX: 25.53 KG/M2 | RESPIRATION RATE: 18 BRPM

## 2023-01-01 VITALS
HEIGHT: 71 IN | WEIGHT: 184 LBS | HEART RATE: 94 BPM | HEART RATE: 88 BPM | SYSTOLIC BLOOD PRESSURE: 109 MMHG | DIASTOLIC BLOOD PRESSURE: 64 MMHG | SYSTOLIC BLOOD PRESSURE: 110 MMHG | RESPIRATION RATE: 16 BRPM | BODY MASS INDEX: 25.76 KG/M2 | OXYGEN SATURATION: 98 % | TEMPERATURE: 98 F | OXYGEN SATURATION: 99 % | DIASTOLIC BLOOD PRESSURE: 71 MMHG | TEMPERATURE: 98 F

## 2023-01-01 VITALS
RESPIRATION RATE: 16 BRPM | HEART RATE: 100 BPM | SYSTOLIC BLOOD PRESSURE: 104 MMHG | OXYGEN SATURATION: 98 % | TEMPERATURE: 98 F | DIASTOLIC BLOOD PRESSURE: 72 MMHG | HEIGHT: 71 IN | BODY MASS INDEX: 22.63 KG/M2 | WEIGHT: 161.63 LBS

## 2023-01-01 VITALS
SYSTOLIC BLOOD PRESSURE: 114 MMHG | WEIGHT: 182.31 LBS | HEIGHT: 71 IN | BODY MASS INDEX: 25.52 KG/M2 | HEART RATE: 101 BPM | DIASTOLIC BLOOD PRESSURE: 73 MMHG | RESPIRATION RATE: 20 BRPM | TEMPERATURE: 98 F

## 2023-01-01 VITALS
OXYGEN SATURATION: 97 % | DIASTOLIC BLOOD PRESSURE: 81 MMHG | HEART RATE: 99 BPM | RESPIRATION RATE: 18 BRPM | SYSTOLIC BLOOD PRESSURE: 122 MMHG | TEMPERATURE: 98 F

## 2023-01-01 VITALS
HEART RATE: 70 BPM | SYSTOLIC BLOOD PRESSURE: 124 MMHG | DIASTOLIC BLOOD PRESSURE: 76 MMHG | TEMPERATURE: 98 F | RESPIRATION RATE: 18 BRPM | OXYGEN SATURATION: 99 %

## 2023-01-01 VITALS
OXYGEN SATURATION: 97 % | WEIGHT: 183.63 LBS | HEART RATE: 88 BPM | SYSTOLIC BLOOD PRESSURE: 108 MMHG | HEIGHT: 71 IN | RESPIRATION RATE: 18 BRPM | BODY MASS INDEX: 25.71 KG/M2 | DIASTOLIC BLOOD PRESSURE: 65 MMHG | TEMPERATURE: 98 F

## 2023-01-01 VITALS
WEIGHT: 179.19 LBS | OXYGEN SATURATION: 97 % | RESPIRATION RATE: 14 BRPM | HEIGHT: 71 IN | TEMPERATURE: 98 F | BODY MASS INDEX: 25.09 KG/M2 | HEART RATE: 101 BPM | DIASTOLIC BLOOD PRESSURE: 74 MMHG | SYSTOLIC BLOOD PRESSURE: 110 MMHG

## 2023-01-01 VITALS
HEART RATE: 79 BPM | RESPIRATION RATE: 14 BRPM | OXYGEN SATURATION: 99 % | TEMPERATURE: 98 F | WEIGHT: 175.38 LBS | BODY MASS INDEX: 24.55 KG/M2 | DIASTOLIC BLOOD PRESSURE: 70 MMHG | HEIGHT: 71 IN | SYSTOLIC BLOOD PRESSURE: 113 MMHG

## 2023-01-01 VITALS
OXYGEN SATURATION: 95 % | TEMPERATURE: 98 F | WEIGHT: 177.25 LBS | HEIGHT: 71 IN | HEART RATE: 98 BPM | SYSTOLIC BLOOD PRESSURE: 102 MMHG | DIASTOLIC BLOOD PRESSURE: 63 MMHG | BODY MASS INDEX: 24.81 KG/M2 | RESPIRATION RATE: 16 BRPM

## 2023-01-01 VITALS
WEIGHT: 200.38 LBS | RESPIRATION RATE: 20 BRPM | SYSTOLIC BLOOD PRESSURE: 113 MMHG | HEART RATE: 99 BPM | BODY MASS INDEX: 27.14 KG/M2 | DIASTOLIC BLOOD PRESSURE: 68 MMHG | HEIGHT: 72 IN

## 2023-01-01 DIAGNOSIS — C61 PROSTATE CANCER METASTATIC TO BONE: ICD-10-CM

## 2023-01-01 DIAGNOSIS — C79.51 PROSTATE CANCER METASTATIC TO BONE: ICD-10-CM

## 2023-01-01 DIAGNOSIS — Z85.71 HISTORY OF HODGKIN'S LYMPHOMA: ICD-10-CM

## 2023-01-01 DIAGNOSIS — C81.10 NODULAR SCLEROSING HODGKIN'S LYMPHOMA, UNSPECIFIED BODY REGION: ICD-10-CM

## 2023-01-01 DIAGNOSIS — C81.10 NODULAR SCLEROSING HODGKIN'S LYMPHOMA, UNSPECIFIED BODY REGION: Primary | ICD-10-CM

## 2023-01-01 DIAGNOSIS — C61 PROSTATE CANCER METASTATIC TO BONE: Primary | ICD-10-CM

## 2023-01-01 DIAGNOSIS — C81.18 NODULAR SCLEROSIS HODGKIN LYMPHOMA OF LYMPH NODES OF MULTIPLE REGIONS: ICD-10-CM

## 2023-01-01 DIAGNOSIS — R97.20 ELEVATED PSA: ICD-10-CM

## 2023-01-01 DIAGNOSIS — C79.51 PROSTATE CANCER METASTATIC TO BONE: Primary | ICD-10-CM

## 2023-01-01 DIAGNOSIS — C61 MALIGNANT NEOPLASM OF PROSTATE: Primary | ICD-10-CM

## 2023-01-01 DIAGNOSIS — R63.0 DECREASE IN APPETITE: ICD-10-CM

## 2023-01-01 DIAGNOSIS — E86.0 DEHYDRATION: Primary | ICD-10-CM

## 2023-01-01 DIAGNOSIS — R07.9 CHEST PAIN: ICD-10-CM

## 2023-01-01 DIAGNOSIS — G62.9 NEUROPATHY: ICD-10-CM

## 2023-01-01 DIAGNOSIS — R11.2 CHEMOTHERAPY INDUCED NAUSEA AND VOMITING: ICD-10-CM

## 2023-01-01 DIAGNOSIS — T45.1X5A CHEMOTHERAPY INDUCED NAUSEA AND VOMITING: ICD-10-CM

## 2023-01-01 DIAGNOSIS — R59.1 LYMPHADENOPATHY: ICD-10-CM

## 2023-01-01 DIAGNOSIS — R63.0 DECREASED APPETITE: ICD-10-CM

## 2023-01-01 DIAGNOSIS — R59.0 SUPRACLAVICULAR LYMPHADENOPATHY: ICD-10-CM

## 2023-01-01 DIAGNOSIS — T45.1X5A CHEMOTHERAPY-INDUCED THROMBOCYTOPENIA: ICD-10-CM

## 2023-01-01 DIAGNOSIS — Z01.818 PREOP TESTING: ICD-10-CM

## 2023-01-01 DIAGNOSIS — C61 MALIGNANT NEOPLASM OF PROSTATE: ICD-10-CM

## 2023-01-01 DIAGNOSIS — D64.9 ANEMIA, UNSPECIFIED TYPE: ICD-10-CM

## 2023-01-01 DIAGNOSIS — D70.1 CHEMOTHERAPY-INDUCED NEUTROPENIA: ICD-10-CM

## 2023-01-01 DIAGNOSIS — R59.1 LYMPHADENOPATHY: Primary | ICD-10-CM

## 2023-01-01 DIAGNOSIS — D69.59 CHEMOTHERAPY-INDUCED THROMBOCYTOPENIA: ICD-10-CM

## 2023-01-01 DIAGNOSIS — E87.1 HYPONATREMIA: ICD-10-CM

## 2023-01-01 DIAGNOSIS — T45.1X5A CHEMOTHERAPY-INDUCED NEUTROPENIA: ICD-10-CM

## 2023-01-01 DIAGNOSIS — D69.6 THROMBOCYTOPENIA, UNSPECIFIED: ICD-10-CM

## 2023-01-01 DIAGNOSIS — E86.0 DEHYDRATION: ICD-10-CM

## 2023-01-01 DIAGNOSIS — R53.83 FATIGUE, UNSPECIFIED TYPE: ICD-10-CM

## 2023-01-01 DIAGNOSIS — R53.83 FATIGUE, UNSPECIFIED TYPE: Primary | ICD-10-CM

## 2023-01-01 DIAGNOSIS — C91.10 CLL (CHRONIC LYMPHOCYTIC LEUKEMIA): Primary | ICD-10-CM

## 2023-01-01 DIAGNOSIS — Z51.11 ENCOUNTER FOR ANTINEOPLASTIC CHEMOTHERAPY: ICD-10-CM

## 2023-01-01 DIAGNOSIS — R00.0 SINUS TACHYCARDIA: ICD-10-CM

## 2023-01-01 DIAGNOSIS — R53.1 WEAKNESS: ICD-10-CM

## 2023-01-01 DIAGNOSIS — D64.9 ANEMIA, UNSPECIFIED TYPE: Primary | ICD-10-CM

## 2023-01-01 LAB
ABO + RH BLD: NORMAL
ALBUMIN SERPL-MCNC: 2.2 G/DL (ref 3.4–4.8)
ALBUMIN SERPL-MCNC: 2.8 G/DL (ref 3.4–4.8)
ALBUMIN SERPL-MCNC: 2.9 G/DL (ref 3.4–4.8)
ALBUMIN SERPL-MCNC: 3.5 G/DL (ref 3.4–4.8)
ALBUMIN SERPL-MCNC: 3.7 G/DL (ref 3.4–4.8)
ALBUMIN/GLOB SERPL: 0.6 RATIO (ref 1.1–2)
ALBUMIN/GLOB SERPL: 0.8 RATIO (ref 1.1–2)
ALBUMIN/GLOB SERPL: 0.9 RATIO (ref 1.1–2)
ALBUMIN/GLOB SERPL: 1.5 RATIO (ref 1.1–2)
ALBUMIN/GLOB SERPL: 1.6 RATIO (ref 1.1–2)
ALP SERPL-CCNC: 179 UNIT/L (ref 40–150)
ALP SERPL-CCNC: 258 UNIT/L (ref 40–150)
ALP SERPL-CCNC: 263 UNIT/L (ref 40–150)
ALP SERPL-CCNC: 305 UNIT/L (ref 40–150)
ALP SERPL-CCNC: 434 UNIT/L (ref 40–150)
ALT SERPL-CCNC: 22 UNIT/L (ref 0–55)
ALT SERPL-CCNC: 24 UNIT/L (ref 0–55)
ALT SERPL-CCNC: 52 UNIT/L (ref 0–55)
ALT SERPL-CCNC: 62 UNIT/L (ref 0–55)
ALT SERPL-CCNC: 62 UNIT/L (ref 0–55)
ANION GAP SERPL CALC-SCNC: 11 MEQ/L
AST SERPL-CCNC: 105 UNIT/L (ref 5–34)
AST SERPL-CCNC: 114 UNIT/L (ref 5–34)
AST SERPL-CCNC: 124 UNIT/L (ref 5–34)
AST SERPL-CCNC: 132 UNIT/L (ref 5–34)
AST SERPL-CCNC: 90 UNIT/L (ref 5–34)
BASOPHILS # BLD AUTO: 0.01 X10(3)/MCL
BASOPHILS # BLD AUTO: 0.03 X10(3)/MCL
BASOPHILS # BLD AUTO: 0.04 X10(3)/MCL (ref 0–0.2)
BASOPHILS # BLD AUTO: 0.05 X10(3)/MCL (ref 0–0.2)
BASOPHILS # BLD AUTO: 0.05 X10(3)/MCL (ref 0–0.2)
BASOPHILS NFR BLD AUTO: 0.1 %
BASOPHILS NFR BLD AUTO: 0.5 %
BASOPHILS NFR BLD AUTO: 0.7 %
BASOPHILS NFR BLD AUTO: 0.7 %
BASOPHILS NFR BLD AUTO: 0.9 %
BILIRUB SERPL-MCNC: 0.5 MG/DL
BILIRUBIN DIRECT+TOT PNL SERPL-MCNC: 0.3 MG/DL
BILIRUBIN DIRECT+TOT PNL SERPL-MCNC: 0.4 MG/DL
BLD PROD TYP BPU: NORMAL
BLOOD UNIT EXPIRATION DATE: NORMAL
BLOOD UNIT TYPE CODE: 600
BLOOD UNIT TYPE CODE: 6200
BLOOD UNIT TYPE CODE: 6200
BUN SERPL-MCNC: 10.4 MG/DL (ref 8.4–25.7)
BUN SERPL-MCNC: 10.6 MG/DL (ref 8.4–25.7)
BUN SERPL-MCNC: 11.3 MG/DL (ref 8.4–25.7)
BUN SERPL-MCNC: 12.2 MG/DL (ref 8.4–25.7)
BUN SERPL-MCNC: 6.1 MG/DL (ref 8.4–25.7)
BUN SERPL-MCNC: 8.8 MG/DL (ref 8.4–25.7)
CALCIUM SERPL-MCNC: 7.8 MG/DL (ref 8.8–10)
CALCIUM SERPL-MCNC: 8.3 MG/DL (ref 8.8–10)
CALCIUM SERPL-MCNC: 8.6 MG/DL (ref 8.8–10)
CALCIUM SERPL-MCNC: 8.8 MG/DL (ref 8.8–10)
CALCIUM SERPL-MCNC: 9.1 MG/DL (ref 8.8–10)
CALCIUM SERPL-MCNC: 9.1 MG/DL (ref 8.8–10)
CHLORIDE SERPL-SCNC: 101 MMOL/L (ref 98–107)
CHLORIDE SERPL-SCNC: 103 MMOL/L (ref 98–107)
CHLORIDE SERPL-SCNC: 105 MMOL/L (ref 98–107)
CHLORIDE SERPL-SCNC: 106 MMOL/L (ref 98–107)
CHLORIDE SERPL-SCNC: 94 MMOL/L (ref 98–107)
CHLORIDE SERPL-SCNC: 99 MMOL/L (ref 98–107)
CO2 SERPL-SCNC: 22 MMOL/L (ref 23–31)
CO2 SERPL-SCNC: 23 MMOL/L (ref 23–31)
CO2 SERPL-SCNC: 25 MMOL/L (ref 23–31)
CO2 SERPL-SCNC: 26 MMOL/L (ref 23–31)
CO2 SERPL-SCNC: 26 MMOL/L (ref 23–31)
CO2 SERPL-SCNC: 27 MMOL/L (ref 23–31)
CREAT SERPL-MCNC: 0.59 MG/DL (ref 0.73–1.18)
CREAT SERPL-MCNC: 0.67 MG/DL (ref 0.73–1.18)
CREAT SERPL-MCNC: 0.74 MG/DL (ref 0.73–1.18)
CREAT SERPL-MCNC: 0.75 MG/DL (ref 0.73–1.18)
CREAT SERPL-MCNC: 0.78 MG/DL (ref 0.73–1.18)
CREAT SERPL-MCNC: 0.82 MG/DL (ref 0.73–1.18)
CREAT/UREA NIT SERPL: 18
CROSSMATCH INTERPRETATION: NORMAL
DISPENSE STATUS: NORMAL
EOSINOPHIL # BLD AUTO: 0.01 X10(3)/MCL (ref 0–0.9)
EOSINOPHIL # BLD AUTO: 0.01 X10(3)/MCL (ref 0–0.9)
EOSINOPHIL # BLD AUTO: 0.02 X10(3)/MCL (ref 0–0.9)
EOSINOPHIL # BLD AUTO: 0.02 X10(3)/MCL (ref 0–0.9)
EOSINOPHIL # BLD AUTO: 0.03 X10(3)/MCL (ref 0–0.9)
EOSINOPHIL # BLD AUTO: 0.03 X10(3)/MCL (ref 0–0.9)
EOSINOPHIL # BLD AUTO: 0.07 X10(3)/MCL (ref 0–0.9)
EOSINOPHIL # BLD AUTO: 0.1 X10(3)/MCL (ref 0–0.9)
EOSINOPHIL NFR BLD AUTO: 0.1 %
EOSINOPHIL NFR BLD AUTO: 0.1 %
EOSINOPHIL NFR BLD AUTO: 0.2 %
EOSINOPHIL NFR BLD AUTO: 0.2 %
EOSINOPHIL NFR BLD AUTO: 0.5 %
EOSINOPHIL NFR BLD AUTO: 0.5 %
EOSINOPHIL NFR BLD AUTO: 1 %
EOSINOPHIL NFR BLD AUTO: 1.7 %
ERYTHROCYTE [DISTWIDTH] IN BLOOD BY AUTOMATED COUNT: 16.9 % (ref 11.5–17)
ERYTHROCYTE [DISTWIDTH] IN BLOOD BY AUTOMATED COUNT: 17.5 % (ref 11.5–17)
ERYTHROCYTE [DISTWIDTH] IN BLOOD BY AUTOMATED COUNT: 17.8 % (ref 11.5–17)
ERYTHROCYTE [DISTWIDTH] IN BLOOD BY AUTOMATED COUNT: 18.1 % (ref 11.5–17)
ERYTHROCYTE [DISTWIDTH] IN BLOOD BY AUTOMATED COUNT: 18.7 % (ref 11.5–17)
ERYTHROCYTE [DISTWIDTH] IN BLOOD BY AUTOMATED COUNT: 19.6 % (ref 11.5–17)
ERYTHROCYTE [DISTWIDTH] IN BLOOD BY AUTOMATED COUNT: 19.6 % (ref 11.5–17)
ERYTHROCYTE [DISTWIDTH] IN BLOOD BY AUTOMATED COUNT: 23.2 % (ref 11.5–17)
FOLATE SERPL-MCNC: 13.3 NG/ML (ref 7–31.4)
FREE/TOTAL PSA (OHS): 8.5 %
FREE/TOTAL PSA (OHS): >10 %
GFR SERPLBLD CREATININE-BSD FMLA CKD-EPI: >60 MLS/MIN/1.73/M2
GLOBULIN SER-MCNC: 2.3 GM/DL (ref 2.4–3.5)
GLOBULIN SER-MCNC: 2.3 GM/DL (ref 2.4–3.5)
GLOBULIN SER-MCNC: 3.4 GM/DL (ref 2.4–3.5)
GLOBULIN SER-MCNC: 3.4 GM/DL (ref 2.4–3.5)
GLOBULIN SER-MCNC: 3.7 GM/DL (ref 2.4–3.5)
GLUCOSE SERPL-MCNC: 106 MG/DL (ref 82–115)
GLUCOSE SERPL-MCNC: 115 MG/DL (ref 82–115)
GLUCOSE SERPL-MCNC: 118 MG/DL (ref 82–115)
GLUCOSE SERPL-MCNC: 118 MG/DL (ref 82–115)
GLUCOSE SERPL-MCNC: 93 MG/DL (ref 82–115)
GLUCOSE SERPL-MCNC: 98 MG/DL (ref 82–115)
GROUP & RH: NORMAL
HCT VFR BLD AUTO: 21.3 % (ref 42–52)
HCT VFR BLD AUTO: 23.9 % (ref 42–52)
HCT VFR BLD AUTO: 25.9 % (ref 42–52)
HCT VFR BLD AUTO: 26 % (ref 42–52)
HCT VFR BLD AUTO: 27.1 % (ref 42–52)
HCT VFR BLD AUTO: 30.6 % (ref 42–52)
HCT VFR BLD AUTO: 32.2 % (ref 42–52)
HCT VFR BLD AUTO: 32.3 % (ref 42–52)
HGB BLD-MCNC: 6.3 G/DL (ref 14–18)
HGB BLD-MCNC: 7.1 G/DL (ref 14–18)
HGB BLD-MCNC: 7.6 G/DL (ref 14–18)
HGB BLD-MCNC: 7.9 G/DL (ref 14–18)
HGB BLD-MCNC: 8.3 G/DL (ref 14–18)
HGB BLD-MCNC: 8.8 G/DL (ref 14–18)
HGB BLD-MCNC: 9.5 G/DL (ref 14–18)
HGB BLD-MCNC: 9.7 G/DL (ref 14–18)
IMM GRANULOCYTES # BLD AUTO: 0.04 X10(3)/MCL (ref 0–0.04)
IMM GRANULOCYTES # BLD AUTO: 0.1 X10(3)/MCL (ref 0–0.04)
IMM GRANULOCYTES # BLD AUTO: 0.13 X10(3)/MCL (ref 0–0.04)
IMM GRANULOCYTES # BLD AUTO: 0.14 X10(3)/MCL (ref 0–0.04)
IMM GRANULOCYTES # BLD AUTO: 0.16 X10(3)/MCL (ref 0–0.04)
IMM GRANULOCYTES # BLD AUTO: 0.24 X10(3)/MCL (ref 0–0.04)
IMM GRANULOCYTES NFR BLD AUTO: 0.7 %
IMM GRANULOCYTES NFR BLD AUTO: 1 %
IMM GRANULOCYTES NFR BLD AUTO: 1.2 %
IMM GRANULOCYTES NFR BLD AUTO: 1.3 %
IMM GRANULOCYTES NFR BLD AUTO: 1.5 %
IMM GRANULOCYTES NFR BLD AUTO: 2.4 %
IMM GRANULOCYTES NFR BLD AUTO: 2.4 %
IMM GRANULOCYTES NFR BLD AUTO: 3.5 %
INDIRECT COOMBS GEL: NORMAL
IRON SATN MFR SERPL: 13 % (ref 20–50)
IRON SERPL-MCNC: 25 UG/DL (ref 65–175)
LYMPHOCYTES # BLD AUTO: 0.4 X10(3)/MCL (ref 0.6–4.6)
LYMPHOCYTES # BLD AUTO: 0.46 X10(3)/MCL (ref 0.6–4.6)
LYMPHOCYTES # BLD AUTO: 0.46 X10(3)/MCL (ref 0.6–4.6)
LYMPHOCYTES # BLD AUTO: 0.5 X10(3)/MCL (ref 0.6–4.6)
LYMPHOCYTES # BLD AUTO: 1.01 X10(3)/MCL (ref 0.6–4.6)
LYMPHOCYTES # BLD AUTO: 1.16 X10(3)/MCL (ref 0.6–4.6)
LYMPHOCYTES # BLD AUTO: 1.21 X10(3)/MCL (ref 0.6–4.6)
LYMPHOCYTES # BLD AUTO: 1.36 X10(3)/MCL (ref 0.6–4.6)
LYMPHOCYTES NFR BLD AUTO: 11.1 %
LYMPHOCYTES NFR BLD AUTO: 11.3 %
LYMPHOCYTES NFR BLD AUTO: 11.8 %
LYMPHOCYTES NFR BLD AUTO: 6.8 %
LYMPHOCYTES NFR BLD AUTO: 6.8 %
LYMPHOCYTES NFR BLD AUTO: 7.8 %
LYMPHOCYTES NFR BLD AUTO: 9.1 %
LYMPHOCYTES NFR BLD AUTO: 9.7 %
MAGNESIUM SERPL-MCNC: 1.6 MG/DL (ref 1.6–2.6)
MAGNESIUM SERPL-MCNC: 1.8 MG/DL (ref 1.6–2.6)
MCH RBC QN AUTO: 25.9 PG (ref 27–31)
MCH RBC QN AUTO: 26.2 PG (ref 27–31)
MCH RBC QN AUTO: 26.4 PG (ref 27–31)
MCH RBC QN AUTO: 26.6 PG
MCH RBC QN AUTO: 26.8 PG (ref 27–31)
MCH RBC QN AUTO: 26.9 PG
MCH RBC QN AUTO: 26.9 PG (ref 27–31)
MCH RBC QN AUTO: 32 PG (ref 27–31)
MCHC RBC AUTO-ENTMCNC: 28.8 G/DL (ref 33–36)
MCHC RBC AUTO-ENTMCNC: 29.3 G/DL (ref 33–36)
MCHC RBC AUTO-ENTMCNC: 29.5 G/DL (ref 33–36)
MCHC RBC AUTO-ENTMCNC: 29.6 G/DL (ref 33–36)
MCHC RBC AUTO-ENTMCNC: 29.7 G/DL (ref 33–36)
MCHC RBC AUTO-ENTMCNC: 30 G/DL (ref 33–36)
MCHC RBC AUTO-ENTMCNC: 30.4 G/DL (ref 33–36)
MCHC RBC AUTO-ENTMCNC: 30.6 G/DL (ref 33–36)
MCV RBC AUTO: 105.3 FL (ref 80–94)
MCV RBC AUTO: 87.4 FL (ref 80–94)
MCV RBC AUTO: 88.2 FL (ref 80–94)
MCV RBC AUTO: 88.4 FL (ref 80–94)
MCV RBC AUTO: 89.1 FL (ref 80–94)
MCV RBC AUTO: 89.5 FL (ref 80–94)
MCV RBC AUTO: 90.2 FL (ref 80–94)
MCV RBC AUTO: 93.6 FL (ref 80–94)
MONOCYTES # BLD AUTO: 0.55 X10(3)/MCL (ref 0.1–1.3)
MONOCYTES # BLD AUTO: 0.56 X10(3)/MCL (ref 0.1–1.3)
MONOCYTES # BLD AUTO: 0.58 X10(3)/MCL (ref 0.1–1.3)
MONOCYTES # BLD AUTO: 0.67 X10(3)/MCL (ref 0.1–1.3)
MONOCYTES # BLD AUTO: 0.86 X10(3)/MCL (ref 0.1–1.3)
MONOCYTES # BLD AUTO: 0.93 X10(3)/MCL (ref 0.1–1.3)
MONOCYTES # BLD AUTO: 0.95 X10(3)/MCL (ref 0.1–1.3)
MONOCYTES # BLD AUTO: 1.01 X10(3)/MCL (ref 0.1–1.3)
MONOCYTES NFR BLD AUTO: 10.1 %
MONOCYTES NFR BLD AUTO: 11.4 %
MONOCYTES NFR BLD AUTO: 8.2 %
MONOCYTES NFR BLD AUTO: 8.6 %
MONOCYTES NFR BLD AUTO: 8.7 %
MONOCYTES NFR BLD AUTO: 8.8 %
MONOCYTES NFR BLD AUTO: 9.2 %
MONOCYTES NFR BLD AUTO: 9.4 %
NEUTROPHILS # BLD AUTO: 4.25 X10(3)/MCL (ref 2.1–9.2)
NEUTROPHILS # BLD AUTO: 4.61 X10(3)/MCL (ref 2.1–9.2)
NEUTROPHILS # BLD AUTO: 4.65 X10(3)/MCL (ref 2.1–9.2)
NEUTROPHILS # BLD AUTO: 5.38 X10(3)/MCL (ref 2.1–9.2)
NEUTROPHILS # BLD AUTO: 8.28 X10(3)/MCL (ref 2.1–9.2)
NEUTROPHILS # BLD AUTO: 8.3 X10(3)/MCL (ref 2.1–9.2)
NEUTROPHILS # BLD AUTO: 8.4 X10(3)/MCL (ref 2.1–9.2)
NEUTROPHILS # BLD AUTO: 9 X10(3)/MCL (ref 2.1–9.2)
NEUTROPHILS NFR BLD AUTO: 77 %
NEUTROPHILS NFR BLD AUTO: 78 %
NEUTROPHILS NFR BLD AUTO: 78.2 %
NEUTROPHILS NFR BLD AUTO: 79 %
NEUTROPHILS NFR BLD AUTO: 79.1 %
NEUTROPHILS NFR BLD AUTO: 79.1 %
NEUTROPHILS NFR BLD AUTO: 79.4 %
NEUTROPHILS NFR BLD AUTO: 79.9 %
PLATELET # BLD AUTO: 105 X10(3)/MCL (ref 130–400)
PLATELET # BLD AUTO: 118 X10(3)/MCL (ref 130–400)
PLATELET # BLD AUTO: 120 X10(3)/MCL (ref 130–400)
PLATELET # BLD AUTO: 120 X10(3)/MCL (ref 130–400)
PLATELET # BLD AUTO: 149 X10(3)/MCL (ref 130–400)
PLATELET # BLD AUTO: 515 X10(3)/MCL (ref 130–400)
PLATELET # BLD AUTO: 534 X10(3)/MCL (ref 130–400)
PLATELET # BLD AUTO: 79 X10(3)/MCL (ref 130–400)
PMV BLD AUTO: 10.1 FL (ref 7.4–10.4)
PMV BLD AUTO: 7.7 FL (ref 7.4–10.4)
PMV BLD AUTO: 7.9 FL (ref 7.4–10.4)
PMV BLD AUTO: 8.1 FL (ref 7.4–10.4)
PMV BLD AUTO: 8.2 FL (ref 7.4–10.4)
PMV BLD AUTO: 9.1 FL (ref 7.4–10.4)
PMV BLD AUTO: 9.7 FL (ref 7.4–10.4)
PMV BLD AUTO: 9.7 FL (ref 7.4–10.4)
POTASSIUM SERPL-SCNC: 3.8 MMOL/L (ref 3.5–5.1)
POTASSIUM SERPL-SCNC: 3.9 MMOL/L (ref 3.5–5.1)
POTASSIUM SERPL-SCNC: 4 MMOL/L (ref 3.5–5.1)
POTASSIUM SERPL-SCNC: 4 MMOL/L (ref 3.5–5.1)
POTASSIUM SERPL-SCNC: 4.2 MMOL/L (ref 3.5–5.1)
POTASSIUM SERPL-SCNC: 4.4 MMOL/L (ref 3.5–5.1)
PROT SERPL-MCNC: 5.8 GM/DL (ref 5.8–7.6)
PROT SERPL-MCNC: 5.9 GM/DL (ref 5.8–7.6)
PROT SERPL-MCNC: 6 GM/DL (ref 5.8–7.6)
PROT SERPL-MCNC: 6.2 GM/DL (ref 5.8–7.6)
PROT SERPL-MCNC: 6.3 GM/DL (ref 5.8–7.6)
PSA FREE MFR SERPL: 8 %
PSA FREE MFR SERPL: >10 %
PSA FREE SERPL-MCNC: 18.86 NG/ML
PSA FREE SERPL-MCNC: >30 NG/ML
PSA SERPL-MCNC: 191.4 NG/ML
PSA SERPL-MCNC: 222.05 NG/ML
PSA SERPL-MCNC: 300.06 NG/ML
PSA SERPL-MCNC: 534.2 NG/ML
PSYCHE PATHOLOGY RESULT: NORMAL
RBC # BLD AUTO: 2.39 X10(6)/MCL (ref 4.7–6.1)
RBC # BLD AUTO: 2.47 X10(6)/MCL (ref 4.7–6.1)
RBC # BLD AUTO: 2.71 X10(6)/MCL (ref 4.7–6.1)
RBC # BLD AUTO: 2.93 X10(6)/MCL (ref 4.7–6.1)
RBC # BLD AUTO: 3.1 X10(6)/MCL (ref 4.7–6.1)
RBC # BLD AUTO: 3.27 X10(6)/MCL (ref 4.7–6.1)
RBC # BLD AUTO: 3.57 X10(6)/MCL (ref 4.7–6.1)
RBC # BLD AUTO: 3.61 X10(6)/MCL (ref 4.7–6.1)
SODIUM SERPL-SCNC: 132 MMOL/L (ref 136–145)
SODIUM SERPL-SCNC: 133 MMOL/L (ref 136–145)
SODIUM SERPL-SCNC: 137 MMOL/L (ref 136–145)
SODIUM SERPL-SCNC: 139 MMOL/L (ref 136–145)
SODIUM SERPL-SCNC: 139 MMOL/L (ref 136–145)
SODIUM SERPL-SCNC: 140 MMOL/L (ref 136–145)
SPECIMEN OUTDATE: NORMAL
TIBC SERPL-MCNC: 161 UG/DL (ref 69–240)
TIBC SERPL-MCNC: 186 UG/DL (ref 250–450)
TRANSFERRIN SERPL-MCNC: 161 MG/DL (ref 163–344)
UNIT NUMBER: NORMAL
VIT B12 SERPL-MCNC: 848 PG/ML (ref 213–816)
WBC # SPEC AUTO: 10.36 X10(3)/MCL (ref 4.5–11.5)
WBC # SPEC AUTO: 10.49 X10(3)/MCL (ref 4.5–11.5)
WBC # SPEC AUTO: 10.73 X10(3)/MCL (ref 4.5–11.5)
WBC # SPEC AUTO: 11.53 X10(3)/MCL (ref 4.5–11.5)
WBC # SPEC AUTO: 5.5 X10(3)/MCL (ref 4.5–11.5)
WBC # SPEC AUTO: 5.8 X10(3)/MCL (ref 4.5–11.5)
WBC # SPEC AUTO: 5.88 X10(3)/MCL (ref 4.5–11.5)
WBC # SPEC AUTO: 6.8 X10(3)/MCL (ref 4.5–11.5)

## 2023-01-01 PROCEDURE — 3074F PR MOST RECENT SYSTOLIC BLOOD PRESSURE < 130 MM HG: ICD-10-PCS | Mod: CPTII,S$GLB,, | Performed by: INTERNAL MEDICINE

## 2023-01-01 PROCEDURE — 25000003 PHARM REV CODE 250: Performed by: EMERGENCY MEDICINE

## 2023-01-01 PROCEDURE — 63600175 PHARM REV CODE 636 W HCPCS: Performed by: INTERNAL MEDICINE

## 2023-01-01 PROCEDURE — 63600175 PHARM REV CODE 636 W HCPCS

## 2023-01-01 PROCEDURE — 3288F FALL RISK ASSESSMENT DOCD: CPT | Mod: CPTII,S$GLB,, | Performed by: NURSE PRACTITIONER

## 2023-01-01 PROCEDURE — 3078F DIAST BP <80 MM HG: CPT | Mod: CPTII,S$GLB,, | Performed by: INTERNAL MEDICINE

## 2023-01-01 PROCEDURE — 3074F SYST BP LT 130 MM HG: CPT | Mod: CPTII,S$GLB,, | Performed by: INTERNAL MEDICINE

## 2023-01-01 PROCEDURE — 3288F PR FALLS RISK ASSESSMENT DOCUMENTED: ICD-10-PCS | Mod: CPTII,S$GLB,, | Performed by: INTERNAL MEDICINE

## 2023-01-01 PROCEDURE — 82607 VITAMIN B-12: CPT | Performed by: STUDENT IN AN ORGANIZED HEALTH CARE EDUCATION/TRAINING PROGRAM

## 2023-01-01 PROCEDURE — 63600175 PHARM REV CODE 636 W HCPCS: Mod: JZ,JG | Performed by: NURSE PRACTITIONER

## 2023-01-01 PROCEDURE — 99999 PR PBB SHADOW E&M-EST. PATIENT-LVL III: ICD-10-PCS | Mod: PBBFAC,,, | Performed by: NURSE PRACTITIONER

## 2023-01-01 PROCEDURE — 3008F PR BODY MASS INDEX (BMI) DOCUMENTED: ICD-10-PCS | Mod: CPTII,S$GLB,, | Performed by: INTERNAL MEDICINE

## 2023-01-01 PROCEDURE — 3288F PR FALLS RISK ASSESSMENT DOCUMENTED: ICD-10-PCS | Mod: CPTII,S$GLB,, | Performed by: NURSE PRACTITIONER

## 2023-01-01 PROCEDURE — 93010 EKG 12-LEAD: ICD-10-PCS | Mod: ,,, | Performed by: INTERNAL MEDICINE

## 2023-01-01 PROCEDURE — 86900 BLOOD TYPING SEROLOGIC ABO: CPT | Performed by: STUDENT IN AN ORGANIZED HEALTH CARE EDUCATION/TRAINING PROGRAM

## 2023-01-01 PROCEDURE — 1126F AMNT PAIN NOTED NONE PRSNT: CPT | Mod: CPTII,S$GLB,, | Performed by: INTERNAL MEDICINE

## 2023-01-01 PROCEDURE — 25000003 PHARM REV CODE 250: Performed by: NURSE ANESTHETIST, CERTIFIED REGISTERED

## 2023-01-01 PROCEDURE — 25000003 PHARM REV CODE 250: Performed by: NURSE PRACTITIONER

## 2023-01-01 PROCEDURE — 36415 COLL VENOUS BLD VENIPUNCTURE: CPT

## 2023-01-01 PROCEDURE — 83550 IRON BINDING TEST: CPT | Performed by: STUDENT IN AN ORGANIZED HEALTH CARE EDUCATION/TRAINING PROGRAM

## 2023-01-01 PROCEDURE — 1101F PR PT FALLS ASSESS DOC 0-1 FALLS W/OUT INJ PAST YR: ICD-10-PCS | Mod: CPTII,S$GLB,, | Performed by: SURGERY

## 2023-01-01 PROCEDURE — 3078F DIAST BP <80 MM HG: CPT | Mod: CPTII,S$GLB,, | Performed by: NURSE PRACTITIONER

## 2023-01-01 PROCEDURE — 1101F PT FALLS ASSESS-DOCD LE1/YR: CPT | Mod: CPTII,S$GLB,, | Performed by: NURSE PRACTITIONER

## 2023-01-01 PROCEDURE — 1160F PR REVIEW ALL MEDS BY PRESCRIBER/CLIN PHARMACIST DOCUMENTED: ICD-10-PCS | Mod: CPTII,S$GLB,, | Performed by: INTERNAL MEDICINE

## 2023-01-01 PROCEDURE — 1101F PT FALLS ASSESS-DOCD LE1/YR: CPT | Mod: CPTII,S$GLB,, | Performed by: SURGERY

## 2023-01-01 PROCEDURE — 36430 TRANSFUSION BLD/BLD COMPNT: CPT

## 2023-01-01 PROCEDURE — 71000033 HC RECOVERY, INTIAL HOUR: Performed by: SURGERY

## 2023-01-01 PROCEDURE — 3078F PR MOST RECENT DIASTOLIC BLOOD PRESSURE < 80 MM HG: ICD-10-PCS | Mod: CPTII,S$GLB,, | Performed by: INTERNAL MEDICINE

## 2023-01-01 PROCEDURE — 85025 COMPLETE CBC W/AUTO DIFF WBC: CPT

## 2023-01-01 PROCEDURE — 96413 CHEMO IV INFUSION 1 HR: CPT

## 2023-01-01 PROCEDURE — 96375 TX/PRO/DX INJ NEW DRUG ADDON: CPT

## 2023-01-01 PROCEDURE — P9016 RBC LEUKOCYTES REDUCED: HCPCS | Performed by: STUDENT IN AN ORGANIZED HEALTH CARE EDUCATION/TRAINING PROGRAM

## 2023-01-01 PROCEDURE — 3078F PR MOST RECENT DIASTOLIC BLOOD PRESSURE < 80 MM HG: ICD-10-PCS | Mod: CPTII,S$GLB,, | Performed by: SURGERY

## 2023-01-01 PROCEDURE — 1125F AMNT PAIN NOTED PAIN PRSNT: CPT | Mod: CPTII,S$GLB,, | Performed by: INTERNAL MEDICINE

## 2023-01-01 PROCEDURE — 1160F PR REVIEW ALL MEDS BY PRESCRIBER/CLIN PHARMACIST DOCUMENTED: ICD-10-PCS | Mod: CPTII,S$GLB,, | Performed by: NURSE PRACTITIONER

## 2023-01-01 PROCEDURE — 3008F BODY MASS INDEX DOCD: CPT | Mod: CPTII,S$GLB,, | Performed by: NURSE PRACTITIONER

## 2023-01-01 PROCEDURE — 96377 APPLICATON ON-BODY INJECTOR: CPT

## 2023-01-01 PROCEDURE — 63600175 PHARM REV CODE 636 W HCPCS: Mod: JG | Performed by: NURSE PRACTITIONER

## 2023-01-01 PROCEDURE — 1159F MED LIST DOCD IN RCRD: CPT | Mod: CPTII,S$GLB,, | Performed by: INTERNAL MEDICINE

## 2023-01-01 PROCEDURE — 86923 COMPATIBILITY TEST ELECTRIC: CPT | Performed by: NURSE PRACTITIONER

## 2023-01-01 PROCEDURE — 1159F PR MEDICATION LIST DOCUMENTED IN MEDICAL RECORD: ICD-10-PCS | Mod: CPTII,S$GLB,, | Performed by: INTERNAL MEDICINE

## 2023-01-01 PROCEDURE — 82746 ASSAY OF FOLIC ACID SERUM: CPT | Performed by: STUDENT IN AN ORGANIZED HEALTH CARE EDUCATION/TRAINING PROGRAM

## 2023-01-01 PROCEDURE — 63600175 PHARM REV CODE 636 W HCPCS: Performed by: NURSE ANESTHETIST, CERTIFIED REGISTERED

## 2023-01-01 PROCEDURE — 88341 IMHCHEM/IMCYTCHM EA ADD ANTB: CPT | Mod: 59

## 2023-01-01 PROCEDURE — 96367 TX/PROPH/DG ADDL SEQ IV INF: CPT

## 2023-01-01 PROCEDURE — 99215 PR OFFICE/OUTPT VISIT, EST, LEVL V, 40-54 MIN: ICD-10-PCS | Mod: S$GLB,,, | Performed by: NURSE PRACTITIONER

## 2023-01-01 PROCEDURE — 1159F MED LIST DOCD IN RCRD: CPT | Mod: CPTII,S$GLB,, | Performed by: NURSE PRACTITIONER

## 2023-01-01 PROCEDURE — 76998 PR  ULTRASONIC GUIDANCE, INTRAOPERATIVE: ICD-10-PCS | Mod: 26,,, | Performed by: SURGERY

## 2023-01-01 PROCEDURE — 1160F RVW MEDS BY RX/DR IN RCRD: CPT | Mod: CPTII,S$GLB,, | Performed by: INTERNAL MEDICINE

## 2023-01-01 PROCEDURE — 99999 PR PBB SHADOW E&M-EST. PATIENT-LVL IV: ICD-10-PCS | Mod: PBBFAC,,, | Performed by: NURSE PRACTITIONER

## 2023-01-01 PROCEDURE — G0378 HOSPITAL OBSERVATION PER HR: HCPCS

## 2023-01-01 PROCEDURE — 99999 PR PBB SHADOW E&M-EST. PATIENT-LVL III: ICD-10-PCS | Mod: PBBFAC,,, | Performed by: SURGERY

## 2023-01-01 PROCEDURE — 1159F PR MEDICATION LIST DOCUMENTED IN MEDICAL RECORD: ICD-10-PCS | Mod: CPTII,S$GLB,, | Performed by: SURGERY

## 2023-01-01 PROCEDURE — 84153 ASSAY OF PSA TOTAL: CPT

## 2023-01-01 PROCEDURE — 99999 PR PBB SHADOW E&M-EST. PATIENT-LVL IV: ICD-10-PCS | Mod: PBBFAC,,, | Performed by: INTERNAL MEDICINE

## 2023-01-01 PROCEDURE — 96376 TX/PRO/DX INJ SAME DRUG ADON: CPT

## 2023-01-01 PROCEDURE — 3078F PR MOST RECENT DIASTOLIC BLOOD PRESSURE < 80 MM HG: ICD-10-PCS | Mod: CPTII,S$GLB,, | Performed by: NURSE PRACTITIONER

## 2023-01-01 PROCEDURE — 63600175 PHARM REV CODE 636 W HCPCS: Performed by: NURSE PRACTITIONER

## 2023-01-01 PROCEDURE — 3008F BODY MASS INDEX DOCD: CPT | Mod: CPTII,S$GLB,, | Performed by: SURGERY

## 2023-01-01 PROCEDURE — 3288F FALL RISK ASSESSMENT DOCD: CPT | Mod: CPTII,S$GLB,, | Performed by: INTERNAL MEDICINE

## 2023-01-01 PROCEDURE — 27201423 OPTIME MED/SURG SUP & DEVICES STERILE SUPPLY: Performed by: SURGERY

## 2023-01-01 PROCEDURE — 63600175 PHARM REV CODE 636 W HCPCS: Performed by: EMERGENCY MEDICINE

## 2023-01-01 PROCEDURE — 80048 BASIC METABOLIC PNL TOTAL CA: CPT | Performed by: STUDENT IN AN ORGANIZED HEALTH CARE EDUCATION/TRAINING PROGRAM

## 2023-01-01 PROCEDURE — 99214 PR OFFICE/OUTPT VISIT, EST, LEVL IV, 30-39 MIN: ICD-10-PCS | Mod: S$GLB,,, | Performed by: INTERNAL MEDICINE

## 2023-01-01 PROCEDURE — 99215 OFFICE O/P EST HI 40 MIN: CPT | Mod: S$GLB,,, | Performed by: NURSE PRACTITIONER

## 2023-01-01 PROCEDURE — 3008F PR BODY MASS INDEX (BMI) DOCUMENTED: ICD-10-PCS | Mod: CPTII,S$GLB,, | Performed by: NURSE PRACTITIONER

## 2023-01-01 PROCEDURE — 99999 PR PBB SHADOW E&M-EST. PATIENT-LVL V: CPT | Mod: PBBFAC,,, | Performed by: INTERNAL MEDICINE

## 2023-01-01 PROCEDURE — 99285 EMERGENCY DEPT VISIT HI MDM: CPT | Mod: 25

## 2023-01-01 PROCEDURE — 1126F PR PAIN SEVERITY QUANTIFIED, NO PAIN PRESENT: ICD-10-PCS | Mod: CPTII,S$GLB,, | Performed by: NURSE PRACTITIONER

## 2023-01-01 PROCEDURE — 3008F BODY MASS INDEX DOCD: CPT | Mod: CPTII,S$GLB,, | Performed by: INTERNAL MEDICINE

## 2023-01-01 PROCEDURE — 3074F PR MOST RECENT SYSTOLIC BLOOD PRESSURE < 130 MM HG: ICD-10-PCS | Mod: CPTII,S$GLB,, | Performed by: NURSE PRACTITIONER

## 2023-01-01 PROCEDURE — 83735 ASSAY OF MAGNESIUM: CPT | Performed by: STUDENT IN AN ORGANIZED HEALTH CARE EDUCATION/TRAINING PROGRAM

## 2023-01-01 PROCEDURE — 37000009 HC ANESTHESIA EA ADD 15 MINS: Performed by: SURGERY

## 2023-01-01 PROCEDURE — 37000008 HC ANESTHESIA 1ST 15 MINUTES: Performed by: SURGERY

## 2023-01-01 PROCEDURE — 1159F PR MEDICATION LIST DOCUMENTED IN MEDICAL RECORD: ICD-10-PCS | Mod: CPTII,S$GLB,, | Performed by: NURSE PRACTITIONER

## 2023-01-01 PROCEDURE — 99999 PR PBB SHADOW E&M-EST. PATIENT-LVL IV: CPT | Mod: PBBFAC,,, | Performed by: NURSE PRACTITIONER

## 2023-01-01 PROCEDURE — 99999 PR PBB SHADOW E&M-EST. PATIENT-LVL IV: CPT | Mod: PBBFAC,,, | Performed by: INTERNAL MEDICINE

## 2023-01-01 PROCEDURE — 85025 COMPLETE CBC W/AUTO DIFF WBC: CPT | Mod: 91 | Performed by: STUDENT IN AN ORGANIZED HEALTH CARE EDUCATION/TRAINING PROGRAM

## 2023-01-01 PROCEDURE — 93005 ELECTROCARDIOGRAM TRACING: CPT

## 2023-01-01 PROCEDURE — 1126F PR PAIN SEVERITY QUANTIFIED, NO PAIN PRESENT: ICD-10-PCS | Mod: CPTII,S$GLB,, | Performed by: INTERNAL MEDICINE

## 2023-01-01 PROCEDURE — 1125F PR PAIN SEVERITY QUANTIFIED, PAIN PRESENT: ICD-10-PCS | Mod: CPTII,S$GLB,, | Performed by: NURSE PRACTITIONER

## 2023-01-01 PROCEDURE — 96365 THER/PROPH/DIAG IV INF INIT: CPT

## 2023-01-01 PROCEDURE — 99999 PR PBB SHADOW E&M-EST. PATIENT-LVL V: ICD-10-PCS | Mod: PBBFAC,,, | Performed by: NURSE PRACTITIONER

## 2023-01-01 PROCEDURE — 3074F SYST BP LT 130 MM HG: CPT | Mod: CPTII,S$GLB,, | Performed by: NURSE PRACTITIONER

## 2023-01-01 PROCEDURE — 80053 COMPREHEN METABOLIC PANEL: CPT

## 2023-01-01 PROCEDURE — 96417 CHEMO IV INFUS EACH ADDL SEQ: CPT

## 2023-01-01 PROCEDURE — 86920 COMPATIBILITY TEST SPIN: CPT | Mod: 59 | Performed by: STUDENT IN AN ORGANIZED HEALTH CARE EDUCATION/TRAINING PROGRAM

## 2023-01-01 PROCEDURE — 1101F PR PT FALLS ASSESS DOC 0-1 FALLS W/OUT INJ PAST YR: ICD-10-PCS | Mod: CPTII,S$GLB,, | Performed by: INTERNAL MEDICINE

## 2023-01-01 PROCEDURE — 88307 TISSUE EXAM BY PATHOLOGIST: CPT | Performed by: SURGERY

## 2023-01-01 PROCEDURE — 1101F PR PT FALLS ASSESS DOC 0-1 FALLS W/OUT INJ PAST YR: ICD-10-PCS | Mod: CPTII,S$GLB,, | Performed by: NURSE PRACTITIONER

## 2023-01-01 PROCEDURE — 1101F PT FALLS ASSESS-DOCD LE1/YR: CPT | Mod: CPTII,S$GLB,, | Performed by: INTERNAL MEDICINE

## 2023-01-01 PROCEDURE — 99214 OFFICE O/P EST MOD 30 MIN: CPT | Mod: S$GLB,,, | Performed by: SURGERY

## 2023-01-01 PROCEDURE — 63600175 PHARM REV CODE 636 W HCPCS: Performed by: STUDENT IN AN ORGANIZED HEALTH CARE EDUCATION/TRAINING PROGRAM

## 2023-01-01 PROCEDURE — 36000707: Performed by: SURGERY

## 2023-01-01 PROCEDURE — 3008F PR BODY MASS INDEX (BMI) DOCUMENTED: ICD-10-PCS | Mod: CPTII,S$GLB,, | Performed by: SURGERY

## 2023-01-01 PROCEDURE — 63600175 PHARM REV CODE 636 W HCPCS: Performed by: ANESTHESIOLOGY

## 2023-01-01 PROCEDURE — 25000003 PHARM REV CODE 250: Performed by: STUDENT IN AN ORGANIZED HEALTH CARE EDUCATION/TRAINING PROGRAM

## 2023-01-01 PROCEDURE — 96366 THER/PROPH/DIAG IV INF ADDON: CPT

## 2023-01-01 PROCEDURE — 96377 APPLICATON ON-BODY INJECTOR: CPT | Mod: 59

## 2023-01-01 PROCEDURE — 99215 PR OFFICE/OUTPT VISIT, EST, LEVL V, 40-54 MIN: ICD-10-PCS | Mod: S$GLB,,, | Performed by: INTERNAL MEDICINE

## 2023-01-01 PROCEDURE — 99214 PR OFFICE/OUTPT VISIT, EST, LEVL IV, 30-39 MIN: ICD-10-PCS | Mod: S$GLB,,, | Performed by: SURGERY

## 2023-01-01 PROCEDURE — 25000003 PHARM REV CODE 250: Performed by: INTERNAL MEDICINE

## 2023-01-01 PROCEDURE — 76998 US GUIDE INTRAOP: CPT | Mod: 26,,, | Performed by: SURGERY

## 2023-01-01 PROCEDURE — 99215 OFFICE O/P EST HI 40 MIN: CPT | Mod: S$GLB,,, | Performed by: INTERNAL MEDICINE

## 2023-01-01 PROCEDURE — 96361 HYDRATE IV INFUSION ADD-ON: CPT

## 2023-01-01 PROCEDURE — 38510 PR BIOPSY/REM LYMPH NODES, CERVICAL: ICD-10-PCS | Mod: LT,,, | Performed by: SURGERY

## 2023-01-01 PROCEDURE — 3074F SYST BP LT 130 MM HG: CPT | Mod: CPTII,S$GLB,, | Performed by: SURGERY

## 2023-01-01 PROCEDURE — 94760 N-INVAS EAR/PLS OXIMETRY 1: CPT

## 2023-01-01 PROCEDURE — 1160F RVW MEDS BY RX/DR IN RCRD: CPT | Mod: CPTII,S$GLB,, | Performed by: NURSE PRACTITIONER

## 2023-01-01 PROCEDURE — 38510 BIOPSY/REMOVAL LYMPH NODES: CPT | Mod: LT,,, | Performed by: SURGERY

## 2023-01-01 PROCEDURE — 99214 OFFICE O/P EST MOD 30 MIN: CPT | Mod: S$GLB,,, | Performed by: INTERNAL MEDICINE

## 2023-01-01 PROCEDURE — 80053 COMPREHEN METABOLIC PANEL: CPT | Performed by: EMERGENCY MEDICINE

## 2023-01-01 PROCEDURE — 99024 PR POST-OP FOLLOW-UP VISIT: ICD-10-PCS | Mod: S$GLB,,, | Performed by: NURSE PRACTITIONER

## 2023-01-01 PROCEDURE — 1125F AMNT PAIN NOTED PAIN PRSNT: CPT | Mod: CPTII,S$GLB,, | Performed by: NURSE PRACTITIONER

## 2023-01-01 PROCEDURE — 99999 PR PBB SHADOW E&M-EST. PATIENT-LVL V: CPT | Mod: PBBFAC,,, | Performed by: NURSE PRACTITIONER

## 2023-01-01 PROCEDURE — 1126F AMNT PAIN NOTED NONE PRSNT: CPT | Mod: CPTII,S$GLB,, | Performed by: NURSE PRACTITIONER

## 2023-01-01 PROCEDURE — 1126F PR PAIN SEVERITY QUANTIFIED, NO PAIN PRESENT: ICD-10-PCS | Mod: CPTII,S$GLB,, | Performed by: SURGERY

## 2023-01-01 PROCEDURE — 1159F MED LIST DOCD IN RCRD: CPT | Mod: CPTII,S$GLB,, | Performed by: SURGERY

## 2023-01-01 PROCEDURE — 3288F PR FALLS RISK ASSESSMENT DOCUMENTED: ICD-10-PCS | Mod: CPTII,S$GLB,, | Performed by: SURGERY

## 2023-01-01 PROCEDURE — 63600175 PHARM REV CODE 636 W HCPCS: Mod: JZ,JG | Performed by: INTERNAL MEDICINE

## 2023-01-01 PROCEDURE — 93010 ELECTROCARDIOGRAM REPORT: CPT | Mod: ,,, | Performed by: INTERNAL MEDICINE

## 2023-01-01 PROCEDURE — 1125F PR PAIN SEVERITY QUANTIFIED, PAIN PRESENT: ICD-10-PCS | Mod: CPTII,S$GLB,, | Performed by: INTERNAL MEDICINE

## 2023-01-01 PROCEDURE — P9040 RBC LEUKOREDUCED IRRADIATED: HCPCS | Performed by: NURSE PRACTITIONER

## 2023-01-01 PROCEDURE — 25000003 PHARM REV CODE 250: Performed by: ANESTHESIOLOGY

## 2023-01-01 PROCEDURE — 3078F DIAST BP <80 MM HG: CPT | Mod: CPTII,S$GLB,, | Performed by: SURGERY

## 2023-01-01 PROCEDURE — 85025 COMPLETE CBC W/AUTO DIFF WBC: CPT | Performed by: STUDENT IN AN ORGANIZED HEALTH CARE EDUCATION/TRAINING PROGRAM

## 2023-01-01 PROCEDURE — 1126F AMNT PAIN NOTED NONE PRSNT: CPT | Mod: CPTII,S$GLB,, | Performed by: SURGERY

## 2023-01-01 PROCEDURE — 99024 POSTOP FOLLOW-UP VISIT: CPT | Mod: S$GLB,,, | Performed by: NURSE PRACTITIONER

## 2023-01-01 PROCEDURE — 99900031 HC PATIENT EDUCATION (STAT)

## 2023-01-01 PROCEDURE — 96360 HYDRATION IV INFUSION INIT: CPT

## 2023-01-01 PROCEDURE — 99999 PR PBB SHADOW E&M-EST. PATIENT-LVL III: CPT | Mod: PBBFAC,,, | Performed by: SURGERY

## 2023-01-01 PROCEDURE — 78815 PET IMAGE W/CT SKULL-THIGH: CPT | Mod: TC,PS

## 2023-01-01 PROCEDURE — 3074F PR MOST RECENT SYSTOLIC BLOOD PRESSURE < 130 MM HG: ICD-10-PCS | Mod: CPTII,S$GLB,, | Performed by: SURGERY

## 2023-01-01 PROCEDURE — 71000015 HC POSTOP RECOV 1ST HR: Performed by: SURGERY

## 2023-01-01 PROCEDURE — 36000706: Performed by: SURGERY

## 2023-01-01 PROCEDURE — 78815 PET IMAGE W/CT SKULL-THIGH: CPT | Mod: TC

## 2023-01-01 PROCEDURE — 88342 IMHCHEM/IMCYTCHM 1ST ANTB: CPT

## 2023-01-01 PROCEDURE — 99999 PR PBB SHADOW E&M-EST. PATIENT-LVL III: CPT | Mod: PBBFAC,,, | Performed by: NURSE PRACTITIONER

## 2023-01-01 PROCEDURE — 99999 PR PBB SHADOW E&M-EST. PATIENT-LVL V: ICD-10-PCS | Mod: PBBFAC,,, | Performed by: INTERNAL MEDICINE

## 2023-01-01 PROCEDURE — 3288F FALL RISK ASSESSMENT DOCD: CPT | Mod: CPTII,S$GLB,, | Performed by: SURGERY

## 2023-01-01 PROCEDURE — 25000003 PHARM REV CODE 250: Performed by: SURGERY

## 2023-01-01 PROCEDURE — 71000016 HC POSTOP RECOV ADDL HR: Performed by: SURGERY

## 2023-01-01 PROCEDURE — 85025 COMPLETE CBC W/AUTO DIFF WBC: CPT | Performed by: EMERGENCY MEDICINE

## 2023-01-01 RX ORDER — ONDANSETRON 4 MG/1
8 TABLET, ORALLY DISINTEGRATING ORAL EVERY 8 HOURS PRN
Status: DISCONTINUED | OUTPATIENT
Start: 2023-01-01 | End: 2023-01-01 | Stop reason: HOSPADM

## 2023-01-01 RX ORDER — SIMETHICONE 80 MG
1 TABLET,CHEWABLE ORAL 4 TIMES DAILY PRN
Status: DISCONTINUED | OUTPATIENT
Start: 2023-01-01 | End: 2023-01-01 | Stop reason: HOSPADM

## 2023-01-01 RX ORDER — HYDROMORPHONE HYDROCHLORIDE 2 MG/ML
0.4 INJECTION, SOLUTION INTRAMUSCULAR; INTRAVENOUS; SUBCUTANEOUS EVERY 5 MIN PRN
Status: DISCONTINUED | OUTPATIENT
Start: 2023-01-01 | End: 2023-01-01 | Stop reason: HOSPADM

## 2023-01-01 RX ORDER — EPHEDRINE SULFATE 50 MG/ML
INJECTION, SOLUTION INTRAVENOUS
Status: DISCONTINUED | OUTPATIENT
Start: 2023-01-01 | End: 2023-01-01

## 2023-01-01 RX ORDER — DIPHENHYDRAMINE HYDROCHLORIDE 50 MG/ML
25 INJECTION INTRAMUSCULAR; INTRAVENOUS
Status: CANCELLED
Start: 2023-01-01

## 2023-01-01 RX ORDER — SODIUM CHLORIDE 0.9 % (FLUSH) 0.9 %
10 SYRINGE (ML) INJECTION
Status: DISCONTINUED | OUTPATIENT
Start: 2023-01-01 | End: 2023-01-01 | Stop reason: HOSPADM

## 2023-01-01 RX ORDER — DIPHENHYDRAMINE HCL 25 MG
25 CAPSULE ORAL ONCE
Status: COMPLETED | OUTPATIENT
Start: 2023-01-01 | End: 2023-01-01

## 2023-01-01 RX ORDER — HEPARIN 100 UNIT/ML
500 SYRINGE INTRAVENOUS
Status: DISCONTINUED | OUTPATIENT
Start: 2023-01-01 | End: 2023-01-01 | Stop reason: HOSPADM

## 2023-01-01 RX ORDER — ENOXAPARIN SODIUM 100 MG/ML
40 INJECTION SUBCUTANEOUS EVERY 24 HOURS
Status: CANCELLED | OUTPATIENT
Start: 2023-01-01

## 2023-01-01 RX ORDER — CEFAZOLIN SODIUM 2 G/50ML
2 SOLUTION INTRAVENOUS
Status: DISCONTINUED | OUTPATIENT
Start: 2023-01-01 | End: 2023-01-01 | Stop reason: HOSPADM

## 2023-01-01 RX ORDER — SODIUM CHLORIDE, SODIUM GLUCONATE, SODIUM ACETATE, POTASSIUM CHLORIDE AND MAGNESIUM CHLORIDE 30; 37; 368; 526; 502 MG/100ML; MG/100ML; MG/100ML; MG/100ML; MG/100ML
INJECTION, SOLUTION INTRAVENOUS CONTINUOUS
Status: ACTIVE | OUTPATIENT
Start: 2023-01-01 | End: 2023-01-01

## 2023-01-01 RX ORDER — HEPARIN 100 UNIT/ML
500 SYRINGE INTRAVENOUS
Status: CANCELLED | OUTPATIENT
Start: 2023-01-01

## 2023-01-01 RX ORDER — DIPHENHYDRAMINE HYDROCHLORIDE 50 MG/ML
25 INJECTION INTRAMUSCULAR; INTRAVENOUS EVERY 6 HOURS PRN
Status: DISCONTINUED | OUTPATIENT
Start: 2023-01-01 | End: 2023-01-01

## 2023-01-01 RX ORDER — DEXAMETHASONE SODIUM PHOSPHATE 4 MG/ML
INJECTION, SOLUTION INTRA-ARTICULAR; INTRALESIONAL; INTRAMUSCULAR; INTRAVENOUS; SOFT TISSUE
Status: DISCONTINUED | OUTPATIENT
Start: 2023-01-01 | End: 2023-01-01

## 2023-01-01 RX ORDER — CEFAZOLIN 2 G/1
INJECTION, POWDER, FOR SOLUTION INTRAMUSCULAR; INTRAVENOUS
Status: DISCONTINUED
Start: 2023-01-01 | End: 2023-01-01 | Stop reason: HOSPADM

## 2023-01-01 RX ORDER — ONDANSETRON HYDROCHLORIDE 2 MG/ML
INJECTION, SOLUTION INTRAMUSCULAR; INTRAVENOUS
Status: DISCONTINUED | OUTPATIENT
Start: 2023-01-01 | End: 2023-01-01

## 2023-01-01 RX ORDER — POLYETHYLENE GLYCOL 3350 17 G/17G
17 POWDER, FOR SOLUTION ORAL DAILY
Status: DISCONTINUED | OUTPATIENT
Start: 2023-01-01 | End: 2023-01-01 | Stop reason: HOSPADM

## 2023-01-01 RX ORDER — SODIUM CHLORIDE 0.9 % (FLUSH) 0.9 %
10 SYRINGE (ML) INJECTION
Status: CANCELLED | OUTPATIENT
Start: 2023-01-01

## 2023-01-01 RX ORDER — MEPERIDINE HYDROCHLORIDE 25 MG/ML
12.5 INJECTION INTRAMUSCULAR; INTRAVENOUS; SUBCUTANEOUS EVERY 10 MIN PRN
Status: ACTIVE | OUTPATIENT
Start: 2023-01-01 | End: 2023-01-01

## 2023-01-01 RX ORDER — LANOLIN ALCOHOL/MO/W.PET/CERES
400 CREAM (GRAM) TOPICAL DAILY
Qty: 30 TABLET | Refills: 0 | Status: SHIPPED | OUTPATIENT
Start: 2023-01-01 | End: 2023-09-24

## 2023-01-01 RX ORDER — IBUPROFEN 200 MG
24 TABLET ORAL
Status: DISCONTINUED | OUTPATIENT
Start: 2023-01-01 | End: 2023-01-01 | Stop reason: HOSPADM

## 2023-01-01 RX ORDER — NALOXONE HCL 0.4 MG/ML
0.02 VIAL (ML) INJECTION
Status: DISCONTINUED | OUTPATIENT
Start: 2023-01-01 | End: 2023-01-01 | Stop reason: HOSPADM

## 2023-01-01 RX ORDER — TAMSULOSIN HYDROCHLORIDE 0.4 MG/1
0.4 CAPSULE ORAL DAILY
Status: DISCONTINUED | OUTPATIENT
Start: 2023-01-01 | End: 2023-01-01 | Stop reason: HOSPADM

## 2023-01-01 RX ORDER — CHOLECALCIFEROL (VITAMIN D3) 25 MCG
1000 TABLET ORAL DAILY
Status: DISCONTINUED | OUTPATIENT
Start: 2023-01-01 | End: 2023-01-01 | Stop reason: HOSPADM

## 2023-01-01 RX ORDER — FAMOTIDINE 10 MG/ML
20 INJECTION INTRAVENOUS
Status: CANCELLED | OUTPATIENT
Start: 2023-01-01 | End: 2023-01-01

## 2023-01-01 RX ORDER — MAG HYDROX/ALUMINUM HYD/SIMETH 200-200-20
30 SUSPENSION, ORAL (FINAL DOSE FORM) ORAL 4 TIMES DAILY PRN
Status: DISCONTINUED | OUTPATIENT
Start: 2023-01-01 | End: 2023-01-01 | Stop reason: HOSPADM

## 2023-01-01 RX ORDER — HYDROCODONE BITARTRATE AND ACETAMINOPHEN 5; 325 MG/1; MG/1
1 TABLET ORAL EVERY 6 HOURS PRN
Qty: 10 TABLET | Refills: 0 | Status: SHIPPED | OUTPATIENT
Start: 2023-01-01 | End: 2023-01-01

## 2023-01-01 RX ORDER — LIDOCAINE HYDROCHLORIDE 10 MG/ML
INJECTION, SOLUTION EPIDURAL; INFILTRATION; INTRACAUDAL; PERINEURAL
Status: DISCONTINUED | OUTPATIENT
Start: 2023-01-01 | End: 2023-01-01

## 2023-01-01 RX ORDER — POLYETHYLENE GLYCOL 3350 17 G/17G
17 POWDER, FOR SOLUTION ORAL 3 TIMES DAILY PRN
Status: DISCONTINUED | OUTPATIENT
Start: 2023-01-01 | End: 2023-01-01 | Stop reason: HOSPADM

## 2023-01-01 RX ORDER — FAMOTIDINE 10 MG/ML
20 INJECTION INTRAVENOUS
Status: COMPLETED | OUTPATIENT
Start: 2023-01-01 | End: 2023-01-01

## 2023-01-01 RX ORDER — HYDROCODONE BITARTRATE AND ACETAMINOPHEN 500; 5 MG/1; MG/1
TABLET ORAL ONCE
Status: COMPLETED | OUTPATIENT
Start: 2023-01-01 | End: 2023-01-01

## 2023-01-01 RX ORDER — ENOXAPARIN SODIUM 100 MG/ML
30 INJECTION SUBCUTANEOUS EVERY 24 HOURS
Status: DISCONTINUED | OUTPATIENT
Start: 2023-01-01 | End: 2023-01-01 | Stop reason: HOSPADM

## 2023-01-01 RX ORDER — ONDANSETRON 2 MG/ML
4 INJECTION INTRAMUSCULAR; INTRAVENOUS DAILY PRN
Status: DISCONTINUED | OUTPATIENT
Start: 2023-01-01 | End: 2023-01-01

## 2023-01-01 RX ORDER — ONDANSETRON HYDROCHLORIDE 8 MG/1
8 TABLET, FILM COATED ORAL EVERY 12 HOURS PRN
Qty: 30 TABLET | Refills: 2 | Status: SHIPPED | OUTPATIENT
Start: 2023-01-01 | End: 2024-07-12

## 2023-01-01 RX ORDER — GLUCAGON 1 MG
1 KIT INJECTION
Status: DISCONTINUED | OUTPATIENT
Start: 2023-01-01 | End: 2023-01-01 | Stop reason: HOSPADM

## 2023-01-01 RX ORDER — CALCIUM CARBONATE 500(1250)
1 TABLET ORAL DAILY
COMMUNITY

## 2023-01-01 RX ORDER — HYDROCODONE BITARTRATE AND ACETAMINOPHEN 5; 325 MG/1; MG/1
1 TABLET ORAL EVERY 6 HOURS PRN
Status: DISCONTINUED | OUTPATIENT
Start: 2023-01-01 | End: 2023-01-01 | Stop reason: HOSPADM

## 2023-01-01 RX ORDER — TALC
9 POWDER (GRAM) TOPICAL NIGHTLY PRN
Status: DISCONTINUED | OUTPATIENT
Start: 2023-01-01 | End: 2023-01-01 | Stop reason: HOSPADM

## 2023-01-01 RX ORDER — ENOXAPARIN SODIUM 100 MG/ML
30 INJECTION SUBCUTANEOUS EVERY 24 HOURS
Status: CANCELLED | OUTPATIENT
Start: 2023-01-01

## 2023-01-01 RX ORDER — CALCIUM CARBONATE 200(500)MG
500 TABLET,CHEWABLE ORAL DAILY
Status: DISCONTINUED | OUTPATIENT
Start: 2023-01-01 | End: 2023-01-01 | Stop reason: HOSPADM

## 2023-01-01 RX ORDER — HYDROMORPHONE HYDROCHLORIDE 2 MG/ML
0.2 INJECTION, SOLUTION INTRAMUSCULAR; INTRAVENOUS; SUBCUTANEOUS EVERY 5 MIN PRN
Status: ACTIVE | OUTPATIENT
Start: 2023-01-01

## 2023-01-01 RX ORDER — HYDROCODONE BITARTRATE AND ACETAMINOPHEN 500; 5 MG/1; MG/1
TABLET ORAL
Status: DISCONTINUED | OUTPATIENT
Start: 2023-01-01 | End: 2023-01-01 | Stop reason: HOSPADM

## 2023-01-01 RX ORDER — IBUPROFEN 200 MG
16 TABLET ORAL
Status: DISCONTINUED | OUTPATIENT
Start: 2023-01-01 | End: 2023-01-01 | Stop reason: HOSPADM

## 2023-01-01 RX ORDER — SODIUM CITRATE AND CITRIC ACID MONOHYDRATE 334; 500 MG/5ML; MG/5ML
30 SOLUTION ORAL ONCE
Status: COMPLETED | OUTPATIENT
Start: 2023-01-01 | End: 2023-01-01

## 2023-01-01 RX ORDER — PROPOFOL 10 MG/ML
VIAL (ML) INTRAVENOUS
Status: DISCONTINUED | OUTPATIENT
Start: 2023-01-01 | End: 2023-01-01

## 2023-01-01 RX ORDER — DIPHENHYDRAMINE HYDROCHLORIDE 50 MG/ML
25 INJECTION INTRAMUSCULAR; INTRAVENOUS
Status: COMPLETED | OUTPATIENT
Start: 2023-01-01 | End: 2023-01-01

## 2023-01-01 RX ORDER — IPRATROPIUM BROMIDE AND ALBUTEROL SULFATE 2.5; .5 MG/3ML; MG/3ML
3 SOLUTION RESPIRATORY (INHALATION) EVERY 6 HOURS PRN
Status: DISCONTINUED | OUTPATIENT
Start: 2023-01-01 | End: 2023-01-01 | Stop reason: HOSPADM

## 2023-01-01 RX ORDER — ACETAMINOPHEN 325 MG/1
650 TABLET ORAL ONCE
Status: COMPLETED | OUTPATIENT
Start: 2023-01-01 | End: 2023-01-01

## 2023-01-01 RX ORDER — MORPHINE SULFATE 4 MG/ML
2 INJECTION, SOLUTION INTRAMUSCULAR; INTRAVENOUS EVERY 6 HOURS PRN
Status: DISCONTINUED | OUTPATIENT
Start: 2023-01-01 | End: 2023-01-01 | Stop reason: HOSPADM

## 2023-01-01 RX ORDER — ACETAMINOPHEN 325 MG/1
650 TABLET ORAL ONCE
Status: CANCELLED | OUTPATIENT
Start: 2023-01-01 | End: 2023-01-01

## 2023-01-01 RX ORDER — HYDROMORPHONE HYDROCHLORIDE 2 MG/ML
0.2 INJECTION, SOLUTION INTRAMUSCULAR; INTRAVENOUS; SUBCUTANEOUS EVERY 5 MIN PRN
Status: DISCONTINUED | OUTPATIENT
Start: 2023-01-01 | End: 2023-01-01

## 2023-01-01 RX ORDER — DIPHENHYDRAMINE HCL 25 MG
25 CAPSULE ORAL ONCE
Status: CANCELLED | OUTPATIENT
Start: 2023-01-01 | End: 2023-01-01

## 2023-01-01 RX ORDER — BISACODYL 10 MG
10 SUPPOSITORY, RECTAL RECTAL DAILY PRN
Status: DISCONTINUED | OUTPATIENT
Start: 2023-01-01 | End: 2023-01-01 | Stop reason: HOSPADM

## 2023-01-01 RX ORDER — HYDROMORPHONE HYDROCHLORIDE 2 MG/ML
0.5 INJECTION, SOLUTION INTRAMUSCULAR; INTRAVENOUS; SUBCUTANEOUS EVERY 6 HOURS PRN
Status: DISCONTINUED | OUTPATIENT
Start: 2023-01-01 | End: 2023-01-01 | Stop reason: HOSPADM

## 2023-01-01 RX ORDER — MIDAZOLAM HYDROCHLORIDE 1 MG/ML
2 INJECTION INTRAMUSCULAR; INTRAVENOUS
Status: DISPENSED | OUTPATIENT
Start: 2023-01-01 | End: 2023-01-01

## 2023-01-01 RX ORDER — MORPHINE SULFATE 4 MG/ML
2 INJECTION, SOLUTION INTRAMUSCULAR; INTRAVENOUS
Status: COMPLETED | OUTPATIENT
Start: 2023-01-01 | End: 2023-01-01

## 2023-01-01 RX ORDER — PROCHLORPERAZINE EDISYLATE 5 MG/ML
5 INJECTION INTRAMUSCULAR; INTRAVENOUS EVERY 30 MIN PRN
Status: DISCONTINUED | OUTPATIENT
Start: 2023-01-01 | End: 2023-01-01

## 2023-01-01 RX ORDER — SODIUM CHLORIDE 9 MG/ML
INJECTION, SOLUTION INTRAVENOUS CONTINUOUS
Status: DISCONTINUED | OUTPATIENT
Start: 2023-01-01 | End: 2023-01-01

## 2023-01-01 RX ORDER — HYDROCODONE BITARTRATE AND ACETAMINOPHEN 500; 5 MG/1; MG/1
TABLET ORAL ONCE
Status: CANCELLED | OUTPATIENT
Start: 2023-01-01 | End: 2023-01-01

## 2023-01-01 RX ORDER — BUPIVACAINE HYDROCHLORIDE 5 MG/ML
INJECTION, SOLUTION EPIDURAL; INTRACAUDAL
Status: DISCONTINUED | OUTPATIENT
Start: 2023-01-01 | End: 2023-01-01 | Stop reason: HOSPADM

## 2023-01-01 RX ORDER — ONDANSETRON 2 MG/ML
4 INJECTION INTRAMUSCULAR; INTRAVENOUS EVERY 8 HOURS PRN
Status: DISCONTINUED | OUTPATIENT
Start: 2023-01-01 | End: 2023-01-01 | Stop reason: HOSPADM

## 2023-01-01 RX ORDER — SODIUM CHLORIDE 9 MG/ML
INJECTION, SOLUTION INTRAVENOUS
Status: DISCONTINUED | OUTPATIENT
Start: 2023-01-01 | End: 2023-01-01 | Stop reason: HOSPADM

## 2023-01-01 RX ORDER — ACETAMINOPHEN 500 MG
1000 TABLET ORAL ONCE
Status: COMPLETED | OUTPATIENT
Start: 2023-01-01 | End: 2023-01-01

## 2023-01-01 RX ORDER — PHENYLEPHRINE HYDROCHLORIDE 10 MG/ML
INJECTION INTRAVENOUS
Status: DISCONTINUED | OUTPATIENT
Start: 2023-01-01 | End: 2023-01-01

## 2023-01-01 RX ORDER — MAGNESIUM SULFATE HEPTAHYDRATE 40 MG/ML
2 INJECTION, SOLUTION INTRAVENOUS ONCE
Status: COMPLETED | OUTPATIENT
Start: 2023-01-01 | End: 2023-01-01

## 2023-01-01 RX ORDER — MULTIVIT WITH IRON,MINERALS
100 TABLET ORAL DAILY
Status: DISCONTINUED | OUTPATIENT
Start: 2023-01-01 | End: 2023-01-01 | Stop reason: HOSPADM

## 2023-01-01 RX ORDER — ACETAMINOPHEN 325 MG/1
650 TABLET ORAL EVERY 4 HOURS PRN
Status: DISCONTINUED | OUTPATIENT
Start: 2023-01-01 | End: 2023-01-01 | Stop reason: HOSPADM

## 2023-01-01 RX ORDER — ENOXAPARIN SODIUM 100 MG/ML
INJECTION SUBCUTANEOUS
Status: COMPLETED
Start: 2023-01-01 | End: 2023-01-01

## 2023-01-01 RX ORDER — METHOCARBAMOL 100 MG/ML
1000 INJECTION, SOLUTION INTRAMUSCULAR; INTRAVENOUS ONCE
Status: DISCONTINUED | OUTPATIENT
Start: 2023-01-01 | End: 2023-01-01 | Stop reason: HOSPADM

## 2023-01-01 RX ORDER — FENTANYL CITRATE 50 UG/ML
INJECTION, SOLUTION INTRAMUSCULAR; INTRAVENOUS
Status: DISCONTINUED | OUTPATIENT
Start: 2023-01-01 | End: 2023-01-01

## 2023-01-01 RX ORDER — ONDANSETRON 4 MG/1
8 TABLET, ORALLY DISINTEGRATING ORAL EVERY 12 HOURS PRN
Status: DISCONTINUED | OUTPATIENT
Start: 2023-01-01 | End: 2023-01-01

## 2023-01-01 RX ORDER — BUPIVACAINE HYDROCHLORIDE 5 MG/ML
INJECTION, SOLUTION EPIDURAL; INTRACAUDAL
Status: DISCONTINUED
Start: 2023-01-01 | End: 2023-01-01 | Stop reason: HOSPADM

## 2023-01-01 RX ORDER — LIDOCAINE HYDROCHLORIDE 10 MG/ML
1 INJECTION, SOLUTION EPIDURAL; INFILTRATION; INTRACAUDAL; PERINEURAL ONCE
Status: ACTIVE | OUTPATIENT
Start: 2023-01-01

## 2023-01-01 RX ORDER — ENZALUTAMIDE 40 MG/1
160 TABLET ORAL DAILY
Qty: 120 TABLET | Refills: 11 | Status: SHIPPED | OUTPATIENT
Start: 2023-01-01

## 2023-01-01 RX ORDER — LANOLIN ALCOHOL/MO/W.PET/CERES
400 CREAM (GRAM) TOPICAL DAILY
Status: DISCONTINUED | OUTPATIENT
Start: 2023-01-01 | End: 2023-01-01 | Stop reason: HOSPADM

## 2023-01-01 RX ORDER — HYDROCODONE BITARTRATE AND ACETAMINOPHEN 7.5; 325 MG/1; MG/1
1 TABLET ORAL EVERY 6 HOURS PRN
Qty: 120 TABLET | Refills: 0 | Status: SHIPPED | OUTPATIENT
Start: 2023-01-01

## 2023-01-01 RX ADMIN — SODIUM CHLORIDE 41 MG: 0.9 INJECTION, SOLUTION INTRAVENOUS at 03:03

## 2023-01-01 RX ADMIN — PALONOSETRON 0.25 MG: 0.25 INJECTION, SOLUTION INTRAVENOUS at 02:01

## 2023-01-01 RX ADMIN — SODIUM CHLORIDE: 9 INJECTION, SOLUTION INTRAVENOUS at 09:08

## 2023-01-01 RX ADMIN — LIDOCAINE HYDROCHLORIDE 20 MG: 10 INJECTION, SOLUTION EPIDURAL; INFILTRATION; INTRACAUDAL; PERINEURAL at 12:03

## 2023-01-01 RX ADMIN — SODIUM CHLORIDE: 9 INJECTION, SOLUTION INTRAVENOUS at 11:07

## 2023-01-01 RX ADMIN — SODIUM CHLORIDE 125 MG: 9 INJECTION, SOLUTION INTRAVENOUS at 02:08

## 2023-01-01 RX ADMIN — MORPHINE SULFATE 2 MG: 4 INJECTION, SOLUTION INTRAMUSCULAR; INTRAVENOUS at 12:08

## 2023-01-01 RX ADMIN — FAMOTIDINE 20 MG: 10 INJECTION INTRAVENOUS at 01:05

## 2023-01-01 RX ADMIN — Medication 1000 UNITS: at 08:08

## 2023-01-01 RX ADMIN — SODIUM CHLORIDE: 9 INJECTION, SOLUTION INTRAVENOUS at 01:08

## 2023-01-01 RX ADMIN — HYDROMORPHONE HYDROCHLORIDE 0.5 MG: 2 INJECTION INTRAMUSCULAR; INTRAVENOUS; SUBCUTANEOUS at 07:08

## 2023-01-01 RX ADMIN — Medication 500 UNITS: at 03:05

## 2023-01-01 RX ADMIN — SODIUM CITRATE AND CITRIC ACID MONOHYDRATE 30 ML: 500; 334 SOLUTION ORAL at 10:03

## 2023-01-01 RX ADMIN — PHENYLEPHRINE HYDROCHLORIDE 100 MCG: 10 INJECTION INTRAVENOUS at 12:03

## 2023-01-01 RX ADMIN — FENTANYL CITRATE 25 MCG: 50 INJECTION, SOLUTION INTRAMUSCULAR; INTRAVENOUS at 12:03

## 2023-01-01 RX ADMIN — TAMSULOSIN HYDROCHLORIDE 0.4 MG: 0.4 CAPSULE ORAL at 09:08

## 2023-01-01 RX ADMIN — HEPARIN 500 UNITS: 100 SYRINGE at 04:08

## 2023-01-01 RX ADMIN — HYDROMORPHONE HYDROCHLORIDE 0.5 MG: 2 INJECTION INTRAMUSCULAR; INTRAVENOUS; SUBCUTANEOUS at 05:08

## 2023-01-01 RX ADMIN — PEGFILGRASTIM 6 MG: KIT SUBCUTANEOUS at 03:05

## 2023-01-01 RX ADMIN — HYDROMORPHONE HYDROCHLORIDE 0.5 MG: 2 INJECTION INTRAMUSCULAR; INTRAVENOUS; SUBCUTANEOUS at 11:08

## 2023-01-01 RX ADMIN — PALONOSETRON 0.25 MG: 0.25 INJECTION, SOLUTION INTRAVENOUS at 10:05

## 2023-01-01 RX ADMIN — CEFAZOLIN 2 G: 1 INJECTION, POWDER, FOR SOLUTION INTRAMUSCULAR; INTRAVENOUS at 11:03

## 2023-01-01 RX ADMIN — EPHEDRINE SULFATE 10 MG: 50 INJECTION INTRAVENOUS at 12:03

## 2023-01-01 RX ADMIN — FOSAPREPITANT DIMEGLUMINE 150 MG: 150 INJECTION, POWDER, LYOPHILIZED, FOR SOLUTION INTRAVENOUS at 10:05

## 2023-01-01 RX ADMIN — PEGFILGRASTIM 6 MG: KIT SUBCUTANEOUS at 04:03

## 2023-01-01 RX ADMIN — FAMOTIDINE 20 MG: 10 INJECTION, SOLUTION INTRAVENOUS at 10:05

## 2023-01-01 RX ADMIN — TAMSULOSIN HYDROCHLORIDE 0.4 MG: 0.4 CAPSULE ORAL at 08:08

## 2023-01-01 RX ADMIN — ENOXAPARIN SODIUM 30 MG: 30 INJECTION SUBCUTANEOUS at 10:03

## 2023-01-01 RX ADMIN — ACETAMINOPHEN 1000 MG: 500 TABLET, FILM COATED ORAL at 10:03

## 2023-01-01 RX ADMIN — MAGNESIUM SULFATE IN WATER 2 G: 40 INJECTION, SOLUTION INTRAVENOUS at 12:08

## 2023-01-01 RX ADMIN — HYDROMORPHONE HYDROCHLORIDE 0.5 MG: 2 INJECTION INTRAMUSCULAR; INTRAVENOUS; SUBCUTANEOUS at 01:08

## 2023-01-01 RX ADMIN — FOLIC ACID 500 ML/HR: 5 INJECTION, SOLUTION INTRAMUSCULAR; INTRAVENOUS; SUBCUTANEOUS at 02:08

## 2023-01-01 RX ADMIN — FAMOTIDINE 20 MG: 10 INJECTION, SOLUTION INTRAVENOUS at 01:04

## 2023-01-01 RX ADMIN — FOSAPREPITANT DIMEGLUMINE 150 MG: 150 INJECTION, POWDER, LYOPHILIZED, FOR SOLUTION INTRAVENOUS at 02:01

## 2023-01-01 RX ADMIN — SODIUM CHLORIDE 41 MG: 0.9 INJECTION, SOLUTION INTRAVENOUS at 11:05

## 2023-01-01 RX ADMIN — FOSAPREPITANT DIMEGLUMINE 150 MG: 150 INJECTION, POWDER, LYOPHILIZED, FOR SOLUTION INTRAVENOUS at 01:04

## 2023-01-01 RX ADMIN — PEGFILGRASTIM 6 MG: KIT SUBCUTANEOUS at 03:04

## 2023-01-01 RX ADMIN — SODIUM CHLORIDE, POTASSIUM CHLORIDE, SODIUM LACTATE AND CALCIUM CHLORIDE: 600; 310; 30; 20 INJECTION, SOLUTION INTRAVENOUS at 11:03

## 2023-01-01 RX ADMIN — FOSAPREPITANT DIMEGLUMINE 150 MG: 150 INJECTION, POWDER, LYOPHILIZED, FOR SOLUTION INTRAVENOUS at 01:03

## 2023-01-01 RX ADMIN — DEXAMETHASONE SODIUM PHOSPHATE 4 MG: 4 INJECTION, SOLUTION INTRA-ARTICULAR; INTRALESIONAL; INTRAMUSCULAR; INTRAVENOUS; SOFT TISSUE at 12:03

## 2023-01-01 RX ADMIN — SODIUM CHLORIDE 1000 ML: 9 INJECTION, SOLUTION INTRAVENOUS at 12:08

## 2023-01-01 RX ADMIN — HYDROCODONE BITARTRATE AND ACETAMINOPHEN 1 TABLET: 5; 325 TABLET ORAL at 11:08

## 2023-01-01 RX ADMIN — DIPHENHYDRAMINE HYDROCHLORIDE 25 MG: 50 INJECTION, SOLUTION INTRAMUSCULAR; INTRAVENOUS at 01:04

## 2023-01-01 RX ADMIN — POLYETHYLENE GLYCOL 3350 17 G: 17 POWDER, FOR SOLUTION ORAL at 08:08

## 2023-01-01 RX ADMIN — ACETAMINOPHEN 650 MG: 325 TABLET ORAL at 11:07

## 2023-01-01 RX ADMIN — CALCIUM CARBONATE (ANTACID) CHEW TAB 500 MG 500 MG: 500 CHEW TAB at 09:08

## 2023-01-01 RX ADMIN — Medication 1000 UNITS: at 09:08

## 2023-01-01 RX ADMIN — HEPARIN 500 UNITS: 100 SYRINGE at 04:01

## 2023-01-01 RX ADMIN — ENOXAPARIN SODIUM 30 MG: 100 INJECTION SUBCUTANEOUS at 10:03

## 2023-01-01 RX ADMIN — CARBOPLATIN 545 MG: 10 INJECTION, SOLUTION INTRAVENOUS at 04:03

## 2023-01-01 RX ADMIN — DIPHENHYDRAMINE HYDROCHLORIDE 25 MG: 50 INJECTION INTRAMUSCULAR; INTRAVENOUS at 01:03

## 2023-01-01 RX ADMIN — CALCIUM CARBONATE (ANTACID) CHEW TAB 500 MG 500 MG: 500 CHEW TAB at 08:08

## 2023-01-01 RX ADMIN — DIPHENHYDRAMINE HYDROCHLORIDE 25 MG: 25 CAPSULE ORAL at 11:07

## 2023-01-01 RX ADMIN — ONDANSETRON 4 MG: 2 INJECTION INTRAMUSCULAR; INTRAVENOUS at 12:03

## 2023-01-01 RX ADMIN — SODIUM CHLORIDE 41 MG: 0.9 INJECTION, SOLUTION INTRAVENOUS at 02:04

## 2023-01-01 RX ADMIN — PALONOSETRON 0.25 MG: 0.25 INJECTION, SOLUTION INTRAVENOUS at 01:03

## 2023-01-01 RX ADMIN — SODIUM CHLORIDE: 9 INJECTION, SOLUTION INTRAVENOUS at 04:08

## 2023-01-01 RX ADMIN — DIPHENHYDRAMINE HYDROCHLORIDE 25 MG: 50 INJECTION INTRAMUSCULAR; INTRAVENOUS at 10:05

## 2023-01-01 RX ADMIN — POLYETHYLENE GLYCOL 3350 17 G: 17 POWDER, FOR SOLUTION ORAL at 09:08

## 2023-01-01 RX ADMIN — PEGFILGRASTIM 6 MG: KIT SUBCUTANEOUS at 12:05

## 2023-01-01 RX ADMIN — MIDAZOLAM HYDROCHLORIDE 2 MG: 1 INJECTION, SOLUTION INTRAMUSCULAR; INTRAVENOUS at 11:03

## 2023-01-01 RX ADMIN — SODIUM CHLORIDE: 9 INJECTION, SOLUTION INTRAVENOUS at 01:03

## 2023-01-01 RX ADMIN — PALONOSETRON 0.25 MG: 0.25 INJECTION, SOLUTION INTRAVENOUS at 01:05

## 2023-01-01 RX ADMIN — Medication 400 MG: at 09:08

## 2023-01-01 RX ADMIN — MAGNESIUM SULFATE IN WATER 2 G: 40 INJECTION, SOLUTION INTRAVENOUS at 07:08

## 2023-01-01 RX ADMIN — PALONOSETRON 0.25 MG: 0.25 INJECTION, SOLUTION INTRAVENOUS at 01:04

## 2023-01-01 RX ADMIN — SODIUM CHLORIDE: 9 INJECTION, SOLUTION INTRAVENOUS at 02:08

## 2023-01-01 RX ADMIN — DOCETAXEL 160 MG: 20 INJECTION, SOLUTION INTRAVENOUS at 03:01

## 2023-01-01 RX ADMIN — CARBOPLATIN 540 MG: 10 INJECTION, SOLUTION INTRAVENOUS at 03:04

## 2023-01-01 RX ADMIN — PROPOFOL 60 MG: 10 INJECTION, EMULSION INTRAVENOUS at 12:03

## 2023-01-01 RX ADMIN — SODIUM CHLORIDE 41 MG: 0.9 INJECTION, SOLUTION INTRAVENOUS at 02:05

## 2023-01-01 RX ADMIN — SODIUM CHLORIDE 125 MG: 9 INJECTION, SOLUTION INTRAVENOUS at 09:08

## 2023-01-01 RX ADMIN — HEPARIN 500 UNITS: 100 SYRINGE at 05:03

## 2023-01-01 RX ADMIN — DIPHENHYDRAMINE HYDROCHLORIDE 25 MG: 50 INJECTION INTRAMUSCULAR; INTRAVENOUS at 01:05

## 2023-01-01 RX ADMIN — Medication 100 MG: at 10:08

## 2023-01-01 RX ADMIN — Medication 100 MG: at 09:08

## 2023-01-01 RX ADMIN — FOSAPREPITANT DIMEGLUMINE 150 MG: 150 INJECTION, POWDER, LYOPHILIZED, FOR SOLUTION INTRAVENOUS at 02:05

## 2023-01-01 RX ADMIN — FAMOTIDINE 20 MG: 10 INJECTION, SOLUTION INTRAVENOUS at 01:03

## 2023-01-11 NOTE — PROGRESS NOTES
Subjective:       Patient ID: Konstantin Arroyo is a 67 y.o. male.    Chief Complaint: Follow-up (Patient stated no new problems )      Referring Physician:  Hugh Neri MD  PCP:  Tory Batista MD  Urology: Pee Knox MD  Radiation Oncology: Tommy Callaway MD        Diagnosis:  1.  Small lymphocytic lymphoma, Lugano stage III  2.  [place] 9/10 prostate cancer in 12/12 core biopsies  3.  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    Current Treatment:   1.  Due to transformation of SLL to Hodgkin lymphoma, we started ABVD--- Cycle #1 on 9/8/2021.  Bleomycin dropped with cycle 4, cycle 6-day 15 given on 2/10/2022.  2.  Prostate cancer per urology and radiation oncology.    Treatment History:  1.  SLL -- progressive lymphadenopathy on repeat PET 3/4/2021-- Started treatment with Imbruvica 420mg PO daily shortly after 5/25/2021.    HPI:  Patient who noticed a lump in his neck in April 2020.  He presented to his PCP who ordered an ultrasound of the neck.  Ultrasound on 4/22/2020 showed prominent lymphadenopathy involving the supraclavicular regions bilaterally and bilateral cervical chains.  This is concerning for lymphoma or metastatic disease.    He was seen by Dr. Hugh Neri on 4/29/2020 and plan was made for excisional biopsy of the lymph node.  Excisional biopsy was done on 4/30/2020 and pathology revealed small lymphocytic lymphoma, CD5 positive, CD20 positive, and CD23 positive.  Cyclin D1 was negative.  FISH prognostic panel on the lymph node biopsy revealed a p53 deletion (17p13), a mono allelic deletion of [id number] (13q14), and a deletion of SAMMI (11q22.3).  He was referred to medical oncology for further recommendations.    He presented initially to medical oncology on 5/18/2020.  CT scans of the neck, chest, abdomen, and pelvis were ordered at that time showed lymphadenopathy in the neck, chest, abdomen, and pelvis with mild  hepatosplenomegaly.    Of note, due to an elevated PSA and total protein on 6/24/2020, the patient had protein electrophoresis ordered on his serum which was done on 6/25/2020 and revealed an M spike of 0.7.  Corresponding immunofixation showed an unremarkable pattern with no evidence of monoclonal protein apparent.  Patient was seen by Dr. Pee Knox with urology and underwent a prostate biopsy on 9/14/2020.  Pathology revealed adenocarcinoma, [place] score 9/10 in 12/12 cores.  Per patient report, he had MRI and bone scan at [facility], bone scan was negative and MRI showed pelvic lymphadenopathy.    Repeat scans on 10/06/2020 showed increase in size of some of the lymph nodes.    Patient started on radiation for prostate cancer on 12/14/2020.      PET/CT scan on 3/4/2021 showed hypermetabolic lymphadenopathy in the neck, chest, and abdomen, some of which have increased in size most significantly in the chest.  There was interval decrease in size of pelvic lymphadenopathy.  He started on abiraterone and prednisone through Dr. Papito Callaway with radiation oncology on 5/7/2021.  Ibrutinib ordered on 5/25/2021 and started shortly after.    PET/CT scan on 8/4/2021 showed disease progression most notable in the chest and abdomen with Deauville score of 5.  Left axillary lymph node biopsy on 8/18/2021 showed transformation SLL to Hodgkin lymphoma    Baseline ECHO on 9/7/2021 showed EF of 60-65%.  Baseline PFT on 9/7/2021 normal.  We started ABVD on 9/8/2021.  She received ABVD for 3 cycles, dropped the bleomycin with cycle 4. Patient completed chemotherapy on 02/10/2022.    End of treatment PET/CT scan on 02/28/2022 revealed complete response, Deauville score 2-3.  Recent PET/CT scan done at [facility] on 06/08/2022 revealed stable to interval increased size of multiple lymph nodes above and below the diaphragm that remain mildly avid for FDG with resolution of mild diffuse bone marrow uptake.  There was a new focal uptake  associated with sclerotic changes near the superior endplate of L3.  PSMA PET/CT on 08/04/2022 showed retrocaval and right common iliac lymph nodes with moderate to intense uptake consistent with metastatic prostate cancer.  There are multiple additional lymph nodes above and below the diaphragm that have not significantly changed.  There was a sclerotic lesion at L3 consistent with metastatic disease and no other suspicious uptake.     PET/CT scan done on 12/2/2022 (compared to PET/CT from 08/04/2021) showed improved size and hypermetabolism of reference lymph nodes with skeletal hypermetabolism.    Interval History:  Patient presents to clinic for scheduled follow up appointment.  He remains on treatment with docetaxel and is tolerating treatment fairly well.  Overall he is doing well.  He does have neuropathy in his hands and feet but this remains relatively stable, no pain. He denies any new complaints.    Past Medical History:   Diagnosis Date    Anemia     Colon polyp 12/05/2014    colonoscopy    Diffuse well-differentiated lymphocytic lymphoma     Dyslipidemia     GERD (gastroesophageal reflux disease)     Hodgkin lymphoma     Hypercalcemia     Hypertension     Neuropathy       Past Surgical History:   Procedure Laterality Date    BONE MARROW ASPIRATION  12/2022    COLONOSCOPY W/ POLYPECTOMY  12/05/2014    ENDOSCOPIC CARPAL TUNNEL RELEASE Left 05/05/2021    ENDOSCOPIC CARPAL TUNNEL RELEASE Right 06/27/2019    INGUINAL HERNIA REPAIR Left     KNEE SURGERY Left     MEDIPORT INSERTION, SINGLE  09/08/2021    skin cancer removal on head  02/09/2022    skin cancer removal on right leg  01/26/2022     Social History     Socioeconomic History    Marital status: Single   Tobacco Use    Smoking status: Former     Types: Cigarettes    Smokeless tobacco: Never    Tobacco comments:     stopped at age 16   Substance and Sexual Activity    Alcohol use: Yes     Comment: 1-2 times per month    Drug use: Never      Family  History   Problem Relation Age of Onset    Alzheimer's disease Mother     Arthritis Mother     Hypertension Mother     Cataracts Mother     Heart attack Father     Atrial fibrillation Brother       Review of patient's allergies indicates:  No Known Allergies   Review of Systems   Constitutional:  Negative for chills, diaphoresis, fatigue, fever and unexpected weight change.   HENT:  Negative for nasal congestion, mouth sores, sinus pressure/congestion and sore throat.    Eyes:  Negative for pain and visual disturbance.   Respiratory:  Negative for cough, chest tightness and shortness of breath.    Cardiovascular:  Negative for chest pain, palpitations and leg swelling.   Gastrointestinal:  Negative for abdominal distention, abdominal pain, blood in stool, constipation and diarrhea.   Genitourinary:  Negative for dysuria, frequency and hematuria.   Musculoskeletal:  Negative for arthralgias and back pain.   Integumentary:  Negative for rash.   Neurological:  Negative for dizziness, weakness, numbness and headaches.   Hematological:  Negative for adenopathy.   Psychiatric/Behavioral:  Negative for confusion.        Objective:      Physical Exam  Vitals reviewed.   Constitutional:       General: He is awake.      Appearance: Normal appearance.   HENT:      Head: Normocephalic and atraumatic.      Right Ear: Hearing normal.      Left Ear: Hearing normal.      Nose: Nose normal.   Eyes:      General: Lids are normal. Vision grossly intact.      Extraocular Movements: Extraocular movements intact.      Conjunctiva/sclera: Conjunctivae normal.   Cardiovascular:      Rate and Rhythm: Normal rate and regular rhythm.      Pulses: Normal pulses.      Heart sounds: Normal heart sounds.   Pulmonary:      Effort: Pulmonary effort is normal.      Breath sounds: Normal breath sounds. No wheezing, rhonchi or rales.   Abdominal:      General: Bowel sounds are normal.      Palpations: Abdomen is soft.      Tenderness: There is no  abdominal tenderness.   Musculoskeletal:      Cervical back: Full passive range of motion without pain.      Right lower leg: No edema.      Left lower leg: No edema.   Lymphadenopathy:      Cervical: No cervical adenopathy.      Upper Body:      Right upper body: Axillary adenopathy present. No supraclavicular adenopathy.      Left upper body: Supraclavicular adenopathy present. No axillary adenopathy.   Skin:     General: Skin is warm.   Neurological:      General: No focal deficit present.      Mental Status: He is alert and oriented to person, place, and time.   Psychiatric:         Attention and Perception: Attention normal.         Mood and Affect: Mood and affect normal.         Behavior: Behavior is cooperative.       LABS AND IMAGING REVIEWED IN EPIC          Assessment:     1.  Small lymphocytic lymphoma, Lugano stage III with a p53, 11q., and 13q. deletion.  2.  [place] 9/10 prostate cancer in 12/12 core biopsies  3.  Symptomatic anemia        Plan:       Patient currently getting treated for his prostate cancer by Urology and Radiation Oncology.  He now has progressing disease with multiple bone lesions.  Discussed recommendation for treatment with docetaxel every 3 weeks, including schedule and potential side effects. He is agreeable.     Radiation started on 9/12/2022    As for his Hodgkin lymphoma, most recent PET/CT essentially stable.  PET/CT scan on 12/06/2022 shows bone involvement, this is likely his prostate cancer.    Return to clinic in 3 weeks for TD visit/labs      IZABEL Balderas

## 2023-01-31 PROBLEM — C83.00: Status: RESOLVED | Noted: 2022-05-24 | Resolved: 2023-01-01

## 2023-02-01 NOTE — PROGRESS NOTES
Subjective:       Patient ID: Konstantin Arroyo is a 67 y.o. male.    Chief Complaint: Follow-up (Patient stated that he has been having trouble sleeping for about a week now, which is unusual for him. 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    Current Treatment:   1.  Docetaxel started on 11/10/2022 for prostate cancer.  Patient also getting androgen deprivation therapy with Urology.    Treatment History:  SLL -- progressive lymphadenopathy on repeat PET 3/4/2021-- Started treatment with Imbruvica 420mg PO daily shortly after 5/25/2021.  Due to transformation of SLL to Hodgkin lymphoma, we started ABVD--- Cycle #1 on 9/8/2021.  Bleomycin dropped with cycle 4, cycle 6-day 15 given on 2/10/2022.  Status post radiation therapy, started 09/2022.    HPI:  Patient who noticed a lump in his neck in April 2020.  He presented to his PCP who ordered an ultrasound of the neck.  Ultrasound on 4/22/2020 showed prominent lymphadenopathy involving the supraclavicular regions bilaterally and bilateral cervical chains.  This is concerning for lymphoma or metastatic disease.    He was seen by Dr. Hugh Neri on 4/29/2020 and plan was made for excisional biopsy of the lymph node.  Excisional biopsy was done on 4/30/2020 and pathology revealed small lymphocytic lymphoma, CD5 positive, CD20 positive, and CD23 positive.  Cyclin D1 was negative.  FISH prognostic panel on the lymph node biopsy revealed a p53 deletion (17p13), a mono allelic deletion of [id number] (13q14), and a deletion of SAMMI (11q22.3).  He was referred to medical oncology for further recommendations.    He presented initially to medical oncology on  5/18/2020.  CT scans of the neck, chest, abdomen, and pelvis were ordered at that time showed lymphadenopathy in the neck, chest, abdomen, and pelvis with mild hepatosplenomegaly.    Of note, due to an elevated PSA and total protein on 6/24/2020, the patient had protein electrophoresis ordered on his serum which was done on 6/25/2020 and revealed an M spike of 0.7.  Corresponding immunofixation showed an unremarkable pattern with no evidence of monoclonal protein apparent.  Patient was seen by Dr. Pee Knox with urology and underwent a prostate biopsy on 9/14/2020.  Pathology revealed adenocarcinoma, [place] score 9/10 in 12/12 cores.  Per patient report, he had MRI and bone scan at [facility], bone scan was negative and MRI showed pelvic lymphadenopathy.    Repeat scans on 10/06/2020 showed increase in size of some of the lymph nodes.    Patient started on radiation for prostate cancer on 12/14/2020.      PET/CT scan on 3/4/2021 showed hypermetabolic lymphadenopathy in the neck, chest, and abdomen, some of which have increased in size most significantly in the chest.  There was interval decrease in size of pelvic lymphadenopathy.  He started on abiraterone and prednisone through Dr. Papito Callaway with radiation oncology on 5/7/2021.  Ibrutinib ordered on 5/25/2021 and started shortly after.    PET/CT scan on 8/4/2021 showed disease progression most notable in the chest and abdomen with Deauville score of 5.  Left axillary lymph node biopsy on 8/18/2021 showed transformation SLL to Hodgkin lymphoma    Baseline ECHO on 9/7/2021 showed EF of 60-65%.  Baseline PFT on 9/7/2021 normal.  We started ABVD on 9/8/2021.  She received ABVD for 3 cycles, dropped the bleomycin with cycle 4. Patient completed chemotherapy on 02/10/2022.    End of treatment PET/CT scan on 02/28/2022 revealed complete response, Deauville score 2-3.  Recent PET/[facility] scan done at [facility] on 06/08/2022 revealed stable to interval increased size of multiple  lymph nodes above and below the diaphragm that remain mildly avid for FDG with resolution of mild diffuse bone marrow uptake.  There was a new focal uptake associated with sclerotic changes near the superior endplate of L3.  PSMA PET/CT on 08/04/2022 showed retrocaval and right common iliac lymph nodes with moderate to intense uptake consistent with metastatic prostate cancer.  There are multiple additional lymph nodes above and below the diaphragm that have not significantly changed.  There was a sclerotic lesion at L3 consistent with metastatic disease and no other suspicious uptake.     PET/CT scan done on 12/2/2022 (compared to PET/CT from 08/04/2021) showed improved size and hypermetabolism of reference lymph nodes with skeletal hypermetabolism.    Interval History:  Patient presents to clinic for scheduled follow up appointment.  He has been treating with docetaxel and overall tolerated it well, however his PSA is now increasing.  He is no other major issues.        Past Medical History:   Diagnosis Date    Anemia     Colon polyp 12/05/2014    colonoscopy    Diffuse well-differentiated lymphocytic lymphoma     Dyslipidemia     GERD (gastroesophageal reflux disease)     Hodgkin lymphoma     Hypercalcemia     Hypertension     Neuropathy       Past Surgical History:   Procedure Laterality Date    BONE MARROW ASPIRATION  12/2022    COLONOSCOPY W/ POLYPECTOMY  12/05/2014    ENDOSCOPIC CARPAL TUNNEL RELEASE Left 05/05/2021    ENDOSCOPIC CARPAL TUNNEL RELEASE Right 06/27/2019    INGUINAL HERNIA REPAIR Left     KNEE SURGERY Left     MEDIPORT INSERTION, SINGLE  09/08/2021    skin cancer removal on head  02/09/2022    skin cancer removal on right leg  01/26/2022     Social History     Socioeconomic History    Marital status: Single   Tobacco Use    Smoking status: Former     Types: Cigarettes    Smokeless tobacco: Never    Tobacco comments:     stopped at age 16   Substance and Sexual Activity    Alcohol use: Yes      Comment: 1-2 times per month    Drug use: Never      Family History   Problem Relation Age of Onset    Alzheimer's disease Mother     Arthritis Mother     Hypertension Mother     Cataracts Mother     Heart attack Father     Atrial fibrillation Brother       Review of patient's allergies indicates:  No Known Allergies   Review of Systems   Constitutional:  Negative for chills, diaphoresis, fatigue, fever and unexpected weight change.   HENT:  Negative for nasal congestion, mouth sores, sinus pressure/congestion and sore throat.    Eyes:  Negative for pain and visual disturbance.   Respiratory:  Negative for cough, chest tightness and shortness of breath.    Cardiovascular:  Negative for chest pain, palpitations and leg swelling.   Gastrointestinal:  Negative for abdominal distention, abdominal pain, blood in stool, constipation and diarrhea.   Genitourinary:  Negative for dysuria, frequency and hematuria.   Musculoskeletal:  Negative for arthralgias and back pain.   Integumentary:  Negative for rash.   Neurological:  Negative for dizziness, weakness, numbness and headaches.   Hematological:  Negative for adenopathy.   Psychiatric/Behavioral:  Negative for confusion.        Objective:      Physical Exam  Vitals reviewed.   Constitutional:       General: He is awake.      Appearance: Normal appearance.   HENT:      Head: Normocephalic and atraumatic.      Right Ear: Hearing normal.      Left Ear: Hearing normal.      Nose: Nose normal.   Eyes:      General: Lids are normal. Vision grossly intact.      Extraocular Movements: Extraocular movements intact.      Conjunctiva/sclera: Conjunctivae normal.   Cardiovascular:      Rate and Rhythm: Normal rate and regular rhythm.      Pulses: Normal pulses.      Heart sounds: Normal heart sounds.   Pulmonary:      Effort: Pulmonary effort is normal.      Breath sounds: Normal breath sounds. No wheezing, rhonchi or rales.   Abdominal:      General: Bowel sounds are normal.       Palpations: Abdomen is soft.      Tenderness: There is no abdominal tenderness.   Musculoskeletal:      Cervical back: Full passive range of motion without pain.      Right lower leg: No edema.      Left lower leg: No edema.   Lymphadenopathy:      Cervical: No cervical adenopathy.      Upper Body:      Right upper body: Axillary adenopathy present. No supraclavicular adenopathy.      Left upper body: Supraclavicular adenopathy present. No axillary adenopathy.   Skin:     General: Skin is warm.   Neurological:      General: No focal deficit present.      Mental Status: He is alert and oriented to person, place, and time.   Psychiatric:         Attention and Perception: Attention normal.         Mood and Affect: Mood and affect normal.         Behavior: Behavior is cooperative.       LABS AND IMAGING REVIEWED IN EPIC          Assessment:     1.  Small lymphocytic lymphoma, Lugano stage III with a p53, 11q., and 13q. deletion.  2.  [place] 9/10 prostate cancer in 12/12 core biopsies  3.  Symptomatic anemia        Plan:       Patient currently getting treated for his prostate cancer by Urology and Radiation Oncology.  He now has progressing disease with multiple bone lesions.  Discussed recommendation for treatment with docetaxel every 3 weeks, including schedule and potential side effects. He is agreeable.     As for his Hodgkin lymphoma, most recent PET/CT essentially stable.  PET/CT scan on 12/06/2022 shows bone involvement, this is likely his prostate cancer.    Repeat PSMA PET scan ordered (will request images from [facility]) due to increasing PSA.  Will hold docetaxel tomorrow.     Return to clinic in 3 weeks to review scan results      Pedro Sol II, MD I, Kathleen Green LPN, acted solely as a scribe for and in the presence of, Dr. Pedro Sol who performed the service.

## 2023-02-20 NOTE — PROGRESS NOTES
Subjective:       Patient ID: Karl Duane Jeter is a 67 y.o. male.    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    Current Treatment:   1.  Docetaxel started on 11/10/2022 for prostate cancer.  Patient also getting androgen deprivation therapy with Urology.    Treatment History:  SLL -- progressive lymphadenopathy on repeat PET 3/4/2021-- Started treatment with Imbruvica 420mg PO daily shortly after 5/25/2021.  Due to transformation of SLL to Hodgkin lymphoma, we started ABVD--- Cycle #1 on 9/8/2021.  Bleomycin dropped with cycle 4, cycle 6-day 15 given on 2/10/2022.  Status post radiation therapy, started 09/2022.    HPI:  Patient who noticed a lump in his neck in April 2020.  He presented to his PCP who ordered an ultrasound of the neck.  Ultrasound on 4/22/2020 showed prominent lymphadenopathy involving the supraclavicular regions bilaterally and bilateral cervical chains.  This is concerning for lymphoma or metastatic disease.    He was seen by Dr. Hugh Neri on 4/29/2020 and plan was made for excisional biopsy of the lymph node.  Excisional biopsy was done on 4/30/2020 and pathology revealed small lymphocytic lymphoma, CD5 positive, CD20 positive, and CD23 positive.  Cyclin D1 was negative.  FISH prognostic panel on the lymph node biopsy revealed a p53 deletion (17p13), a mono allelic deletion of [id number] (13q14), and a deletion of SAMMI (11q22.3).  He was referred to medical oncology for further recommendations.    He presented initially to medical oncology on 5/18/2020.  CT scans of the neck, chest, abdomen, and pelvis were  ordered at that time showed lymphadenopathy in the neck, chest, abdomen, and pelvis with mild hepatosplenomegaly.    Of note, due to an elevated PSA and total protein on 6/24/2020, the patient had protein electrophoresis ordered on his serum which was done on 6/25/2020 and revealed an M spike of 0.7.  Corresponding immunofixation showed an unremarkable pattern with no evidence of monoclonal protein apparent.  Patient was seen by Dr. Pee Knox with urology and underwent a prostate biopsy on 9/14/2020.  Pathology revealed adenocarcinoma, [place] score 9/10 in 12/12 cores.  Per patient report, he had MRI and bone scan at [facility], bone scan was negative and MRI showed pelvic lymphadenopathy.    Repeat scans on 10/06/2020 showed increase in size of some of the lymph nodes.    Patient started on radiation for prostate cancer on 12/14/2020.      PET/CT scan on 3/4/2021 showed hypermetabolic lymphadenopathy in the neck, chest, and abdomen, some of which have increased in size most significantly in the chest.  There was interval decrease in size of pelvic lymphadenopathy.  He started on abiraterone and prednisone through Dr. Papito Callaway with radiation oncology on 5/7/2021.  Ibrutinib ordered on 5/25/2021 and started shortly after.    PET/CT scan on 8/4/2021 showed disease progression most notable in the chest and abdomen with Deauville score of 5.  Left axillary lymph node biopsy on 8/18/2021 showed transformation SLL to Hodgkin lymphoma    Baseline ECHO on 9/7/2021 showed EF of 60-65%.  Baseline PFT on 9/7/2021 normal.  We started ABVD on 9/8/2021.  She received ABVD for 3 cycles, dropped the bleomycin with cycle 4. Patient completed chemotherapy on 02/10/2022.    End of treatment PET/CT scan on 02/28/2022 revealed complete response, Deauville score 2-3.  Recent PET/CT scan done at [facility] on 06/08/2022 revealed stable to interval increased size of multiple lymph nodes above and below the diaphragm that remain mildly avid  for FDG with resolution of mild diffuse bone marrow uptake.  There was a new focal uptake associated with sclerotic changes near the superior endplate of L3.  PSMA PET/CT on 08/04/2022 showed retrocaval and right common iliac lymph nodes with moderate to intense uptake consistent with metastatic prostate cancer.  There are multiple additional lymph nodes above and below the diaphragm that have not significantly changed.  There was a sclerotic lesion at L3 consistent with metastatic disease and no other suspicious uptake.     PET/CT scan done on 12/2/2022 (compared to PET/CT from 08/04/2021) showed improved size and hypermetabolism of reference lymph nodes with skeletal hypermetabolism.    Interval History:  Patient presents to clinic for scheduled follow up appointment.  He had his PSMA PET/CT scan, however this has not been read.  He states that he has rib pain and back pain.  He does have a consultation in [place] this Thursday.        Past Medical History:   Diagnosis Date    Anemia     Colon polyp 12/05/2014    colonoscopy    Diffuse well-differentiated lymphocytic lymphoma     Dyslipidemia     GERD (gastroesophageal reflux disease)     Hodgkin lymphoma     Hypercalcemia     Hypertension     Neuropathy       Past Surgical History:   Procedure Laterality Date    BONE MARROW ASPIRATION  12/2022    COLONOSCOPY W/ POLYPECTOMY  12/05/2014    ENDOSCOPIC CARPAL TUNNEL RELEASE Left 05/05/2021    ENDOSCOPIC CARPAL TUNNEL RELEASE Right 06/27/2019    INGUINAL HERNIA REPAIR Left     KNEE SURGERY Left     MEDIPORT INSERTION, SINGLE  09/08/2021    skin cancer removal on head  02/09/2022    skin cancer removal on right leg  01/26/2022     Social History     Socioeconomic History    Marital status: Single   Tobacco Use    Smoking status: Former     Types: Cigarettes    Smokeless tobacco: Never    Tobacco comments:     stopped at age 16   Substance and Sexual Activity    Alcohol use: Yes     Comment: 1-2 times per month     Drug use: Never      Family History   Problem Relation Age of Onset    Alzheimer's disease Mother     Arthritis Mother     Hypertension Mother     Cataracts Mother     Heart attack Father     Atrial fibrillation Brother       Review of patient's allergies indicates:  No Known Allergies   Review of Systems   Constitutional:  Negative for chills, diaphoresis, fatigue, fever and unexpected weight change.   HENT:  Negative for nasal congestion, mouth sores, sinus pressure/congestion and sore throat.    Eyes:  Negative for pain and visual disturbance.   Respiratory:  Negative for cough, chest tightness and shortness of breath.    Cardiovascular:  Negative for chest pain, palpitations and leg swelling.   Gastrointestinal:  Negative for abdominal distention, abdominal pain, blood in stool, constipation and diarrhea.   Genitourinary:  Negative for dysuria, frequency and hematuria.   Musculoskeletal:  Negative for arthralgias and back pain.   Integumentary:  Negative for rash.   Neurological:  Negative for dizziness, weakness, numbness and headaches.   Hematological:  Negative for adenopathy.   Psychiatric/Behavioral:  Negative for confusion.        Objective:      Physical Exam  Vitals reviewed.   Constitutional:       General: He is awake.      Appearance: Normal appearance.   HENT:      Head: Normocephalic and atraumatic.      Right Ear: Hearing normal.      Left Ear: Hearing normal.      Nose: Nose normal.   Eyes:      General: Lids are normal. Vision grossly intact.      Extraocular Movements: Extraocular movements intact.      Conjunctiva/sclera: Conjunctivae normal.   Cardiovascular:      Rate and Rhythm: Normal rate and regular rhythm.      Pulses: Normal pulses.      Heart sounds: Normal heart sounds.   Pulmonary:      Effort: Pulmonary effort is normal.      Breath sounds: Normal breath sounds. No wheezing, rhonchi or rales.   Abdominal:      General: Bowel sounds are normal.      Palpations: Abdomen is soft.       Tenderness: There is no abdominal tenderness.   Musculoskeletal:      Cervical back: Full passive range of motion without pain.      Right lower leg: No edema.      Left lower leg: No edema.   Lymphadenopathy:      Cervical: No cervical adenopathy.      Upper Body:      Right upper body: Axillary adenopathy present. No supraclavicular adenopathy.      Left upper body: Supraclavicular adenopathy present. No axillary adenopathy.   Skin:     General: Skin is warm.   Neurological:      General: No focal deficit present.      Mental Status: He is alert and oriented to person, place, and time.   Psychiatric:         Attention and Perception: Attention normal.         Mood and Affect: Mood and affect normal.         Behavior: Behavior is cooperative.       LABS AND IMAGING REVIEWED IN EPIC          Assessment:     1.  Small lymphocytic lymphoma, Lugano stage III with a p53, 11q., and 13q. deletion.  2.  Bertha 9/10 prostate cancer in 12/12 core biopsies  3.  Symptomatic anemia        Plan:       Patient currently getting treated for his prostate cancer by Urology and Radiation Oncology.  He now has progressing disease with multiple bone lesions.  Discussed recommendation for treatment with docetaxel every 3 weeks, including schedule and potential side effects.     We will call him with the results of his PSMA PET scan.    As for his Hodgkin lymphoma, most recent PET/CT essentially stable.  PET/CT scan on 12/06/2022 shows bone involvement, this is likely his prostate cancer.    Return to clinic in 3 weeks to review plans for his consultation in [place].      Pedro Sol II, MD I, Kathleen Green LPN, acted solely as a scribe for and in the presence of, Dr. Pedro Sol who performed the service.

## 2023-03-02 NOTE — PROGRESS NOTES
"  Subjective:       Patient ID: Karl Duane Jeter is a 67 y.o. male.    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    Current Treatment:   Cabazitaxel 20 mg per m2 and carboplatin AUC of 4 every 3 weeks with growth factor support to start on 03/13/2023.    Treatment History:  SLL -- progressive lymphadenopathy on repeat PET 3/4/2021-- Started treatment with Imbruvica 420mg PO daily shortly after 5/25/2021.  Due to transformation of SLL to Hodgkin lymphoma, we started ABVD--- Cycle #1 on 9/8/2021.  Bleomycin dropped with cycle 4, cycle 6-day 15 given on 2/10/2022.  Status post radiation therapy, started 09/2022.  Docetaxel started on 11/10/2022 for prostate cancer.  Received cycle 4 day 1 on 01/12/2023.  Stopped due to progression  Patient also getting androgen deprivation therapy with Urology.    HPI:  Patient who noticed a lump in his neck in April 2020.  He presented to his PCP who ordered an ultrasound of the neck.  Ultrasound on 4/22/2020 showed prominent lymphadenopathy involving the supraclavicular regions bilaterally and bilateral cervical chains.  This is concerning for lymphoma or metastatic disease.    He was seen by Dr. Hugh Neri on 4/29/2020 and plan was made for excisional biopsy of the lymph node.  Excisional biopsy was done on 4/30/2020 and pathology revealed small lymphocytic lymphoma, CD5 positive, CD20 positive, and CD23 positive.  Cyclin D1 was negative.  FISH prognostic panel on the lymph node biopsy revealed a p53 deletion (17p13), a mono allelic deletion of [id number] (13q14), and a deletion of SAMMI (11q22.3).  He was referred to medical " oncology for further recommendations.    He presented initially to medical oncology on 5/18/2020.  CT scans of the neck, chest, abdomen, and pelvis were ordered at that time showed lymphadenopathy in the neck, chest, abdomen, and pelvis with mild hepatosplenomegaly.    Of note, due to an elevated PSA and total protein on 6/24/2020, the patient had protein electrophoresis ordered on his serum which was done on 6/25/2020 and revealed an M spike of 0.7.  Corresponding immunofixation showed an unremarkable pattern with no evidence of monoclonal protein apparent.  Patient was seen by Dr. Pee Knox with urology and underwent a prostate biopsy on 9/14/2020.  Pathology revealed adenocarcinoma, [place] score 9/10 in 12/12 cores.  Per patient report, he had MRI and bone scan at [facility], bone scan was negative and MRI showed pelvic lymphadenopathy.    Repeat scans on 10/06/2020 showed increase in size of some of the lymph nodes.    Patient started on radiation for prostate cancer on 12/14/2020.      PET/CT scan on 3/4/2021 showed hypermetabolic lymphadenopathy in the neck, chest, and abdomen, some of which have increased in size most significantly in the chest.  There was interval decrease in size of pelvic lymphadenopathy.  He started on abiraterone and prednisone through Dr. Papito Callaway with radiation oncology on 5/7/2021.  Ibrutinib ordered on 5/25/2021 and started shortly after.    PET/CT scan on 8/4/2021 showed disease progression most notable in the chest and abdomen with Deauville score of 5.  Left axillary lymph node biopsy on 8/18/2021 showed transformation SLL to Hodgkin lymphoma    Baseline ECHO on 9/7/2021 showed EF of 60-65%.  Baseline PFT on 9/7/2021 normal.  We started ABVD on 9/8/2021.  She received ABVD for 3 cycles, dropped the bleomycin with cycle 4. Patient completed chemotherapy on 02/10/2022.    End of treatment PET/CT scan on 02/28/2022 revealed complete response, Deauville score 2-3.  Recent PET/CT scan  done [facility] on 06/08/2022 revealed stable to interval increased size of multiple lymph nodes above and below the diaphragm that remain mildly avid for FDG with resolution of mild diffuse bone marrow uptake.  There was a new focal uptake associated with sclerotic changes near the superior endplate of L3.  PSMA PET/CT on 08/04/2022 showed retrocaval and right common iliac lymph nodes with moderate to intense uptake consistent with metastatic prostate cancer.  There are multiple additional lymph nodes above and below the diaphragm that have not significantly changed.  There was a sclerotic lesion at L3 consistent with metastatic disease and no other suspicious uptake.     PET/CT scan done on 12/2/2022 (compared to PET/CT from 08/04/2021) showed improved size and hypermetabolism of reference lymph nodes with skeletal hypermetabolism.  Then had a repeat PSMA PET/CT scan on 02/17/2023 that showed multiple lymph nodes involved and diffuse osseous involvement of prostate cancer.    Interval History:  Patient presents to clinic for scheduled follow up appointment with brother. He understandably upset regarding his scan results, and is concerned about not being able to continue working.  He does state that he has had some pain, but this has resolved.          Past Medical History:   Diagnosis Date    Anemia     Colon polyp 12/05/2014    colonoscopy    Diffuse well-differentiated lymphocytic lymphoma     Dyslipidemia     GERD (gastroesophageal reflux disease)     Hodgkin lymphoma     Hypercalcemia     Hypertension     Neuropathy       Past Surgical History:   Procedure Laterality Date    BONE MARROW ASPIRATION  12/2022    COLONOSCOPY W/ POLYPECTOMY  12/05/2014    ENDOSCOPIC CARPAL TUNNEL RELEASE Left 05/05/2021    ENDOSCOPIC CARPAL TUNNEL RELEASE Right 06/27/2019    INGUINAL HERNIA REPAIR Left     KNEE SURGERY Left     MEDIPORT INSERTION, SINGLE  09/08/2021    skin cancer removal on head  02/09/2022    skin cancer  removal on right leg  01/26/2022     Social History     Socioeconomic History    Marital status: Single   Tobacco Use    Smoking status: Former     Types: Cigarettes    Smokeless tobacco: Never    Tobacco comments:     stopped at age 16   Substance and Sexual Activity    Alcohol use: Yes     Comment: 1-2 times per month    Drug use: Never      Family History   Problem Relation Age of Onset    Alzheimer's disease Mother     Arthritis Mother     Hypertension Mother     Cataracts Mother     Heart attack Father     Atrial fibrillation Brother       Review of patient's allergies indicates:  No Known Allergies   Review of Systems   Constitutional:  Negative for chills, diaphoresis, fatigue, fever and unexpected weight change.   HENT:  Negative for nasal congestion, mouth sores, sinus pressure/congestion and sore throat.    Eyes:  Negative for pain and visual disturbance.   Respiratory:  Negative for cough, chest tightness and shortness of breath.    Cardiovascular:  Negative for chest pain, palpitations and leg swelling.   Gastrointestinal:  Negative for abdominal distention, abdominal pain, blood in stool, constipation and diarrhea.   Genitourinary:  Negative for dysuria, frequency and hematuria.   Musculoskeletal:  Negative for arthralgias and back pain.   Integumentary:  Negative for rash.   Neurological:  Negative for dizziness, weakness, numbness and headaches.   Hematological:  Negative for adenopathy.   Psychiatric/Behavioral:  Negative for confusion.        Objective:      Physical Exam  Vitals reviewed.   Constitutional:       General: He is awake.      Appearance: Normal appearance.   HENT:      Head: Normocephalic and atraumatic.      Right Ear: Hearing normal.      Left Ear: Hearing normal.      Nose: Nose normal.   Eyes:      General: Lids are normal. Vision grossly intact.      Extraocular Movements: Extraocular movements intact.      Conjunctiva/sclera: Conjunctivae normal.   Cardiovascular:      Rate and  Rhythm: Normal rate and regular rhythm.      Pulses: Normal pulses.      Heart sounds: Normal heart sounds.   Pulmonary:      Effort: Pulmonary effort is normal.      Breath sounds: Normal breath sounds. No wheezing, rhonchi or rales.   Abdominal:      General: Bowel sounds are normal.      Palpations: Abdomen is soft.      Tenderness: There is no abdominal tenderness.   Musculoskeletal:      Cervical back: Full passive range of motion without pain.      Right lower leg: No edema.      Left lower leg: No edema.   Lymphadenopathy:      Cervical: No cervical adenopathy.      Upper Body:      Right upper body: Axillary adenopathy present. No supraclavicular adenopathy.      Left upper body: Supraclavicular adenopathy present. No axillary adenopathy.   Skin:     General: Skin is warm.   Neurological:      General: No focal deficit present.      Mental Status: He is alert and oriented to person, place, and time.   Psychiatric:         Attention and Perception: Attention normal.         Mood and Affect: Mood and affect normal.         Behavior: Behavior is cooperative.       LABS AND IMAGING REVIEWED IN EPIC          Assessment:     1.  Small lymphocytic lymphoma, Lugano stage III with a p53, 11q., and 13q. deletion.  2.  Bertha 9/10 prostate cancer in 12/12 core biopsies  3.  Symptomatic anemia        Plan:       Patient currently getting treated for his prostate cancer by Urology and Radiation Oncology.  He received 4 cycles of docetaxel, but continued to have increase in his PSA level and a PSMA PET/CT scan showed progression of disease on 02/17/2023.  Switched from docetaxel to cabazitaxel and carboplatin.    As for his Hodgkin lymphoma, most recent PET/CT essentially stable.  PET/CT scan on 12/06/2022 shows bone involvement, this is likely his prostate cancer.    Due to multiple lymph nodes being involved, I will go ahead and get a biopsy of 1 of these to make sure this is not his lymphoma.    Discuss with Dr. Reese  Halle whether or not he can biopsy 1 of the cervical lymph nodes.  If not, we will get IR to biopsy.    Patient will need a CBC, CMP, and PSA next week.    Return to clinic on 03/30/2023 for TD visit and labs prior to Cycle 2    Consented the patient to the treatment plan and the patient was educated on the planned duration of the treatment and schedule of the treatment administration.        Pedro Sol II, MD

## 2023-03-06 NOTE — TELEPHONE ENCOUNTER
Patient states he has not heard from surgeon regarding bx. He mentioned possibly taking alternate route if surgeon could not perform. Please advise.

## 2023-03-07 NOTE — TELEPHONE ENCOUNTER
Per Luis E Neri's office - referral has been received. They will contact patient once reviewed by Dr. Neri. Patient notified and voiced understanding.

## 2023-03-27 NOTE — H&P (VIEW-ONLY)
History & Physical    CHIEF COMPLAINT:  LYMPHOMA     History of Present Illness:  67 year-old-male previously for excisional biopsy of left supraclavicular lymph node in 2020.  The biopsy revealed small lymphocytic lymphoma.  Recent PET scan done in February showed numerous new hypermetabolic osseous lesions with multiple new tracer avid cervical, thoracic and abdominal lymph nodes.  Dr. Sol has referred him back for biopsy.      Review of patient's allergies indicates:  No Known Allergies    Current Outpatient Medications   Medication Sig Dispense Refill    amLODIPine (NORVASC) 10 MG tablet Take 1 tablet (10 mg total) by mouth once daily. 90 tablet 1    ergocalciferol, vitamin D2, (VITAMIN D ORAL) Take by mouth once daily.      naproxen sodium 220 mg Cap Take 2 capsules by mouth as needed.      NIACIN ORAL Take by mouth once daily.      polyethylene glycol 3350 (MIRALAX ORAL) Take 17 g by mouth once daily.      pyridoxine HCl, vitamin B6, (VITAMIN B-6 ORAL) Take by mouth once daily.      tamsulosin (FLOMAX) 0.4 mg Cap Take 0.4 mg by mouth once daily.      vitamin B complex (B COMPLEX VITAMINS ORAL) Take by mouth once daily.       No current facility-administered medications for this visit.       Past Medical History:   Diagnosis Date    Anemia     Colon polyp 12/05/2014    colonoscopy    Diffuse well-differentiated lymphocytic lymphoma     Dyslipidemia     GERD (gastroesophageal reflux disease)     Hodgkin lymphoma     Hypercalcemia     Hypertension     Neuropathy      Past Surgical History:   Procedure Laterality Date    BONE MARROW ASPIRATION  12/2022    COLONOSCOPY W/ POLYPECTOMY  12/05/2014    ENDOSCOPIC CARPAL TUNNEL RELEASE Left 05/05/2021    ENDOSCOPIC CARPAL TUNNEL RELEASE Right 06/27/2019    INGUINAL HERNIA REPAIR Left     KNEE SURGERY Left     MEDIPORT INSERTION, SINGLE  09/08/2021    skin cancer removal on head  02/09/2022    skin cancer removal on right leg  01/26/2022     Family History   Problem  Relation Age of Onset    Alzheimer's disease Mother     Arthritis Mother     Hypertension Mother     Cataracts Mother     Heart attack Father     Atrial fibrillation Brother      Social History     Tobacco Use    Smoking status: Former     Types: Cigarettes    Smokeless tobacco: Never    Tobacco comments:     stopped at age 16   Substance Use Topics    Alcohol use: Yes     Comment: 1-2 times per month    Drug use: Never        Review of Systems:  Review of Systems   Constitutional:  Negative for activity change, appetite change, chills, diaphoresis, fatigue and fever.   HENT:  Negative for congestion, ear pain, tinnitus and trouble swallowing.    Eyes:  Negative for photophobia and pain.   Respiratory:  Negative for apnea, cough, choking, chest tightness, shortness of breath and stridor.    Cardiovascular:  Negative for chest pain, palpitations and leg swelling.   Endocrine: Negative for cold intolerance and heat intolerance.   Genitourinary:  Negative for difficulty urinating, dysuria, enuresis, flank pain, frequency and hematuria.   Musculoskeletal:  Negative for arthralgias, back pain and gait problem.   Neurological:  Negative for dizziness, seizures, syncope, facial asymmetry, speech difficulty, weakness, light-headedness, numbness and headaches.   Psychiatric/Behavioral:  Negative for agitation, behavioral problems, confusion and decreased concentration.    All other systems reviewed and are negative.    OBJECTIVE:     Vital Signs (Most Recent)              Physical Exam:  Physical Exam  Constitutional:       General: He is not in acute distress.     Appearance: Normal appearance. He is well-developed and normal weight. He is not ill-appearing, toxic-appearing or diaphoretic.   HENT:      Head: Normocephalic and atraumatic.      Right Ear: Hearing and external ear normal.      Left Ear: Hearing and external ear normal.      Nose: No congestion or rhinorrhea.      Mouth/Throat:      Mouth: Mucous membranes are  moist.      Pharynx: Oropharynx is clear. No oropharyngeal exudate.   Eyes:      General: Lids are normal. Gaze aligned appropriately. No scleral icterus.     Extraocular Movements: Extraocular movements intact.      Right eye: Normal extraocular motion and no nystagmus.      Left eye: Normal extraocular motion and no nystagmus.      Conjunctiva/sclera: Conjunctivae normal.      Right eye: Right conjunctiva is not injected.      Left eye: Left conjunctiva is not injected.      Pupils: Pupils are equal, round, and reactive to light.   Neck:      Vascular: No carotid bruit or JVD.      Trachea: Trachea and phonation normal.   Cardiovascular:      Rate and Rhythm: Normal rate and regular rhythm.      Pulses: Normal pulses.      Heart sounds: Normal heart sounds. No murmur heard.    No friction rub. No gallop.   Pulmonary:      Effort: Pulmonary effort is normal. No tachypnea, accessory muscle usage, respiratory distress or retractions.      Breath sounds: Normal breath sounds and air entry. No stridor or decreased air movement. No wheezing or rhonchi.   Chest:      Chest wall: No mass, deformity, swelling, tenderness or crepitus.   Abdominal:      General: Abdomen is flat. Bowel sounds are normal. There is no distension. There are no signs of injury.      Palpations: Abdomen is soft. There is no shifting dullness, fluid wave, hepatomegaly, splenomegaly or mass.      Tenderness: There is no abdominal tenderness. There is no guarding or rebound.      Hernia: No hernia is present.   Musculoskeletal:         General: No swelling or tenderness.      Cervical back: Normal range of motion and neck supple. No rigidity, tenderness or crepitus.   Lymphadenopathy:      Head:      Right side of head: No submental, submandibular or occipital adenopathy.      Left side of head: No submental, submandibular or occipital adenopathy.      Cervical: Cervical adenopathy present.      Right cervical: No superficial or posterior cervical  adenopathy.     Left cervical: Superficial cervical adenopathy and deep cervical adenopathy present. No posterior cervical adenopathy.      Upper Body:      Right upper body: No supraclavicular or axillary adenopathy.      Left upper body: Supraclavicular adenopathy and axillary adenopathy present.      Lower Body: No right inguinal adenopathy. No left inguinal adenopathy.   Skin:     General: Skin is warm.      Capillary Refill: Capillary refill takes less than 2 seconds.      Coloration: Skin is not cyanotic, jaundiced, mottled or pale.      Findings: No bruising, ecchymosis, erythema, lesion, petechiae or rash.      Nails: There is no clubbing.   Neurological:      General: No focal deficit present.      Mental Status: He is alert and oriented to person, place, and time.      Cranial Nerves: No cranial nerve deficit or facial asymmetry.      Sensory: No sensory deficit.      Motor: No weakness, tremor, atrophy or abnormal muscle tone.      Coordination: Coordination normal.      Gait: Gait normal.      Deep Tendon Reflexes: Reflexes normal.   Psychiatric:         Attention and Perception: Attention and perception normal.         Mood and Affect: Mood normal. Mood is not anxious or depressed. Affect is not blunt or flat.         Speech: Speech normal. Speech is not slurred.         Behavior: Behavior normal. Behavior is cooperative.         ASSESSMENT/PLAN:     New and increasing lymphadenopathy in this patient with lymphoma, medical oncology recommended additional tissue sampling.    Schedule ultrasound-guided excisional biopsy left supraclavicular lymph node    The risks and benefits of the procedure were explained in detail using layman terms, including the pros and cons of any implant that may be used, all questions were addressed, the patient gives consent to proceed    Hugh Neri MD  Surgical Oncology  Complex General, Gastrointestinal and Hepatobiliary Surgery

## 2023-03-30 NOTE — ANESTHESIA PREPROCEDURE EVALUATION
03/30/2023  Karl Duane Jeter is a 67 y.o., male.    Pre-op Diagnosis: Lymphadenopathy [R59.1]    Procedure(s):  EXCISIONAL BIOPSY, LYMPH NODE/ LEFT SUPRACLAVICULAR NODE / WITH ULTRASOUND GUIDANCE     Review of patient's allergies indicates:  No Known Allergies    Current Outpatient Medications   Medication Instructions    amLODIPine (NORVASC) 10 mg, Oral, Daily    calcium carbonate (OS-ESTUARDO) 500 mg calcium (1,250 mg) tablet 1 tablet, Oral, Daily    ergocalciferol, vitamin D2, (VITAMIN D ORAL) Oral, Daily    naproxen sodium 220 mg Cap 2 capsules, Oral, As needed (PRN)    NIACIN ORAL Oral, Daily    polyethylene glycol 3350 (MIRALAX ORAL) 17 g, Oral, Daily    pyridoxine HCl, vitamin B6, (VITAMIN B-6 ORAL) Oral, Daily    tamsulosin (FLOMAX) 0.4 mg, Oral, Daily    vitamin B complex (B COMPLEX VITAMINS ORAL) Oral, Daily       [facility]  ULTRASONIC GUIDANCE, INTRAOPERATIVE [08984] (EXCISIONAL*    Past Medical History:   Diagnosis Date    Anemia     Colon polyp 12/05/2014    colonoscopy    Diffuse well-differentiated lymphocytic lymphoma     Dyslipidemia     GERD (gastroesophageal reflux disease)     Hodgkin lymphoma     Hypercalcemia     Hypertension     Neuropathy        Past Surgical History:   Procedure Laterality Date    BONE MARROW ASPIRATION  12/2022    COLONOSCOPY W/ POLYPECTOMY  12/05/2014    ENDOSCOPIC CARPAL TUNNEL RELEASE Left 05/05/2021    ENDOSCOPIC CARPAL TUNNEL RELEASE Right 06/27/2019    INGUINAL HERNIA REPAIR Left     KNEE SURGERY Left     MEDIPORT INSERTION, SINGLE  09/08/2021    skin cancer removal on head  02/09/2022    skin cancer removal on right leg  01/26/2022     Lab Results   Component Value Date    WBC 6.8 03/10/2023    HGB 9.5 (L) 03/10/2023    HCT 32.2 (L) 03/10/2023    MCV 90.2 03/10/2023     (H) 03/10/2023   BMP  Lab Results   Component Value Date      03/10/2023    K 3.9 03/10/2023    CO2 26 03/10/2023    BUN 11.3 03/10/2023    CREATININE 0.78 03/10/2023    CALCIUM 8.6 (L) 03/10/2023    EGFRNONAA >60 06/15/2022      TTE 9/7/2021          Pre-op Assessment    I have reviewed the Patient Summary Reports.    I have reviewed the NPO Status.   I have reviewed the Medications.     Review of Systems  Anesthesia Hx:  No problems with previous Anesthesia  Denies Family Hx of Anesthesia complications.   Denies Personal Hx of Anesthesia complications.   Social:  Non-Smoker    Hematology/Oncology:         -- Anemia: Current/Recent Cancer. Oncology Comments: Hodgkin's Lymphoma  Lymphocytic Lymphoma    Cardiovascular:   Exercise tolerance: good Hypertension  Denies Angina.  Denies Orthopnea.  Denies PND. hyperlipidemia  Denies BELTRE.  Functional Capacity good / => 4 METS    Hepatic/GI:   GERD    Musculoskeletal:  Musculoskeletal Normal    Neurological:   Denies TIA. Denies CVA.   Peripheral Neuropathy    Psych:  Psychiatric Normal           Physical Exam  General: Well nourished, Alert and Oriented    Airway:  Mallampati: III   Mouth Opening: Normal  TM Distance: Normal  Tongue: Normal  Neck ROM: Normal ROM    Dental:  Intact    Chest/Lungs:  Clear to auscultation    Heart:  Rate: Normal  Rhythm: Regular Rhythm  No pretibial edema  No carotid bruits      Anesthesia Plan  Type of Anesthesia, risks & benefits discussed:    Anesthesia Type: Gen Supraglottic Airway  Intra-op Monitoring Plan: Standard ASA Monitors  Post Op Pain Control Plan: multimodal analgesia  Induction:  IV  Airway Plan: Direct, Post-Induction  Informed Consent: Informed consent signed with the Patient and all parties understand the risks and agree with anesthesia plan.  All questions answered. Patient consented to blood products? No  ASA Score: 3  Day of Surgery Review of History & Physical: H&P Update referred to the surgeon/provider.    Ready For Surgery From Anesthesia Perspective.     .

## 2023-03-30 NOTE — DISCHARGE INSTRUCTIONS
Patient Education       Breast Biopsy Discharge Instructions   About this topic   A biopsy looks at cells that are not normal. The doctor may be looking for cancer or other tumors. The doctor may be looking for other health problems.  A needle biopsy of the breast is done to look at an area in the breast that is not normal. It may be done if you have:  A lump  Areas that are thicker than other places in the breast  Nipple area that has changed or looks different  Leaking or discharge from the nipple  Breast ultrasound or mammogram results that are not normal     What care is needed at home?   Place an ice pack wrapped in a towel over the painful part. Never put ice right on the skin. Do not leave the ice on more than 10 to 15 minutes at a time.   Wear a bra that gives you good support.  Wash your hands before touching your wound or dressing. Remove your dressing tomorrow. Wash your incsion, pat dry, then leave open to air.  You may take a shower tomorrow.  No heavy lifting over 10 pounds (4.5 kg)  What follow-up care is needed?   Be sure to keep these visits.  Will physical activity be limited?   You may have to limit your activity. Talk to your doctor about the right amount of activity for you.  What problems could happen?   Bleeding  Infection  Bruising  Swelling  When do I need to call the doctor?   Signs of infection like fever of 100.4°F (38°C) or higher, chills, or wound that will not heal.  Signs of wound infection. These include swelling, redness, warmth around the wound; too much pain when touched; yellowish, greenish, or bloody discharge; foul smell coming from the cut site; cut site opens up.  Pain is not controlled by your drugs  Health problem is not better or you are feeling worse

## 2023-03-31 NOTE — TRANSFER OF CARE
"Anesthesia Transfer of Care Note    Patient: Karl Duane Jeter    Procedure(s) Performed: Procedure(s) (LRB):  EXCISIONAL BIOPSY, LYMPH NODE/ LEFT SUPRACLAVICULAR NODE / WITH ULTRASOUND GUIDANCE (Left)    Patient location: PACU    Anesthesia Type: general    Transport from OR: Transported from OR on room air with adequate spontaneous ventilation    Post pain: adequate analgesia    Post assessment: no apparent anesthetic complications    Post vital signs: stable    Level of consciousness: sedated    Nausea/Vomiting: no nausea/vomiting    Complications: none    Transfer of care protocol was followed      Last vitals:   Visit Vitals  /68   Pulse (!) 42   Temp 36.6 °C (97.9 °F) (Oral)   Resp 18   Ht 5' 11" (1.803 m)   Wt 81.1 kg (178 lb 12.7 oz)   SpO2 99%   BMI 24.94 kg/m²     "

## 2023-03-31 NOTE — ANESTHESIA PROCEDURE NOTES
Intubation    Date/Time: 3/31/2023 12:03 PM  Performed by: Di Truong CRNA  Authorized by: Froy Loomis MD     Intubation:     Induction:  Intravenous    Intubated:  Postinduction    Mask Ventilation:  Easy mask    Attempts:  1    Attempted By:  CRNA    Difficult Airway Encountered?: No      Airway Device:  Supraglottic airway/LMA    Airway Device Size:  4.0    Style/Cuff Inflation:  Cuffed (inflated to minimal occlusive pressure)    Inflation Amount (mL):  18    Placement Verified By:  Capnometry    Complicating Factors:  None    Findings Post-Intubation:  BS equal bilateral

## 2023-03-31 NOTE — PROGRESS NOTES
Site noted with dressing intact, area soft and warm to the touch, color WNL.  Ice pack place on op site

## 2023-03-31 NOTE — OP NOTE
Date of Surgery:  March 31, 2023    Surgeon:  Hugh Neri MD    Assistant:  None                                        Pre-op Diagnosis:  Lymphoma, lymphadenopathy    Post-op Diagnosis:  Same    Procedure:  Ultrasound-guided excisional biopsy left supraclavicular lymph nodes    Anesthesia:  GETA    EBL:  10 cc    Specimens:  Supraclavicular lymph nodes were sent fresh for histopathology, flow cytometry    Findings:  Representative enlarged left supraclavicular lymph nodes concordant with preoperative PET-CT scan were removed and sent fresh for histopathology and flow cytometry    Procedure in detail:  After informed consent was obtained patient brought to operating placed supine position.  General endotracheal anesthesia administered without difficulty.  The patient's left supraclavicular fossa was prepped and draped in sterile fashion.  I performed a focused ultrasound and identified several enlarged abnormal appearing hypoechoic lymph nodes in the supraclavicular fossa.  Using ultrasound guidance I targeted these for excisional biopsy.  Incision was made over the area after infiltration of local anesthesia.  Dissection was carried out through the platysma using electrocautery and careful blunt dissection was used to remove several enlarged representative nodes intact with entering vessels divided using electrocautery.  These nodes were sent fresh for histopathology and flow cytometry, lymph node panel.  Meticulous hemostasis was achieved.  The wound was closed in multiple layers with absorbable suture and sterile dressings were applied.  The patient tolerated the procedure well.    Brief Discharge Note:    Outcome: Satisfactory  Disposition: Discharged home postoperatively in good condition  Followup:  2 weeks  Final Diagnosis same as postoperative diagnosis        Hugh Neri MD  Surgical Oncology  Complex General, Gastrointestinal and Hepatobiliary Surgery

## 2023-03-31 NOTE — ANESTHESIA POSTPROCEDURE EVALUATION
Anesthesia Post Evaluation    Patient: Karl Duane Jeter    Procedure(s) Performed: Procedure(s) (LRB):  EXCISIONAL BIOPSY, LYMPH NODE/ LEFT SUPRACLAVICULAR NODE / WITH ULTRASOUND GUIDANCE (Left)    Final Anesthesia Type: general      Patient location during evaluation: PACU  Patient participation: Yes- Able to Participate  Level of consciousness: awake and alert and oriented  Post-procedure vital signs: reviewed and stable  Pain management: adequate  Airway patency: patent    PONV status at discharge: No PONV  Anesthetic complications: no      Cardiovascular status: hemodynamically stable  Respiratory status: unassisted  Hydration status: euvolemic  Follow-up not needed.          Vitals Value Taken Time   /63 03/31/23 1318   Temp 36 °C (96.8 °F) 03/31/23 1245   Pulse 80 03/31/23 1318   Resp 21 03/31/23 1318   SpO2 98 % 03/31/23 1318   Vitals shown include unvalidated device data.      No case tracking events are documented in the log.      Pain/Tommy Score: Pain Rating Prior to Med Admin: 0 (3/31/2023 10:20 AM)  Tommy Score: 9 (3/31/2023  1:13 PM)

## 2023-04-03 PROBLEM — C81.98 HODGKIN LYMPHOMA OF LYMPH NODES OF MULTIPLE REGIONS: Status: ACTIVE | Noted: 2023-01-01

## 2023-04-03 NOTE — PROGRESS NOTES
"  Subjective:       Patient ID: Karl Duane Jeter is a 67 y.o. male.    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    Current Treatment:   Cabazitaxel 20 mg per m2 and carboplatin AUC of 4 every 3 weeks with growth factor support to started on 03/13/2023.    Treatment History:  SLL -- progressive lymphadenopathy on repeat PET 3/4/2021-- Started treatment with Imbruvica 420mg PO daily shortly after 5/25/2021.  Due to transformation of SLL to Hodgkin lymphoma, we started ABVD--- Cycle #1 on 9/8/2021.  Bleomycin dropped with cycle 4, cycle 6-day 15 given on 2/10/2022.  Status post radiation therapy, started 09/2022.  Docetaxel started on 11/10/2022 for prostate cancer.  Received cycle 4 day 1 on 01/12/2023.  Stopped due to progression  Patient also getting androgen deprivation therapy with Urology.    HPI:  Patient who noticed a lump in his neck in April 2020.  He presented to his PCP who ordered an ultrasound of the neck.  Ultrasound on 4/22/2020 showed prominent lymphadenopathy involving the supraclavicular regions bilaterally and bilateral cervical chains.  This is concerning for lymphoma or metastatic disease.    He was seen by Dr. Hugh Neri on 4/29/2020 and plan was made for excisional biopsy of the lymph node.  Excisional biopsy was done on 4/30/2020 and pathology revealed small lymphocytic lymphoma, CD5 positive, CD20 positive, and CD23 positive.  Cyclin D1 was negative.  FISH prognostic panel on the lymph node biopsy revealed a p53 deletion (17p13), a mono allelic deletion of [id number] (13q14), and a deletion of SAMMI (11q22.3).  He was referred to medical " oncology for further recommendations.    He presented initially to medical oncology on 5/18/2020.  CT scans of the neck, chest, abdomen, and pelvis were ordered at that time showed lymphadenopathy in the neck, chest, abdomen, and pelvis with mild hepatosplenomegaly.    Of note, due to an elevated PSA and total protein on 6/24/2020, the patient had protein electrophoresis ordered on his serum which was done on 6/25/2020 and revealed an M spike of 0.7.  Corresponding immunofixation showed an unremarkable pattern with no evidence of monoclonal protein apparent.  Patient was seen by Dr. Pee Knox with urology and underwent a prostate biopsy on 9/14/2020.  Pathology revealed adenocarcinoma, Ordway score 9/10 in 12/12 cores.  Per patient report, he had MRI and bone scan at [facility], bone scan was negative and MRI showed pelvic lymphadenopathy.    Repeat scans on 10/06/2020 showed increase in size of some of the lymph nodes.    Patient started on radiation for prostate cancer on 12/14/2020.      PET/CT scan on 3/4/2021 showed hypermetabolic lymphadenopathy in the neck, chest, and abdomen, some of which have increased in size most significantly in the chest.  There was interval decrease in size of pelvic lymphadenopathy.  He started on abiraterone and prednisone through Dr. Papito Callaway with radiation oncology on 5/7/2021.  Ibrutinib ordered on 5/25/2021 and started shortly after.    PET/CT scan on 8/4/2021 showed disease progression most notable in the chest and abdomen with Deauville score of 5.  Left axillary lymph node biopsy on 8/18/2021 showed transformation SLL to Hodgkin lymphoma    Baseline ECHO on 9/7/2021 showed EF of 60-65%.  Baseline PFT on 9/7/2021 normal.  We started ABVD on 9/8/2021.  She received ABVD for 3 cycles, dropped the bleomycin with cycle 4. Patient completed chemotherapy on 02/10/2022.    End of treatment PET/CT scan on 02/28/2022 revealed complete response, Deauville score 2-3.  Recent PET/CT scan  done at [facility] on 06/08/2022 revealed stable to interval increased size of multiple lymph nodes above and below the diaphragm that remain mildly avid for FDG with resolution of mild diffuse bone marrow uptake.  There was a new focal uptake associated with sclerotic changes near the superior endplate of L3.  PSMA PET/CT on 08/04/2022 showed retrocaval and right common iliac lymph nodes with moderate to intense uptake consistent with metastatic prostate cancer.  There are multiple additional lymph nodes above and below the diaphragm that have not significantly changed.  There was a sclerotic lesion at L3 consistent with metastatic disease and no other suspicious uptake.     PET/CT scan done on 12/2/2022 (compared to PET/CT from 08/04/2021) showed improved size and hypermetabolism of reference lymph nodes with skeletal hypermetabolism.  Then had a repeat PSMA PET/CT scan on 02/17/2023 that showed multiple lymph nodes involved and diffuse osseous involvement of prostate cancer.    Interval History:  Patient presents to clinic for scheduled treatment visit.  He started new regimen with cabazitaxel and carboplatin.  He does report fatigue and decreased appetite as well as some mild constipation.  He does have existing neuropathy that is maybe slightly worse.  He did have a lymph node biopsy done of the end of last week, pathology still pending.  He had some mild nausea but this resolved on its own without medication.    Past Medical History:   Diagnosis Date    Anemia     Colon polyp 12/05/2014    colonoscopy    Diffuse well-differentiated lymphocytic lymphoma     Dyslipidemia     GERD (gastroesophageal reflux disease)     Hodgkin lymphoma     Hypercalcemia     Hypertension     Neuropathy       Past Surgical History:   Procedure Laterality Date    BONE MARROW ASPIRATION  12/2022    COLONOSCOPY W/ POLYPECTOMY  12/05/2014    ENDOSCOPIC CARPAL TUNNEL RELEASE Left 05/05/2021    ENDOSCOPIC CARPAL TUNNEL RELEASE Right  06/27/2019    EXCISIONAL BIOPSY, LYMPH NODE Left 3/31/2023    Procedure: EXCISIONAL BIOPSY, LYMPH NODE/ LEFT SUPRACLAVICULAR NODE / WITH ULTRASOUND GUIDANCE;  Surgeon: Hugh Neri MD;  Location: [facility];  Service: Oncology;  Laterality: Left;    INGUINAL HERNIA REPAIR Left     KNEE SURGERY Left     MEDIPORT INSERTION, SINGLE  09/08/2021    skin cancer removal on head  02/09/2022    skin cancer removal on right leg  01/26/2022     Social History     Socioeconomic History    Marital status: Single   Tobacco Use    Smoking status: Former     Types: Cigarettes    Smokeless tobacco: Never    Tobacco comments:     stopped at age 16   Substance and Sexual Activity    Alcohol use: Not Currently     Comment: 1-2 times per month    Drug use: Yes     Types: Marijuana    Sexual activity: Not Currently      Family History   Problem Relation Age of Onset    Alzheimer's disease Mother     Arthritis Mother     Hypertension Mother     Cataracts Mother     Heart attack Father     Atrial fibrillation Brother       Review of patient's allergies indicates:  No Known Allergies   Review of Systems   Constitutional:  Positive for activity change and fatigue. Negative for chills, diaphoresis, fever and unexpected weight change.   HENT:  Negative for nasal congestion, mouth sores, sinus pressure/congestion and sore throat.    Eyes:  Negative for pain and visual disturbance.   Respiratory:  Negative for cough, chest tightness and shortness of breath.    Cardiovascular:  Negative for chest pain, palpitations and leg swelling.   Gastrointestinal:  Positive for constipation. Negative for abdominal distention, abdominal pain, blood in stool and diarrhea.   Genitourinary:  Negative for dysuria, frequency and hematuria.   Musculoskeletal:  Negative for arthralgias and back pain.   Integumentary:  Negative for rash.   Neurological:  Positive for numbness. Negative for dizziness, weakness and headaches.   Hematological:  Negative for adenopathy.    Psychiatric/Behavioral:  Negative for confusion.        Objective:      Physical Exam  Vitals reviewed.   Constitutional:       General: He is awake.      Appearance: Normal appearance.   HENT:      Head: Normocephalic and atraumatic.      Right Ear: Hearing normal.      Left Ear: Hearing normal.      Nose: Nose normal.   Eyes:      General: Lids are normal. Vision grossly intact.      Extraocular Movements: Extraocular movements intact.      Conjunctiva/sclera: Conjunctivae normal.   Cardiovascular:      Rate and Rhythm: Normal rate and regular rhythm.      Pulses: Normal pulses.      Heart sounds: Normal heart sounds.   Pulmonary:      Effort: Pulmonary effort is normal.      Breath sounds: Normal breath sounds. No wheezing, rhonchi or rales.   Musculoskeletal:      Cervical back: Full passive range of motion without pain.      Right lower leg: No edema.      Left lower leg: No edema.   Skin:     General: Skin is warm.   Neurological:      General: No focal deficit present.      Mental Status: He is alert and oriented to person, place, and time.   Psychiatric:         Attention and Perception: Attention normal.         Mood and Affect: Mood and affect normal.         Behavior: Behavior is cooperative.       LABS AND IMAGING REVIEWED IN EPIC          Assessment:     1.  Small lymphocytic lymphoma, Lugano stage III with a p53, 11q., and 13q. deletion.  2.  Bertha 9/10 prostate cancer in 12/12 core biopsies  3.  Symptomatic anemia        Plan:       Patient currently getting treated for his prostate cancer by Urology and Radiation Oncology.  He received 4 cycles of docetaxel, but continued to have increase in his PSA level and a PSMA PET/CT scan showed progression of disease on 02/17/2023.  Switched from docetaxel to cabazitaxel and carboplatin.    As for his Hodgkin lymphoma, most recent PET/CT essentially stable.  PET/CT scan on 12/06/2022 shows bone involvement, this is likely his prostate cancer.    Due to  multiple lymph nodes being involved, I will go ahead and get a biopsy of 1 of these to make sure this is not his lymphoma.  Biopsy was done last Friday      Patient will need a CBC, CMP, and PSA next week.    Return to clinic for TD visit and labs prior to Cycle 3      IZABEL Balderas

## 2023-04-04 NOTE — PROGRESS NOTES
[facility] F[facility]ed order for 2 cases 2 months Ensure Plus. Pt verbalized understanding.

## 2023-04-12 NOTE — TELEPHONE ENCOUNTER
[facility] [place]  [facility] to say that pt went over for pickup of supplements but would prefer Ensure Original to Ensure Plus.  took verbal order to change order to Ensure Original. She stated she will contact pt to let him know.

## 2023-04-12 NOTE — PROGRESS NOTES
POST OP EXC BIOPSY LEFT SUPRACLAVICULAR NODE    PT DOING WELL  GOT CHEMO LAST WEEK AND TELLS ME HE ACTUALLY FEELS BETTER  HE IS EATING  NO COMPLAINTS FROM SURGERY    INCISION HEALING WELL  NO SWELLING  NO SIGNS OF INFECTION    PATH: SMALL LYMPHOCYTIC LYMPHOMA, CLL  PATH DISCUSSED WITH PT AND QUESTIONS ANSWERED  HE WILL CONTINUE TO FOLLOW WITH MED ONC DR BRIGGS    HE IS APPRECIATIVE  KNOWS TO CALL WITH ANY PROBLEMS    RTC PRN

## 2023-04-25 NOTE — PROGRESS NOTES
"  Subjective:       Patient ID: Karl Duane Jeter is a 67 y.o. male.    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    Current Treatment:   Cabazitaxel 20 mg per m2 and carboplatin AUC of 4 every 3 weeks with growth factor support to started on 03/13/2023.    Treatment History:  SLL -- progressive lymphadenopathy on repeat PET 3/4/2021-- Started treatment with Imbruvica 420mg PO daily shortly after 5/25/2021.  Due to transformation of SLL to Hodgkin lymphoma, we started ABVD--- Cycle #1 on 9/8/2021.  Bleomycin dropped with cycle 4, cycle 6-day 15 given on 2/10/2022.  Status post radiation therapy, started 09/2022.  Docetaxel started on 11/10/2022 for prostate cancer.  Received cycle 4 day 1 on 01/12/2023.  Stopped due to progression  Patient also getting androgen deprivation therapy with Urology.    HPI:  Patient who noticed a lump in his neck in April 2020.  He presented to his PCP who ordered an ultrasound of the neck.  Ultrasound on 4/22/2020 showed prominent lymphadenopathy involving the supraclavicular regions bilaterally and bilateral cervical chains.  This is concerning for lymphoma or metastatic disease.    He was seen by Dr. Hugh Neri on 4/29/2020 and plan was made for excisional biopsy of the lymph node.  Excisional biopsy was done on 4/30/2020 and pathology revealed small lymphocytic lymphoma, CD5 positive, CD20 positive, and CD23 positive.  Cyclin D1 was negative.  FISH prognostic panel on the lymph node biopsy revealed a p53 deletion (17p13), a mono allelic deletion of [id number] (13q14), and a deletion of SAMMI (11q22.3).  He was referred to medical " oncology for further recommendations.    He presented initially to medical oncology on 5/18/2020.  CT scans of the neck, chest, abdomen, and pelvis were ordered at that time showed lymphadenopathy in the neck, chest, abdomen, and pelvis with mild hepatosplenomegaly.    Of note, due to an elevated PSA and total protein on 6/24/2020, the patient had protein electrophoresis ordered on his serum which was done on 6/25/2020 and revealed an M spike of 0.7.  Corresponding immunofixation showed an unremarkable pattern with no evidence of monoclonal protein apparent.  Patient was seen by Dr. Pee Knox with urology and underwent a prostate biopsy on 9/14/2020.  Pathology revealed adenocarcinoma, Mansfield score 9/10 in 12/12 cores.  Per patient report, he had MRI and bone scan at [facility], bone scan was negative and MRI showed pelvic lymphadenopathy.    Repeat scans on 10/06/2020 showed increase in size of some of the lymph nodes.    Patient started on radiation for prostate cancer on 12/14/2020.      PET/CT scan on 3/4/2021 showed hypermetabolic lymphadenopathy in the neck, chest, and abdomen, some of which have increased in size most significantly in the chest.  There was interval decrease in size of pelvic lymphadenopathy.  He started on abiraterone and prednisone through Dr. Papito Callaway with radiation oncology on 5/7/2021.  Ibrutinib ordered on 5/25/2021 and started shortly after.    PET/CT scan on 8/4/2021 showed disease progression most notable in the chest and abdomen with Deauville score of 5.  Left axillary lymph node biopsy on 8/18/2021 showed transformation SLL to Hodgkin lymphoma    Baseline ECHO on 9/7/2021 showed EF of 60-65%.  Baseline PFT on 9/7/2021 normal.  We started ABVD on 9/8/2021.  She received ABVD for 3 cycles, dropped the bleomycin with cycle 4. Patient completed chemotherapy on 02/10/2022.    End of treatment PET/CT scan on 02/28/2022 revealed complete response, Deauville score 2-3.  Recent PET/CT scan  done at [facility] on 06/08/2022 revealed stable to interval increased size of multiple lymph nodes above and below the diaphragm that remain mildly avid for FDG with resolution of mild diffuse bone marrow uptake.  There was a new focal uptake associated with sclerotic changes near the superior endplate of L3.  PSMA PET/CT on 08/04/2022 showed retrocaval and right common iliac lymph nodes with moderate to intense uptake consistent with metastatic prostate cancer.  There are multiple additional lymph nodes above and below the diaphragm that have not significantly changed.  There was a sclerotic lesion at L3 consistent with metastatic disease and no other suspicious uptake.     PET/CT scan done on 12/2/2022 (compared to PET/CT from 08/04/2021) showed improved size and hypermetabolism of reference lymph nodes with skeletal hypermetabolism.  Then had a repeat PSMA PET/CT scan on 02/17/2023 that showed multiple lymph nodes involved and diffuse osseous involvement of prostate cancer. Left supraclavicular lymph node biopsy on 03/31/2023 showed SLL.    Interval History:  Patient presents to clinic for scheduled treatment visit.  He started new regimen with cabazitaxel and carboplatin.  He states that he is doing great.  He has no pain.  He overall just feels well without any issues.      Past Medical History:   Diagnosis Date    Anemia     Colon polyp 12/05/2014    colonoscopy    Diffuse well-differentiated lymphocytic lymphoma     Dyslipidemia     GERD (gastroesophageal reflux disease)     Hodgkin lymphoma     Hypercalcemia     Hypertension     Neuropathy       Past Surgical History:   Procedure Laterality Date    BONE MARROW ASPIRATION  12/2022    COLONOSCOPY W/ POLYPECTOMY  12/05/2014    ENDOSCOPIC CARPAL TUNNEL RELEASE Left 05/05/2021    ENDOSCOPIC CARPAL TUNNEL RELEASE Right 06/27/2019    EXCISIONAL BIOPSY, LYMPH NODE Left 3/31/2023    Procedure: EXCISIONAL BIOPSY, LYMPH NODE/ LEFT SUPRACLAVICULAR NODE / WITH ULTRASOUND  GUIDANCE;  Surgeon: Hugh Neri MD;  Location: [facility];  Service: Oncology;  Laterality: Left;    INGUINAL HERNIA REPAIR Left     KNEE SURGERY Left     MEDIPORT INSERTION, SINGLE  09/08/2021    skin cancer removal on head  02/09/2022    skin cancer removal on right leg  01/26/2022     Social History     Socioeconomic History    Marital status: Single   Tobacco Use    Smoking status: Former     Types: Cigarettes    Smokeless tobacco: Never    Tobacco comments:     stopped at age 16   Substance and Sexual Activity    Alcohol use: Not Currently     Comment: 1-2 times per month    Drug use: Yes     Types: Marijuana    Sexual activity: Not Currently      Family History   Problem Relation Age of Onset    Alzheimer's disease Mother     Arthritis Mother     Hypertension Mother     Cataracts Mother     Heart attack Father     Atrial fibrillation Brother       Review of patient's allergies indicates:  No Known Allergies   Review of Systems   Constitutional:  Positive for activity change and fatigue. Negative for chills, diaphoresis, fever and unexpected weight change.   HENT:  Negative for nasal congestion, mouth sores, sinus pressure/congestion and sore throat.    Eyes:  Negative for pain and visual disturbance.   Respiratory:  Negative for cough, chest tightness and shortness of breath.    Cardiovascular:  Negative for chest pain, palpitations and leg swelling.   Gastrointestinal:  Positive for constipation. Negative for abdominal distention, abdominal pain, blood in stool and diarrhea.   Genitourinary:  Negative for dysuria, frequency and hematuria.   Musculoskeletal:  Negative for arthralgias and back pain.   Integumentary:  Negative for rash.   Neurological:  Positive for numbness. Negative for dizziness, weakness and headaches.   Hematological:  Negative for adenopathy.   Psychiatric/Behavioral:  Negative for confusion.        Objective:      Physical Exam  Vitals reviewed.   Constitutional:       General: He is  awake.      Appearance: Normal appearance.   HENT:      Head: Normocephalic and atraumatic.      Right Ear: Hearing normal.      Left Ear: Hearing normal.      Nose: Nose normal.   Eyes:      General: Lids are normal. Vision grossly intact.      Extraocular Movements: Extraocular movements intact.      Conjunctiva/sclera: Conjunctivae normal.   Cardiovascular:      Rate and Rhythm: Normal rate and regular rhythm.      Pulses: Normal pulses.      Heart sounds: Normal heart sounds.   Pulmonary:      Effort: Pulmonary effort is normal.      Breath sounds: Normal breath sounds. No wheezing, rhonchi or rales.   Musculoskeletal:      Cervical back: Full passive range of motion without pain.      Right lower leg: No edema.      Left lower leg: No edema.   Skin:     General: Skin is warm.   Neurological:      General: No focal deficit present.      Mental Status: He is alert and oriented to person, place, and time.   Psychiatric:         Attention and Perception: Attention normal.         Mood and Affect: Mood and affect normal.         Behavior: Behavior is cooperative.       LABS AND IMAGING REVIEWED IN EPIC          Assessment:     1.  Small lymphocytic lymphoma, Lugano stage III with a p53, 11q., and 13q. deletion.  2.  Bertha 9/10 prostate cancer in 12/12 core biopsies  3.  Symptomatic anemia        Plan:       Patient currently getting treated for his prostate cancer by Urology and Radiation Oncology.  He received 4 cycles of docetaxel, but continued to have increase in his PSA level and a PSMA PET/CT scan showed progression of disease on 02/17/2023.  Switched from docetaxel to cabazitaxel and carboplatin.    As for his Hodgkin lymphoma, most recent PET/CT essentially stable.  PET/CT scan on 12/06/2022 shows bone involvement, this is likely his prostate cancer.    Biopsy on 03/31/2023 showed small lymphocytic lymphoma.    We have to hold his carboplatin and cabazitaxel on 04/27/2023 due to thrombocytopenia.  He will  return to clinic on 05/04/2023, have a repeat CBC with plans to treat if his platelets have increased.  I did explain to the patient that we have a national shortage and carboplatin and that we would have to treat with cabazitaxel alone until we get a supply of carboplatin.  He voiced understanding.  His treatment on 05/04/2023 will likely be cabazitaxel alone.      Return to clinic for TD visit and labs prior to Cycle 4, I set this up for 4 weeks since we had to delay his therapy by a week.  I will likely order scans after cycle 4.    Labs: CBC, CMP, and PSA      Pedro Sol II, MD

## 2023-05-24 NOTE — PROGRESS NOTES
"  Subjective:       Patient ID: Karl Duane Jeter is a 67 y.o. male.    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    Current Treatment:   Cabazitaxel 20 mg per m2 and carboplatin AUC of 4 every 3 weeks with growth factor support to started on 03/13/2023.    Treatment History:  SLL -- progressive lymphadenopathy on repeat PET 3/4/2021-- Started treatment with Imbruvica 420mg PO daily shortly after 5/25/2021.  Due to transformation of SLL to Hodgkin lymphoma, we started ABVD--- Cycle #1 on 9/8/2021.  Bleomycin dropped with cycle 4, cycle 6-day 15 given on 2/10/2022.  Status post radiation therapy, started 09/2022.  Docetaxel started on 11/10/2022 for prostate cancer.  Received cycle 4 day 1 on 01/12/2023.  Stopped due to progression  Patient also getting androgen deprivation therapy with Urology.    HPI:  Patient who noticed a lump in his neck in April 2020.  He presented to his PCP who ordered an ultrasound of the neck.  Ultrasound on 4/22/2020 showed prominent lymphadenopathy involving the supraclavicular regions bilaterally and bilateral cervical chains.  This is concerning for lymphoma or metastatic disease.    He was seen by Dr. Hugh Neri on 4/29/2020 and plan was made for excisional biopsy of the lymph node.  Excisional biopsy was done on 4/30/2020 and pathology revealed small lymphocytic lymphoma, CD5 positive, CD20 positive, and CD23 positive.  Cyclin D1 was negative.  FISH prognostic panel on the lymph node biopsy revealed a p53 deletion (17p13), a mono allelic deletion of [id number] (13q14), and a deletion of SAMMI (11q22.3).  He was referred to medical " oncology for further recommendations.    He presented initially to medical oncology on 5/18/2020.  CT scans of the neck, chest, abdomen, and pelvis were ordered at that time showed lymphadenopathy in the neck, chest, abdomen, and pelvis with mild hepatosplenomegaly.    Of note, due to an elevated PSA and total protein on 6/24/2020, the patient had protein electrophoresis ordered on his serum which was done on 6/25/2020 and revealed an M spike of 0.7.  Corresponding immunofixation showed an unremarkable pattern with no evidence of monoclonal protein apparent.  Patient was seen by Dr. Pee Knox with urology and underwent a prostate biopsy on 9/14/2020.  Pathology revealed adenocarcinoma, [place] score 9/10 in 12/12 cores.  Per patient report, he had MRI and bone scan at [facility], bone scan was negative and MRI showed pelvic lymphadenopathy.    Repeat scans on 10/06/2020 showed increase in size of some of the lymph nodes.    Patient started on radiation for prostate cancer on 12/14/2020.      PET/CT scan on 3/4/2021 showed hypermetabolic lymphadenopathy in the neck, chest, and abdomen, some of which have increased in size most significantly in the chest.  There was interval decrease in size of pelvic lymphadenopathy.  He started on abiraterone and prednisone through Dr. Papito Callaway with radiation oncology on 5/7/2021.  Ibrutinib ordered on 5/25/2021 and started shortly after.    PET/CT scan on 8/4/2021 showed disease progression most notable in the chest and abdomen with Deauville score of 5.  Left axillary lymph node biopsy on 8/18/2021 showed transformation SLL to Hodgkin lymphoma    Baseline ECHO on 9/7/2021 showed EF of 60-65%.  Baseline PFT on 9/7/2021 normal.  We started ABVD on 9/8/2021.  She received ABVD for 3 cycles, dropped the bleomycin with cycle 4. Patient completed chemotherapy on 02/10/2022.    End of treatment PET/CT scan on 02/28/2022 revealed complete response, Deauville score 2-3.  Recent PET/CT scan  done [facility] on 06/08/2022 revealed stable to interval increased size of multiple lymph nodes above and below the diaphragm that remain mildly avid for FDG with resolution of mild diffuse bone marrow uptake.  There was a new focal uptake associated with sclerotic changes near the superior endplate of L3.  PSMA PET/CT on 08/04/2022 showed retrocaval and right common iliac lymph nodes with moderate to intense uptake consistent with metastatic prostate cancer.  There are multiple additional lymph nodes above and below the diaphragm that have not significantly changed.  There was a sclerotic lesion at L3 consistent with metastatic disease and no other suspicious uptake.     PET/CT scan done on 12/2/2022 (compared to PET/CT from 08/04/2021) showed improved size and hypermetabolism of reference lymph nodes with skeletal hypermetabolism.  Then had a repeat PSMA PET/CT scan on 02/17/2023 that showed multiple lymph nodes involved and diffuse osseous involvement of prostate cancer. Left supraclavicular lymph node biopsy on 03/31/2023 showed SLL.    Interval History:  Patient presents to clinic for scheduled treatment visit.  He started new regimen with cabazitaxel and carboplatin.  Last cycle he received cabazitaxel only due to national carboplatin shortage.  He states that he is doing good.  He states that for a couple days prior to his next dose of treatment he does start to have some mild aches but this resolves with his treatment.  He is also able to manage the pain with Aleve.  He overall just feels well without any issues.  He denies any bleeding.      Past Medical History:   Diagnosis Date    Anemia     Colon polyp 12/05/2014    colonoscopy    Diffuse well-differentiated lymphocytic lymphoma     Dyslipidemia     GERD (gastroesophageal reflux disease)     Hodgkin lymphoma     Hypercalcemia     Hypertension     Neuropathy       Past Surgical History:   Procedure Laterality Date    BONE MARROW ASPIRATION  12/2022     COLONOSCOPY W/ POLYPECTOMY  12/05/2014    ENDOSCOPIC CARPAL TUNNEL RELEASE Left 05/05/2021    ENDOSCOPIC CARPAL TUNNEL RELEASE Right 06/27/2019    EXCISIONAL BIOPSY, LYMPH NODE Left 3/31/2023    Procedure: EXCISIONAL BIOPSY, LYMPH NODE/ LEFT SUPRACLAVICULAR NODE / WITH ULTRASOUND GUIDANCE;  Surgeon: Hugh Neri MD;  Location: [facility];  Service: Oncology;  Laterality: Left;    INGUINAL HERNIA REPAIR Left     KNEE SURGERY Left     MEDIPORT INSERTION, SINGLE  09/08/2021    skin cancer removal on head  02/09/2022    skin cancer removal on right leg  01/26/2022     Social History     Socioeconomic History    Marital status: Single   Tobacco Use    Smoking status: Former     Types: Cigarettes    Smokeless tobacco: Never    Tobacco comments:     stopped at age 16   Substance and Sexual Activity    Alcohol use: Not Currently     Comment: 1-2 times per month    Drug use: Yes     Types: Marijuana    Sexual activity: Not Currently      Family History   Problem Relation Age of Onset    Alzheimer's disease Mother     Arthritis Mother     Hypertension Mother     Cataracts Mother     Heart attack Father     Atrial fibrillation Brother       Review of patient's allergies indicates:  No Known Allergies   Review of Systems   Constitutional:  Positive for activity change and fatigue. Negative for chills, diaphoresis, fever and unexpected weight change.   HENT:  Negative for nasal congestion, mouth sores, sinus pressure/congestion and sore throat.    Eyes:  Negative for pain and visual disturbance.   Respiratory:  Negative for cough, chest tightness and shortness of breath.    Cardiovascular:  Negative for chest pain, palpitations and leg swelling.   Gastrointestinal:  Positive for constipation. Negative for abdominal distention, abdominal pain, blood in stool and diarrhea.   Genitourinary:  Negative for dysuria, frequency and hematuria.   Musculoskeletal:  Negative for arthralgias and back pain.   Integumentary:  Negative for  rash.   Neurological:  Positive for numbness. Negative for dizziness, weakness and headaches.   Hematological:  Negative for adenopathy.   Psychiatric/Behavioral:  Negative for confusion.        Objective:      Physical Exam  Vitals reviewed.   Constitutional:       General: He is awake.      Appearance: Normal appearance.   HENT:      Head: Normocephalic and atraumatic.      Right Ear: Hearing normal.      Left Ear: Hearing normal.      Nose: Nose normal.   Eyes:      General: Lids are normal. Vision grossly intact.      Extraocular Movements: Extraocular movements intact.      Conjunctiva/sclera: Conjunctivae normal.   Cardiovascular:      Rate and Rhythm: Normal rate and regular rhythm.      Pulses: Normal pulses.      Heart sounds: Normal heart sounds.   Pulmonary:      Effort: Pulmonary effort is normal.      Breath sounds: Normal breath sounds. No wheezing, rhonchi or rales.   Musculoskeletal:      Cervical back: Full passive range of motion without pain.      Right lower leg: No edema.      Left lower leg: No edema.   Lymphadenopathy:      Upper Body:      Right upper body: No axillary adenopathy.      Left upper body: Supraclavicular adenopathy present. No axillary adenopathy.   Skin:     General: Skin is warm.   Neurological:      General: No focal deficit present.      Mental Status: He is alert and oriented to person, place, and time.   Psychiatric:         Attention and Perception: Attention normal.         Mood and Affect: Mood and affect normal.         Behavior: Behavior is cooperative.       LABS AND IMAGING REVIEWED IN EPIC          Assessment:     1.  Small lymphocytic lymphoma, Lugano stage III with a p53, 11q., and 13q. deletion.  2.  [place] 9/10 prostate cancer in 12/12 core biopsies  3.  Symptomatic anemia        Plan:       Patient currently getting treated for his prostate cancer by Urology and Radiation Oncology.  He received 4 cycles of docetaxel, but continued to have increase in his PSA  level and a PSMA PET/CT scan showed progression of disease on 02/17/2023.  Switched from docetaxel to cabazitaxel and carboplatin.    As for his Hodgkin lymphoma, most recent PET/CT essentially stable.  PET/CT scan on 12/06/2022 shows bone involvement, this is likely his prostate cancer.    Biopsy on 03/31/2023 showed small lymphocytic lymphoma.    We have to hold his carboplatin and cabazitaxel on 04/27/2023 due to thrombocytopenia.  He will return to clinic on 05/04/2023, have a repeat CBC with plans to treat if his platelets have increased.  I did explain to the patient that we have a national shortage and carboplatin and that we would have to treat with cabazitaxel alone until we get a supply of carboplatin.  He voiced understanding.  This cycle will also be cabazitaxel alone.      Return to clinic for TD visit and labs prior to Cycle 5, repeat scans prior to next visit    Labs: CBC, CMP, and PSA      IZABEL Balderas

## 2023-06-14 NOTE — TELEPHONE ENCOUNTER
Magan, this is MaryA lice Ayon, clinical pharmacist with [facility] that is part of your care team.  We have begun working on your prescription that your doctor has sent us. Our next steps include:     Working with your insurance company to obtain approval for your medication  Working with you to ensure your medication is affordable     We will be calling you along the way with updates on your medication but if you have any concerns or receive information that you would like to discuss please reach us at [phone].    Welcome call outcome: Patient/caregiver reached

## 2023-06-14 NOTE — PROGRESS NOTES
"  Subjective:       Patient ID: Karl Duane Jeter is a 67 y.o. male.    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    Current Treatment:   Ordered enzalutamide on 06/14/2023 as a bridge to Pluvicto    Treatment History:  SLL -- progressive lymphadenopathy on repeat PET 3/4/2021-- Started treatment with Imbruvica 420mg PO daily shortly after 5/25/2021.  Due to transformation of SLL to Hodgkin lymphoma, we started ABVD--- Cycle #1 on 9/8/2021.  Bleomycin dropped with cycle 4, cycle 6-day 15 given on 2/10/2022.  Status post radiation therapy, started 09/2022.  Docetaxel started on 11/10/2022 for prostate cancer.  Received cycle 4 day 1 on 01/12/2023.  Stopped due to progression  Patient also getting androgen deprivation therapy with Urology.  Cabazitaxel 20 mg per m2 and carboplatin AUC of 4 every 3 weeks with growth factor support to started on 03/13/2023.    HPI:  Patient who noticed a lump in his neck in April 2020.  He presented to his PCP who ordered an ultrasound of the neck.  Ultrasound on 4/22/2020 showed prominent lymphadenopathy involving the supraclavicular regions bilaterally and bilateral cervical chains.  This is concerning for lymphoma or metastatic disease.    He was seen by Dr. Hugh Neri on 4/29/2020 and plan was made for excisional biopsy of the lymph node.  Excisional biopsy was done on 4/30/2020 and pathology revealed small lymphocytic lymphoma, CD5 positive, CD20 positive, and CD23 positive.  Cyclin D1 was negative.  FISH prognostic panel on the lymph node biopsy revealed a p53 deletion (17p13), a mono allelic deletion of [id number] (13q14), and a " deletion of SAMMI (11q22.3).  He was referred to medical oncology for further recommendations.    He presented initially to medical oncology on 5/18/2020.  CT scans of the neck, chest, abdomen, and pelvis were ordered at that time showed lymphadenopathy in the neck, chest, abdomen, and pelvis with mild hepatosplenomegaly.    Of note, due to an elevated PSA and total protein on 6/24/2020, the patient had protein electrophoresis ordered on his serum which was done on 6/25/2020 and revealed an M spike of 0.7.  Corresponding immunofixation showed an unremarkable pattern with no evidence of monoclonal protein apparent.  Patient was seen by Dr. Pee Knox with urology and underwent a prostate biopsy on 9/14/2020.  Pathology revealed adenocarcinoma, Bertha score 9/10 in 12/12 cores.  Per patient report, he had MRI and bone scan at [facility], bone scan was negative and MRI showed pelvic lymphadenopathy.    Repeat scans on 10/06/2020 showed increase in size of some of the lymph nodes.    Patient started on radiation for prostate cancer on 12/14/2020.      PET/CT scan on 3/4/2021 showed hypermetabolic lymphadenopathy in the neck, chest, and abdomen, some of which have increased in size most significantly in the chest.  There was interval decrease in size of pelvic lymphadenopathy.  He started on abiraterone and prednisone through Dr. Papito Callaway with radiation oncology on 5/7/2021.  Ibrutinib ordered on 5/25/2021 and started shortly after.    PET/CT scan on 8/4/2021 showed disease progression most notable in the chest and abdomen with Deauville score of 5.  Left axillary lymph node biopsy on 8/18/2021 showed transformation SLL to Hodgkin lymphoma    Baseline ECHO on 9/7/2021 showed EF of 60-65%.  Baseline PFT on 9/7/2021 normal.  We started ABVD on 9/8/2021.  She received ABVD for 3 cycles, dropped the bleomycin with cycle 4. Patient completed chemotherapy on 02/10/2022.    End of treatment PET/CT scan on 02/28/2022 revealed  complete response, Deauville score 2-3.  PET/CT scan done at [facility] on 06/08/2022 revealed stable to interval increased size of multiple lymph nodes above and below the diaphragm that remain mildly avid for FDG with resolution of mild diffuse bone marrow uptake.  There was a new focal uptake associated with sclerotic changes near the superior endplate of L3.  PSMA PET/CT on 08/04/2022 showed retrocaval and right common iliac lymph nodes with moderate to intense uptake consistent with metastatic prostate cancer.  There are multiple additional lymph nodes above and below the diaphragm that have not significantly changed.  There was a sclerotic lesion at L3 consistent with metastatic disease and no other suspicious uptake.     PET/CT scan done on 12/2/2022 (compared to PET/CT from 08/04/2021) showed improved size and hypermetabolism of reference lymph nodes with skeletal hypermetabolism.  Then had a repeat PSMA PET/CT scan on 02/17/2023 that showed multiple lymph nodes involved and diffuse osseous involvement of prostate cancer. Left supraclavicular lymph node biopsy on 03/31/2023 showed SLL.    PSMA PET/CT scan done on 6/8/2023 was overall progression of disease in the bones with liver lesions and worsening disease in the lymph nodes.    Interval History:  Patient presents to clinic for scheduled treatment visit to review scan results.  He states that around this time in his treatment course, he starts to feel bad.  He is disappointed with the news of his scan results showing progression, but he is ready for the next step in therapy.        Past Medical History:   Diagnosis Date    Anemia     Colon polyp 12/05/2014    colonoscopy    Diffuse well-differentiated lymphocytic lymphoma     Dyslipidemia     GERD (gastroesophageal reflux disease)     Hodgkin lymphoma     Hypercalcemia     Hypertension     Neuropathy       Past Surgical History:   Procedure Laterality Date    BONE MARROW ASPIRATION  12/2022    COLONOSCOPY  W/ POLYPECTOMY  12/05/2014    ENDOSCOPIC CARPAL TUNNEL RELEASE Left 05/05/2021    ENDOSCOPIC CARPAL TUNNEL RELEASE Right 06/27/2019    EXCISIONAL BIOPSY, LYMPH NODE Left 3/31/2023    Procedure: EXCISIONAL BIOPSY, LYMPH NODE/ LEFT SUPRACLAVICULAR NODE / WITH ULTRASOUND GUIDANCE;  Surgeon: Hugh Neri MD;  Location: [facility];  Service: Oncology;  Laterality: Left;    INGUINAL HERNIA REPAIR Left     KNEE SURGERY Left     MEDIPORT INSERTION, SINGLE  09/08/2021    skin cancer removal on head  02/09/2022    skin cancer removal on right leg  01/26/2022     Social History     Socioeconomic History    Marital status: Single   Tobacco Use    Smoking status: Former     Types: Cigarettes    Smokeless tobacco: Never    Tobacco comments:     stopped at age 16   Substance and Sexual Activity    Alcohol use: Not Currently     Comment: 1-2 times per month    Drug use: Yes     Types: Marijuana    Sexual activity: Not Currently      Family History   Problem Relation Age of Onset    Alzheimer's disease Mother     Arthritis Mother     Hypertension Mother     Cataracts Mother     Heart attack Father     Atrial fibrillation Brother       Review of patient's allergies indicates:  No Known Allergies   Review of Systems   Constitutional:  Positive for activity change and fatigue. Negative for chills, diaphoresis, fever and unexpected weight change.   HENT:  Negative for nasal congestion, mouth sores, sinus pressure/congestion and sore throat.    Eyes:  Negative for pain and visual disturbance.   Respiratory:  Negative for cough, chest tightness and shortness of breath.    Cardiovascular:  Negative for chest pain, palpitations and leg swelling.   Gastrointestinal:  Positive for constipation. Negative for abdominal distention, abdominal pain, blood in stool and diarrhea.   Genitourinary:  Negative for dysuria, frequency and hematuria.   Musculoskeletal:  Negative for arthralgias and back pain.   Integumentary:  Negative for rash.    Neurological:  Positive for numbness. Negative for dizziness, weakness and headaches.   Hematological:  Negative for adenopathy.   Psychiatric/Behavioral:  Negative for confusion.        Objective:      Physical Exam  Vitals reviewed.   Constitutional:       General: He is awake.      Appearance: Normal appearance.   HENT:      Head: Normocephalic and atraumatic.      Right Ear: Hearing normal.      Left Ear: Hearing normal.      Nose: Nose normal.   Eyes:      General: Lids are normal. Vision grossly intact.      Extraocular Movements: Extraocular movements intact.      Conjunctiva/sclera: Conjunctivae normal.   Cardiovascular:      Rate and Rhythm: Normal rate and regular rhythm.      Pulses: Normal pulses.      Heart sounds: Normal heart sounds.   Pulmonary:      Effort: Pulmonary effort is normal.      Breath sounds: Normal breath sounds. No wheezing, rhonchi or rales.   Musculoskeletal:      Cervical back: Full passive range of motion without pain.      Right lower leg: No edema.      Left lower leg: No edema.   Lymphadenopathy:      Upper Body:      Right upper body: No axillary adenopathy.      Left upper body: Supraclavicular adenopathy present. No axillary adenopathy.   Skin:     General: Skin is warm.   Neurological:      General: No focal deficit present.      Mental Status: He is alert and oriented to person, place, and time.   Psychiatric:         Attention and Perception: Attention normal.         Mood and Affect: Mood and affect normal.         Behavior: Behavior is cooperative.       LABS AND IMAGING REVIEWED IN EPIC          Assessment:     1.  Small lymphocytic lymphoma, Lugano stage III with a p53, 11q., and 13q. deletion.  2.  [place] 9/10 prostate cancer in 12/12 core biopsies  3.  Symptomatic anemia        Plan:       Patient unfortunately had progression on his PSMA PET/CT scan on 06/08/2023.  We will stop giving cabazitaxel, had to hold carboplatin due to national shortage.    Patient has  not seen enzalutamide, I will call in enzalutamide as a bridge to hopeful Pluvicto in the near future.  I will get the patient back with Dr. Callaway and discuss this case.    As for his Hodgkin lymphoma, most recent PET/CT essentially stable.  Biopsy on 03/31/2023 showed small lymphocytic lymphoma.    Will follow up with radiation oncology, Dr. Callaway     Will send in enzalutamide to [facility]     Return to clinic in 4 weeks.    Labs: CBC, CMP, and PSA      Pedro Sol II, MD

## 2023-06-19 NOTE — TELEPHONE ENCOUNTER
Specialty Pharmacy - Initial Clinical Assessment    Specialty Medication Orders Linked to Encounter      Flowsheet Row Most Recent Value   Medication #1 enzalutamide (XTANDI) 40 mg Cap (Order#[id number], Rx#[id number])          Patient Diagnosis   C61 - Malignant neoplasm of prostate    Subjective    Karl Duane Jeter is a 67 y.o. male, who is followed by the specialty pharmacy service for management and education.    Recent Encounters       Date Type Provider Description    2023 Specialty Pharmacy Mary Alice Ayon PharmD Initial Clinical Assessment    2023 Specialty Pharmacy Mary Alice Ayon PharmD Referral Authorization            Current Outpatient Medications   Medication Sig    amLODIPine (NORVASC) 10 MG tablet TAKE ONE TABLET BY MOUTH ONCE DAILY    calcium carbonate (OS-ESTUARDO) 500 mg calcium (1,250 mg) tablet Take 1 tablet by mouth once daily.    enzalutamide (XTANDI) 40 mg Cap Take 4 capsules (160 mg total) by mouth once daily.    ergocalciferol, vitamin D2, (VITAMIN D ORAL) Take by mouth once daily.    HYDROcodone-acetaminophen (NORCO) 5-325 mg per tablet Take 1 tablet by mouth every 6 (six) hours as needed. (Patient not taking: Reported on 2023)    naproxen sodium 220 mg Cap Take 2 capsules by mouth as needed.    NIACIN ORAL Take by mouth once daily.    polyethylene glycol 3350 (MIRALAX ORAL) Take 17 g by mouth once daily.    pyridoxine HCl, vitamin B6, (VITAMIN B-6 ORAL) Take by mouth once daily.    tamsulosin (FLOMAX) 0.4 mg Cap Take 0.4 mg by mouth once daily.    vitamin B complex (B COMPLEX VITAMINS ORAL) Take by mouth once daily.   Last reviewed on 2023  1:46 PM by Mary Alice Ayon PharmD    Review of patient's allergies indicates:  No Known AllergiesLast reviewed on  2023 1:46 PM by Mary Alice Ayon    Drug Interactions    Drug interactions evaluated: yes  Clinically relevant drug interactions identified: yes   Interactions list: Cat C: Xtandi and amlodipine - CY Inducers  (Strong) may decrease the serum concentration of AmLODIPine    Cat C: Xtandi and hydrocodone - CY Inducers (Strong) may decrease the serum concentration of HYDROcodone     Drug management plan: Cat C: Xtandi and amlodipine - Monitor for reduced amlodipine efficacy (eg, loss of blood pressure control) when combined with strong CY inducers    Cat C: Xtandi and hydrocodone - In patients taking hydrocodone, monitor for decreased effects and withdrawal symptoms if a strong CY inducer is initiated. Conversely, monitor patients for increased hydrocodone toxicities, including fatal respiratory depression, if a strong CY inducer is discontinued. Consider hydrocodone dose adjustments until stable effects of the CY inducer (either addition or removal) are achieved.            Adverse Effects    Appetite change: Pos  Fatigue: Pos  Constipation: Pos  Numbness: Pos  Skin: System reviewed and is negative  Eyes: System reviewed and is negative  Endocrine and Metabolic: System reviewed and is negative  Genitourinary: System reviewed and is negative  Cardiovascular: System reviewed and is negative  Hematologic: System reviewed and is negative  Musculoskeletal: System reviewed and is negative  HENT: System reviewed and is negative  Psychiatric: System reviewed and is negative  Respiratory: System reviewed and is negative  *All other systems reviewed and are negative       Assessment Questions - Documented Responses      Flowsheet Row Most Recent Value   Assessment    Medication Reconciliation completed for patient Yes   During the past 4 weeks, has patient missed any activities due to condition or medication? No   During the past 4 weeks, did patient have any of the following urgent care visits? None   Goals of Therapy Status Discussed (new start)   Status of the patients ability to self-administer: Is Able   All education points have been covered with patient? Yes, supplemental printed education provided  "  Welcome packet contents reviewed and discussed with patient? Yes   Assesment completed? Yes   Plan Therapy being initiated   Do you need to open a clinical intervention (i-vent)? No   Do you want to schedule first shipment? Yes   Medication #1 Assessment Info    Patient status New medication, New to [facility]   Is this medication appropriate for the patient? Yes   Is this medication effective? Not yet started          Refill Questions - Documented Responses      Flowsheet Row Most Recent Value   Refill Screening Questions    When does the patient need to receive the medication? 06/21/23   Refill Delivery Questions    How will the patient receive the medication? MED[facility]   When does the patient need to receive the medication? 06/21/23   Shipping Address Home   Address in [facility] confirmed and updated if neccessary? Yes   Expected Copay ($) 100   Is the patient able to afford the medication copay? Yes   Payment Method new CC added to file   Days supply of Refill 30   Supplies needed? No supplies needed   Refill activity completed? Yes   Refill activity plan Refill scheduled   Shipment/Pickup Date: 06/20/23            Objective    He has a past medical history of Anemia, Colon polyp (12/05/2014), Diffuse well-differentiated lymphocytic lymphoma, Dyslipidemia, GERD (gastroesophageal reflux disease), Hodgkin lymphoma, Hypercalcemia, Hypertension, and Neuropathy.    Tried/failed medications: Docetaxel, Cabazitaxel/carboplatin    BP Readings from Last 4 Encounters:   06/14/23 110/74   05/25/23 103/67   05/24/23 109/71   05/04/23 110/64     Ht Readings from Last 4 Encounters:   06/14/23 5' 11" (1.803 m)   05/24/23 5' 11" (1.803 m)   04/26/23 5' 11" (1.803 m)   04/05/23 5' 11" (1.803 m)     Wt Readings from Last 4 Encounters:   06/14/23 81.3 kg (179 lb 3.2 oz)   05/25/23 83.5 kg (184 lb)   05/24/23 83.5 kg (184 lb)   04/26/23 83.3 kg (183 lb 9.6 oz)     Recent Labs   Lab Result Units 06/14/23  1000 05/24/23  0825 " 05/04/23  0852 05/04/23  0841 04/26/23  0941   RBC x10(6)/mcL 2.47 L 2.62 L  --  2.71 L 2.71 L   Hgb g/dL 7.9 L 8.3 L  --  8.2 L 8.1 L   Hct % 26.0 L 28.0 L  --  27.3 L 27.1 L   WBC x10(3)/mcL 5.88 5.98  --  4.61 4.6   Platelet x10(3)/mcL 79 L 101 L  --  106 L 60 L   Sodium Level mmol/L 137 138 139  --  140   Potassium Level mmol/L 4.0 3.9 3.9  --  3.8   Blood Urea Nitrogen mg/dL 8.8 7.6 L 8.4  --  7.3 L   Creatinine mg/dL 0.82 0.78 0.73  --  0.66 L   Calcium Level Total mg/dL 8.3 L 8.9 9.2  --  9.0   Albumin Level g/dL 3.7 3.7 3.6  --  3.7   Bilirubin Total mg/dL 0.4 0.4 0.4  --  0.3   Alkaline Phosphatase unit/L 179 H 200 H 219 H  --  199 H   Aspartate Aminotransferase unit/L 124 H 92 H 73 H  --  73 H   Alanine Aminotransferase unit/L 24 24 29  --  35     The goals of cancer treatment include:  Achieving remission of cancer, if possible  Reducing tumor size and spread of cancer, if remission is not possible  Minimizing pain and symptoms of the cancer  Preventing infection and other complications of treatment  Promoting adequate nutrition  Encouraging proper hydration  Improving or maintaining quality of life  Maintaining optimal therapy adherence  Minimizing and managing side effects    Goals of Therapy Status: Discussed (new start)    Assessment/Plan  Patient plans to start therapy on 06/21/23      Indication, dosage, appropriateness, effectiveness, safety and convenience of his specialty medication(s) were reviewed today.     Patient Education   Patient received education on the following:   Expectations and possible outcomes of therapy  Proper use, timely administration, and missed dose management  Duration of therapy  Side effects, including prevention, minimization, and management  Contraindications and safety precautions  New or changed medications, including prescribe and over the counter medications and supplements  Reviews recommended vaccinations, as appropriate  Storage, safe handling, and  disposal        Tasks added this encounter   No tasks added.   Tasks due within next 3 months   6/19/2023 - Initial Clinical Assessment/Patient Education (180 Day Reassessment)  6/19/2023 - Set up Initial Fill     Mary Alice Ayon, PharmD  [facility]  [address]  [facility] [address]  Phone: [phone]  Fax: [phone]

## 2023-06-28 NOTE — TELEPHONE ENCOUNTER
Patient approved to receive Xtandi at no charge through [employer] from 6/22/23/ - 12/31/23. Patient may call [phone] to schedule first shipment. Dispensing pharmacy is [facility]    Outgoing call to patient to inform of PAP approval and provided info to schedule shipment

## 2023-07-13 PROBLEM — D69.6 THROMBOCYTOPENIA, UNSPECIFIED: Status: ACTIVE | Noted: 2023-01-01

## 2023-07-13 NOTE — PROGRESS NOTES
"  Subjective:       Patient ID: Karl Duane Jeter is a 67 y.o. male.    Chief Complaint: "My pain has subsided"    Referring Physician:  Hguh Neri MD  PCP:  Tory Batista MD  Urology: Pee Knox MD  Radiation Oncology: Tommy Callaway MD        Diagnosis:  Small lymphocytic lymphoma, Lugano stage III  Bertha 9/10 prostate cancer in 12/12 core biopsies  Transformation of small lymphocytic lymphoma to Hodgkin's lymphoma as proved by biopsy done on 8/18/2021.    Current Treatment:   Ordered enzalutamide on 06/14/2023 as a bridge to Pluvicto    Treatment History:  SLL -- progressive lymphadenopathy on repeat PET 3/4/2021-- Started treatment with Imbruvica 420mg PO daily shortly after 5/25/2021.  Due to transformation of SLL to Hodgkin lymphoma, we started ABVD--- Cycle #1 on 9/8/2021.  Bleomycin dropped with cycle 4, cycle 6-day 15 given on 2/10/2022.  Status post radiation therapy, started 09/2022.  Docetaxel started on 11/10/2022 for prostate cancer.  Received cycle 4 day 1 on 01/12/2023.  Stopped due to progression  Patient also getting androgen deprivation therapy with Urology.  Cabazitaxel 20 mg per m2 and carboplatin AUC of 4 every 3 weeks with growth factor support to started on 03/13/2023.    HPI:  Patient who noticed a lump in his neck in April 2020.  He presented to his PCP who ordered an ultrasound of the neck.  Ultrasound on 4/22/2020 showed prominent lymphadenopathy involving the supraclavicular regions bilaterally and bilateral cervical chains.  This is concerning for lymphoma or metastatic disease.    He was seen by Dr. Hugh Neri on 4/29/2020 and plan was made for excisional biopsy of the lymph node.  Excisional biopsy was done on 4/30/2020 and pathology revealed small lymphocytic lymphoma, CD5 positive, CD20 positive, and CD23 positive.  Cyclin D1 was negative.  FISH prognostic panel on the lymph node biopsy revealed a p53 deletion (17p13), a mono allelic deletion of [id number] (13q14), and a " deletion of SAMMI (11q22.3).  He was referred to medical oncology for further recommendations.    He presented initially to medical oncology on 5/18/2020.  CT scans of the neck, chest, abdomen, and pelvis were ordered at that time showed lymphadenopathy in the neck, chest, abdomen, and pelvis with mild hepatosplenomegaly.    Of note, due to an elevated PSA and total protein on 6/24/2020, the patient had protein electrophoresis ordered on his serum which was done on 6/25/2020 and revealed an M spike of 0.7.  Corresponding immunofixation showed an unremarkable pattern with no evidence of monoclonal protein apparent.  Patient was seen by Dr. Pee Knox with urology and underwent a prostate biopsy on 9/14/2020.  Pathology revealed adenocarcinoma, Bertha score 9/10 in 12/12 cores.  Per patient report, he had MRI and bone scan at [facility], bone scan was negative and MRI showed pelvic lymphadenopathy.    Repeat scans on 10/06/2020 showed increase in size of some of the lymph nodes.    Patient started on radiation for prostate cancer on 12/14/2020.      PET/CT scan on 3/4/2021 showed hypermetabolic lymphadenopathy in the neck, chest, and abdomen, some of which have increased in size most significantly in the chest.  There was interval decrease in size of pelvic lymphadenopathy.  He started on abiraterone and prednisone through Dr. Papito Callaway with radiation oncology on 5/7/2021.  Ibrutinib ordered on 5/25/2021 and started shortly after.    PET/CT scan on 8/4/2021 showed disease progression most notable in the chest and abdomen with Deauville score of 5.  Left axillary lymph node biopsy on 8/18/2021 showed transformation SLL to Hodgkin lymphoma    Baseline ECHO on 9/7/2021 showed EF of 60-65%.  Baseline PFT on 9/7/2021 normal.  We started ABVD on 9/8/2021.  She received ABVD for 3 cycles, dropped the bleomycin with cycle 4. Patient completed chemotherapy on 02/10/2022.    End of treatment PET/CT scan on 02/28/2022 revealed  complete response, Deauville score 2-3.  PET/CT scan done at [facility] on 06/08/2022 revealed stable to interval increased size of multiple lymph nodes above and below the diaphragm that remain mildly avid for FDG with resolution of mild diffuse bone marrow uptake.  There was a new focal uptake associated with sclerotic changes near the superior endplate of L3.  PSMA PET/CT on 08/04/2022 showed retrocaval and right common iliac lymph nodes with moderate to intense uptake consistent with metastatic prostate cancer.  There are multiple additional lymph nodes above and below the diaphragm that have not significantly changed.  There was a sclerotic lesion at L3 consistent with metastatic disease and no other suspicious uptake.     PET/CT scan done on 12/2/2022 (compared to PET/CT from 08/04/2021) showed improved size and hypermetabolism of reference lymph nodes with skeletal hypermetabolism.  Then had a repeat PSMA PET/CT scan on 02/17/2023 that showed multiple lymph nodes involved and diffuse osseous involvement of prostate cancer. Left supraclavicular lymph node biopsy on 03/31/2023 showed SLL.    PSMA PET/CT scan done on 6/8/2023 was overall progression of disease in the bones with liver lesions and worsening disease in the lymph nodes.    Interval History:  Patient presents to clinic for scheduled follow up visit for metastatic prostate cancer, accompanied by his brother.  Visit he started Enzalutamide.  Today he does report side effects from treatment mainly that he feels very tired for a few hours after taking the medication, some occasional nausea.  He does have some body aches/pain for which he takes naproxen and gets pretty good relief from that.    He denies any bowel changes.    Past Medical History:   Diagnosis Date    Anemia     Colon polyp 12/05/2014    colonoscopy    Diffuse well-differentiated lymphocytic lymphoma     Dyslipidemia     GERD (gastroesophageal reflux disease)     Hodgkin lymphoma      Hypercalcemia     Hypertension     Neuropathy       Past Surgical History:   Procedure Laterality Date    BONE MARROW ASPIRATION  12/2022    COLONOSCOPY W/ POLYPECTOMY  12/05/2014    ENDOSCOPIC CARPAL TUNNEL RELEASE Left 05/05/2021    ENDOSCOPIC CARPAL TUNNEL RELEASE Right 06/27/2019    EXCISIONAL BIOPSY, LYMPH NODE Left 3/31/2023    Procedure: EXCISIONAL BIOPSY, LYMPH NODE/ LEFT SUPRACLAVICULAR NODE / WITH ULTRASOUND GUIDANCE;  Surgeon: Hugh Neri MD;  Location: [facility];  Service: Oncology;  Laterality: Left;    INGUINAL HERNIA REPAIR Left     KNEE SURGERY Left     MEDIPORT INSERTION, SINGLE  09/08/2021    skin cancer removal on head  02/09/2022    skin cancer removal on right leg  01/26/2022     Social History     Socioeconomic History    Marital status: Single   Tobacco Use    Smoking status: Former     Types: Cigarettes    Smokeless tobacco: Never    Tobacco comments:     stopped at age 16   Substance and Sexual Activity    Alcohol use: Not Currently     Comment: 1-2 times per month    Drug use: Yes     Types: Marijuana    Sexual activity: Not Currently      Family History   Problem Relation Age of Onset    Alzheimer's disease Mother     Arthritis Mother     Hypertension Mother     Cataracts Mother     Heart attack Father     Atrial fibrillation Brother       Review of patient's allergies indicates:  No Known Allergies   Review of Systems   Constitutional:  Positive for activity change and fatigue. Negative for chills, diaphoresis, fever and unexpected weight change.   HENT:  Negative for nasal congestion, mouth sores, sinus pressure/congestion and sore throat.    Eyes:  Negative for pain and visual disturbance.   Respiratory:  Negative for cough, chest tightness and shortness of breath.    Cardiovascular:  Negative for chest pain, palpitations and leg swelling.   Gastrointestinal:  Positive for constipation. Negative for abdominal distention, abdominal pain, blood in stool and diarrhea.   Genitourinary:   Negative for dysuria, frequency and hematuria.   Musculoskeletal:  Negative for arthralgias and back pain.   Integumentary:  Negative for rash.   Neurological:  Positive for numbness. Negative for dizziness, weakness and headaches.   Hematological:  Negative for adenopathy.   Psychiatric/Behavioral:  Negative for confusion.        Objective:      Physical Exam  Vitals reviewed.   Constitutional:       General: He is awake.      Appearance: Normal appearance.   HENT:      Head: Normocephalic and atraumatic.      Right Ear: Hearing normal.      Left Ear: Hearing normal.      Nose: Nose normal.   Eyes:      General: Lids are normal. Vision grossly intact.      Extraocular Movements: Extraocular movements intact.      Conjunctiva/sclera: Conjunctivae normal.   Pulmonary:      Effort: Pulmonary effort is normal.   Musculoskeletal:      Cervical back: Full passive range of motion without pain.      Right lower leg: No edema.      Left lower leg: No edema.   Skin:     General: Skin is warm.   Neurological:      General: No focal deficit present.      Mental Status: He is alert and oriented to person, place, and time.   Psychiatric:         Attention and Perception: Attention normal.         Mood and Affect: Mood and affect normal.         Behavior: Behavior is cooperative.       LABS AND IMAGING REVIEWED IN EPIC          Assessment:     1.  Small lymphocytic lymphoma, Lugano stage III with a p53, 11q., and 13q. deletion.  2.  Bertha 9/10 prostate cancer in 12/12 core biopsies  3.  Symptomatic anemia        Plan:       Patient unfortunately had progression on his PSMA PET/CT scan on 06/08/2023.  We will stop giving cabazitaxel, had to hold carboplatin due to national shortage.    Patient has not seen enzalutamide, I will call in enzalutamide as a bridge to hopeful Pluv[facility]Healtho in the near future.  I will get the patient back with Dr. Callaway and discuss this case.    As for his Hodgkin lymphoma, most recent PET/CT essentially  stable.  Biopsy on 03/31/2023 showed small lymphocytic lymphoma.    Will follow up with radiation oncology, Dr. Callaway     He is no longer having thrombocytopenia.  ANC is normal.    Continue enzalutamide, recommended he take his dose at bedtime and take Zofran 30 minutes before prevent nausea.      Return to clinic in 4 weeks.  He knows to call sooner if he has worsening fatigue or shortness of breath with exertion.    Labs: CBC, CMP, and PSA    HAYDER Balderas-[place]

## 2023-07-14 NOTE — PROGRESS NOTES
Pt requesting refill on oral supplements at [facility]. Faxed updated demographic referral and order for Ensure Original 2 cases 2 months to [facility]. Notified pt via phone call who verbalized understanding.

## 2023-07-24 NOTE — TELEPHONE ENCOUNTER
Patient requesting to have labs drawn. States he feels that he is very anemic; c/o fatigue. Next visit with labs isn't until 8/10/23. Please advise if okay.

## 2023-08-09 NOTE — PROGRESS NOTES
"  Subjective:       Patient ID: Karl Duane Jeter is a 67 y.o. male.    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    Current Treatment:   Enzalutamide ordered on 06/14/2023 as a bridge to Pluvicto    Treatment History:  SLL -- progressive lymphadenopathy on repeat PET 3/4/2021-- Started treatment with Imbruvica 420mg PO daily shortly after 5/25/2021.  Due to transformation of SLL to Hodgkin lymphoma, we started ABVD--- Cycle #1 on 9/8/2021.  Bleomycin dropped with cycle 4, cycle 6-day 15 given on 2/10/2022.  Status post radiation therapy, started 09/2022.  Docetaxel started on 11/10/2022 for prostate cancer.  Received cycle 4 day 1 on 01/12/2023.  Stopped due to progression  Patient also getting androgen deprivation therapy with Urology.  Cabazitaxel 20 mg per m2 and carboplatin AUC of 4 every 3 weeks with growth factor support to started on 03/13/2023.    HPI:  Patient who noticed a lump in his neck in April 2020.  He presented to his PCP who ordered an ultrasound of the neck.  Ultrasound on 4/22/2020 showed prominent lymphadenopathy involving the supraclavicular regions bilaterally and bilateral cervical chains.  This is concerning for lymphoma or metastatic disease.    He was seen by Dr. Hugh Neri on 4/29/2020 and plan was made for excisional biopsy of the lymph node.  Excisional biopsy was done on 4/30/2020 and pathology revealed small lymphocytic lymphoma, CD5 positive, CD20 positive, and CD23 positive.  Cyclin D1 was negative.  FISH prognostic panel on the lymph node biopsy revealed a p53 deletion (17p13), a mono allelic deletion of [id number] (13q14), and a " deletion of SAMMI (11q22.3).  He was referred to medical oncology for further recommendations.    He presented initially to medical oncology on 5/18/2020.  CT scans of the neck, chest, abdomen, and pelvis were ordered at that time showed lymphadenopathy in the neck, chest, abdomen, and pelvis with mild hepatosplenomegaly.    Of note, due to an elevated PSA and total protein on 6/24/2020, the patient had protein electrophoresis ordered on his serum which was done on 6/25/2020 and revealed an M spike of 0.7.  Corresponding immunofixation showed an unremarkable pattern with no evidence of monoclonal protein apparent.  Patient was seen by Dr. Pee Knox with urology and underwent a prostate biopsy on 9/14/2020.  Pathology revealed adenocarcinoma, Bertha score 9/10 in 12/12 cores.  Per patient report, he had MRI and bone scan at [facility], bone scan was negative and MRI showed pelvic lymphadenopathy.    Repeat scans on 10/06/2020 showed increase in size of some of the lymph nodes.    Patient started on radiation for prostate cancer on 12/14/2020.      PET/CT scan on 3/4/2021 showed hypermetabolic lymphadenopathy in the neck, chest, and abdomen, some of which have increased in size most significantly in the chest.  There was interval decrease in size of pelvic lymphadenopathy.  He started on abiraterone and prednisone through Dr. Papito Callaway with radiation oncology on 5/7/2021.  Ibrutinib ordered on 5/25/2021 and started shortly after.    PET/CT scan on 8/4/2021 showed disease progression most notable in the chest and abdomen with Deauville score of 5.  Left axillary lymph node biopsy on 8/18/2021 showed transformation SLL to Hodgkin lymphoma    Baseline ECHO on 9/7/2021 showed EF of 60-65%.  Baseline PFT on 9/7/2021 normal.  We started ABVD on 9/8/2021.  She received ABVD for 3 cycles, dropped the bleomycin with cycle 4. Patient completed chemotherapy on 02/10/2022.    End of treatment PET/CT scan on 02/28/2022 revealed  complete response, Deauville score 2-3.  PET/CT scan done at [facility] on 06/08/2022 revealed stable to interval increased size of multiple lymph nodes above and below the diaphragm that remain mildly avid for FDG with resolution of mild diffuse bone marrow uptake.  There was a new focal uptake associated with sclerotic changes near the superior endplate of L3.  PSMA PET/CT on 08/04/2022 showed retrocaval and right common iliac lymph nodes with moderate to intense uptake consistent with metastatic prostate cancer.  There are multiple additional lymph nodes above and below the diaphragm that have not significantly changed.  There was a sclerotic lesion at L3 consistent with metastatic disease and no other suspicious uptake.     PET/CT scan done on 12/2/2022 (compared to PET/CT from 08/04/2021) showed improved size and hypermetabolism of reference lymph nodes with skeletal hypermetabolism.  Then had a repeat PSMA PET/CT scan on 02/17/2023 that showed multiple lymph nodes involved and diffuse osseous involvement of prostate cancer. Left supraclavicular lymph node biopsy on 03/31/2023 showed SLL.    PSMA PET/CT scan done on 6/8/2023 was overall progression of disease in the bones with liver lesions and worsening disease in the lymph nodes.    Interval History:  Patient presents to clinic for scheduled follow up visit for metastatic prostate cancer, accompanied by his brother.  He is not tolerating enzalutamide very well.  He recently saw Dr. Callaway, Dr. Callaway referred him for predictive treatment.  He has an appointment today in [place].        Past Medical History:   Diagnosis Date    Anemia     Colon polyp 12/05/2014    colonoscopy    Diffuse well-differentiated lymphocytic lymphoma     Dyslipidemia     GERD (gastroesophageal reflux disease)     Hodgkin lymphoma     Hypercalcemia     Hypertension     Neuropathy       Past Surgical History:   Procedure Laterality Date    BONE MARROW ASPIRATION  12/2022     COLONOSCOPY W/ POLYPECTOMY  12/05/2014    ENDOSCOPIC CARPAL TUNNEL RELEASE Left 05/05/2021    ENDOSCOPIC CARPAL TUNNEL RELEASE Right 06/27/2019    EXCISIONAL BIOPSY, LYMPH NODE Left 3/31/2023    Procedure: EXCISIONAL BIOPSY, LYMPH NODE/ LEFT SUPRACLAVICULAR NODE / WITH ULTRASOUND GUIDANCE;  Surgeon: Hugh Neri MD;  Location: [facility];  Service: Oncology;  Laterality: Left;    INGUINAL HERNIA REPAIR Left     KNEE SURGERY Left     MEDIPORT INSERTION, SINGLE  09/08/2021    skin cancer removal on head  02/09/2022    skin cancer removal on right leg  01/26/2022     Social History     Socioeconomic History    Marital status: Single   Tobacco Use    Smoking status: Former     Current packs/day: 0.00     Types: Cigarettes    Smokeless tobacco: Never    Tobacco comments:     stopped at age 16   Substance and Sexual Activity    Alcohol use: Not Currently     Comment: 1-2 times per month    Drug use: Yes     Types: Marijuana    Sexual activity: Not Currently      Family History   Problem Relation Age of Onset    Alzheimer's disease Mother     Arthritis Mother     Hypertension Mother     Cataracts Mother     Heart attack Father     Atrial fibrillation Brother       Review of patient's allergies indicates:  No Known Allergies   Review of Systems   Constitutional:  Positive for activity change and fatigue. Negative for chills, diaphoresis, fever and unexpected weight change.   HENT:  Negative for nasal congestion, mouth sores, sinus pressure/congestion and sore throat.    Eyes:  Negative for pain and visual disturbance.   Respiratory:  Negative for cough, chest tightness and shortness of breath.    Cardiovascular:  Negative for chest pain, palpitations and leg swelling.   Gastrointestinal:  Positive for constipation. Negative for abdominal distention, abdominal pain, blood in stool and diarrhea.   Genitourinary:  Negative for dysuria, frequency and hematuria.   Musculoskeletal:  Negative for arthralgias and back pain.    Integumentary:  Negative for rash.   Neurological:  Positive for numbness. Negative for dizziness, weakness and headaches.   Hematological:  Negative for adenopathy.   Psychiatric/Behavioral:  Negative for confusion.          Objective:      Physical Exam  Vitals reviewed.   Constitutional:       General: He is awake.      Appearance: Normal appearance.   HENT:      Head: Normocephalic and atraumatic.      Right Ear: Hearing normal.      Left Ear: Hearing normal.      Nose: Nose normal.   Eyes:      General: Lids are normal. Vision grossly intact.      Extraocular Movements: Extraocular movements intact.      Conjunctiva/sclera: Conjunctivae normal.   Pulmonary:      Effort: Pulmonary effort is normal.   Musculoskeletal:      Cervical back: Full passive range of motion without pain.      Right lower leg: No edema.      Left lower leg: No edema.   Skin:     General: Skin is warm.   Neurological:      General: No focal deficit present.      Mental Status: He is alert and oriented to person, place, and time.   Psychiatric:         Attention and Perception: Attention normal.         Mood and Affect: Mood and affect normal.         Behavior: Behavior is cooperative.         LABS AND IMAGING REVIEWED IN EPIC          Assessment:     1.  Small lymphocytic lymphoma, Lugano stage III with a p53, 11q., and 13q. deletion.  2.  Loving 9/10 prostate cancer in 12/12 core biopsies  3.  Symptomatic anemia        Plan:       Patient unfortunately had progression on his PSMA PET/CT scan on 06/08/2023.  We will stop giving cabazitaxel, had to hold carboplatin due to national shortage.    Patient has not seen enzalutamide, I will call in [facility]amide as a [facility].  I will get the patient back with Dr. Callaway and discuss this case.    As for his Hodgkin lymphoma, most recent PET/CT essentially stable.  Biopsy on 03/31/2023 showed small lymphocytic lymphoma.    Will follow up with radiation  oncology, Dr. Callaway     Continue enzalutamide, but follow recommendations of physician in [place].    Will set up for MVI on Monday, 8/14/2023    Return to clinic in 6 weeks.  .    Labs: CBC, CMP, and PSA    Pedro Sol II, MD

## 2023-08-22 NOTE — PLAN OF CARE
Problem: Adult Inpatient Plan of Care  Goal: Plan of Care Review  Outcome: Ongoing, Progressing  Flowsheets (Taken 8/22/2023 1731)  Plan of Care Reviewed With: patient  Goal: Patient-Specific Goal (Individualized)  Outcome: Ongoing, Progressing  Flowsheets (Taken 8/22/2023 1731)  Anxieties, Fears or Concerns: control pain  Individualized Care Needs: pain control, IV fluids  Goal: Absence of Hospital-Acquired Illness or Injury  Outcome: Ongoing, Progressing  Intervention: Identify and Manage Fall Risk  Flowsheets (Taken 8/22/2023 1731)  Safety Promotion/Fall Prevention:   assistive device/personal item within reach   bed alarm set   instructed to call staff for mobility   side rails raised x 3   lighting adjusted   nonskid shoes/socks when out of bed   high risk medications identified  Intervention: Prevent Skin Injury  Flowsheets (Taken 8/22/2023 1731)  Body Position: position changed independently  Skin Protection:   protective footwear used   incontinence pads utilized   adhesive use limited  Intervention: Prevent and Manage VTE (Venous Thromboembolism) Risk  Flowsheets (Taken 8/22/2023 1731)  Activity Management: Rolling - L1  VTE Prevention/Management:   bleeding precations maintained   bleeding risk assessed   fluids promoted  Range of Motion: active ROM (range of motion) encouraged  Intervention: Prevent Infection  Flowsheets (Taken 8/22/2023 1731)  Infection Prevention:   cohorting utilized   personal protective equipment utilized   environmental surveillance performed   rest/sleep promoted   single patient room provided   equipment surfaces disinfected   hand hygiene promoted     Problem: Infection  Goal: Absence of Infection Signs and Symptoms  Outcome: Ongoing, Progressing  Intervention: Prevent or Manage Infection  Flowsheets (Taken 8/22/2023 1731)  Fever Reduction/Comfort Measures:   lightweight bedding   lightweight clothing   fluid intake increased     Problem: Fall Injury Risk  Goal: Absence of Fall  and Fall-Related Injury  Outcome: Ongoing, Progressing  Intervention: Identify and Manage Contributors  Flowsheets (Taken 8/22/2023 1731)  Self-Care Promotion:   independence encouraged   meal set-up provided   safe use of adaptive equipment encouraged   BADL personal objects within reach   BADL personal routines maintained  Medication Review/Management:   medications reviewed   high-risk medications identified  Intervention: Promote Injury-Free Environment  Flowsheets (Taken 8/22/2023 1731)  Safety Promotion/Fall Prevention:   assistive device/personal item within reach   bed alarm set   instructed to call staff for mobility   side rails raised x 3   lighting adjusted   nonskid shoes/socks when out of bed   high risk medications identified     Problem: Pain Acute  Goal: Acceptable Pain Control and Functional Ability  Outcome: Ongoing, Progressing  Intervention: Develop Pain Management Plan  Flowsheets (Taken 8/22/2023 1731)  Pain Management Interventions:   pain management plan reviewed with patient/caregiver   medication offered  Intervention: Prevent or Manage Pain  Flowsheets (Taken 8/22/2023 1731)  Bowel Elimination Promotion: adequate fluid intake promoted  Medication Review/Management:   medications reviewed   high-risk medications identified  Intervention: Optimize Psychosocial Wellbeing  Flowsheets (Taken 8/22/2023 1731)  Diversional Activities: television

## 2023-08-22 NOTE — H&P
[facility] MEDICINE - H&P ADMISSION NOTE    Patient Name: Karl Duane Jeter  MRN: [id number]  Patient Class: OP- Observation   Admission Date: 8/22/2023   Admitting Physician: MAXIM Service   Attending Physician: Thairy G Reyes, DO  Primary Care Provider: Tory Batista MD  Face-to-Face encounter date: 08/22/2023      CHIEF COMPLAINT     Chief Complaint   Patient presents with    generalyzed pain today     Has prostate cancer 3 years / bone cancer with lymphoma/ has been able to manage pain until today/ taking hydrocodone but not helping today/ long time ago  time for chemo and radiation he has had       HISTORY OF PRESENTING ILLNESS   68-year-old male with a past medical history of small lymphocytic lymphoma transformation to Hodgkin's lymphoma, prostate cancer with bony Mets, hypertension who presents with complaint of malaise and weakness.  Dr. Simons reports he discussed the case with patient's oncologist Dr. Farooq who reports patient is awaiting Pluvicto and in the meantime being managed with enzalutamide.  In the ED hemoglobin was 7.6 Baseline range is approximately 7.5-8.7.  He was hyponatremic as well.  Patient and his brother who is his caretaker currently have requested evaluation with hospice [facility] and a who they have already started the process with.  PAST MEDICAL HISTORY     Past Medical History:   Diagnosis Date    Anemia     Colon polyp 12/05/2014    colonoscopy    Diffuse well-differentiated lymphocytic lymphoma     Dyslipidemia     GERD (gastroesophageal reflux disease)     Hodgkin lymphoma     Hypercalcemia     Hypertension     Neuropathy        PAST SURGICAL HISTORY     Past Surgical History:   Procedure Laterality Date    BONE MARROW ASPIRATION  12/2022    COLONOSCOPY W/ POLYPECTOMY  12/05/2014    ENDOSCOPIC CARPAL TUNNEL RELEASE Left 05/05/2021    ENDOSCOPIC CARPAL TUNNEL RELEASE Right 06/27/2019    EXCISIONAL BIOPSY, LYMPH NODE Left 3/31/2023    Procedure:  EXCISIONAL BIOPSY, LYMPH NODE/ LEFT SUPRACLAVICULAR NODE / WITH ULTRASOUND GUIDANCE;  Surgeon: Hugh Neri MD;  Location: [facility] OR;  Service: Oncology;  Laterality: Left;    INGUINAL HERNIA REPAIR Left     KNEE SURGERY Left     MEDIPORT INSERTION, SINGLE  09/08/2021    skin cancer removal on head  02/09/2022    skin cancer removal on right leg  01/26/2022       FAMILY HISTORY   Reviewed and noncontributory to this case    SOCIAL HISTORY     Social History     Socioeconomic History    Marital status: Single   Tobacco Use    Smoking status: Former     Current packs/day: 0.00     Types: Cigarettes    Smokeless tobacco: Never    Tobacco comments:     stopped at age 16   Substance and Sexual Activity    Alcohol use: Not Currently     Comment: 1-2 times per month    Drug use: Yes     Types: Marijuana    Sexual activity: Not Currently       HOME MEDICATIONS     Prior to Admission medications    Medication Sig Start Date End Date Taking? Authorizing Provider   amLODIPine (NORVASC) 10 MG tablet TAKE ONE TABLET BY MOUTH ONCE DAILY 6/5/23  Yes Tory Batista MD   calcium carbonate (OS-ESTUARDO) 500 mg calcium (1,250 mg) tablet Take 1 tablet by mouth once daily.   Yes Provider, Historical   enzalutamide (XTANDI) 40 mg Tab Take 160 mg by mouth once daily. 6/21/23  Yes Pedro Sol II, MD   ergocalciferol, vitamin D2, (VITAMIN D ORAL) Take by mouth once daily.   Yes Provider, Historical   HYDROcodone-acetaminophen (NORCO) 7.5-325 mg per tablet Take 1 tablet by mouth every 6 (six) hours as needed for Pain. 8/21/23  Yes Pedro Sol II, MD   naproxen sodium 220 mg Cap Take 2 capsules by mouth as needed. 9/3/21  Yes Provider, Historical   NIACIN ORAL Take by mouth once daily.   Yes Provider, Historical   ondansetron (ZOFRAN) 8 MG tablet Take 1 tablet (8 mg total) by mouth every 12 (twelve) hours as needed for Nausea. 7/13/23 7/12/24 Yes Daniella Wladron FNP   polyethylene glycol 3350 (MIRALAX ORAL) Take 17 g by mouth  once daily. 11/2/21  Yes Provider, Historical   pyridoxine HCl, vitamin B6, (VITAMIN B-6 ORAL) Take by mouth once daily.   Yes Provider, Historical   tamsulosin (FLOMAX) 0.4 mg Cap Take 0.4 mg by mouth once daily. 5/5/21  Yes Provider, Historical   vitamin B complex (B COMPLEX VITAMINS ORAL) Take by mouth once daily.   Yes Provider, Historical   UNABLE TO FIND  3/14/23 8/22/23  Provider, Historical   UNABLE TO FIND  3/14/23 8/22/23  Provider, Historical       ALLERGIES   Patient has no known allergies.  REVIEW OF SYSTEMS   Except as documented above, all other systems reviewed and negative    PHYSICAL EXAM     Vitals:    08/22/23 1545   BP:    Pulse: (!) 113   Resp:    Temp:       General:  Facial pallor, chronically ill-appearing  Head and neck:  Atraumatic, normocephalic, glossitis Chest:  Clear to auscultation bilaterally  Heart:  S1, S2, no appreciable murmur  Abdomen:  Soft, nontender, BS +  MSK:  Warm, no lower extremity edema, no clubbing or cyanosis  Neuro:  Alert and oriented x4, moving all extremities with good strength  Integumentary:  No obvious skin rash  Psychiatry:  Appropriate mood and affect  ASSESSMENT AND PLAN   Small lymphocytic lymphoma   Conversion to Hodgkin's lymphoma   Prostate cancer with bone metastasis  -follows with Dr. Farooq   -continue enzalutamide  -hospice consultation    Normocytic anemia   -repeat H&H in the morning, may need transfusion    Failure to thrive  Hyponatremia   -IV fluids, patient has difficulty swallowing therefore has only been using boost for alimentation    DVT prophylaxis:  SCD in the setting of microcytic anemia   Admit to observation status under my care  __________________________________________________________________  LABS/MICRO/MEDS/DIAGNOSTICS       LABS  Recent Labs     08/22/23  1232   *   K 4.0   CHLORIDE 94*   CO2 26   BUN 12.2   CREATININE 0.67*   GLUCOSE 98   CALCIUM 8.8   ALKPHOS 263*   *   ALT 22   ALBUMIN 2.2*     Recent Labs      08/22/23  1232   WBC 10.73   RBC 2.93*   HCT 25.9*   MCV 88.4          MICROBIOLOGY  Microbiology Results (last 7 days)       ** No results found for the last 168 hours. **            MEDICATIONS     INFUSIONS      DIAGNOSTIC TESTS  No orders to display          Patient information was obtained from patient, patient's family, past medical records and ER records.   All diagnosis and differential diagnosis have been reviewed; assessment and plan has been documented. I have personally reviewed the labs and test results that are presently available; I have reviewed the patients medication list. I have reviewed the consulting providers response and recommendations. I have reviewed or attempted to review medical records based upon their availability.  All of the patient's questions have been addressed and answered. Patient's is agreeable to the above stated plan. I will continue to monitor closely and make adjustments to medical management as needed.  This note was created using [employer] voice recognition software that occasionally misinterpreted phrases or words.  Please contact me if any questions may rise regarding documentation to clarify verbiage.        Thairy G Reyes, DO   Internal Medicine  Department of Hospital Medicine  [facility] - Emergency Dept

## 2023-08-22 NOTE — NURSING
Nurses Note -- 4 Eyes      8/22/2023   4:52 PM      Skin assessed during: Admit      [x] No Altered Skin Integrity Present    []Prevention Measures Documented      [] Yes- Altered Skin Integrity Present or Discovered   [] LDA Added if Not in Epic (Describe Wound)   [] New Altered Skin Integrity was Present on Admit and Documented in LDA   [] Wound Image Taken    Wound Care Consulted? No    Attending Nurse:  Ashley Rosales LPN    Second RN/Staff Member:   Tabatha Ariza RN

## 2023-08-22 NOTE — TELEPHONE ENCOUNTER
OK -     Spoke with patient's brother, Juan Daniel - states that patient is having intractable pain this a.m. even with norco on board. States that patient is barely able to carry a conversation with him and his voice seems very weak. Advised to take patient into ER for evaluation and pain control. He voiced understanding.

## 2023-08-22 NOTE — PLAN OF CARE
MCKINLEY CLARKE RN  Problem: Adult Inpatient Plan of Care  Goal: Plan of Care Review  Outcome: Ongoing, Progressing  Flowsheets (Taken 8/22/2023 1731)  Plan of Care Reviewed With: patient  Goal: Patient-Specific Goal (Individualized)  Outcome: Ongoing, Progressing  Flowsheets (Taken 8/22/2023 1731)  Anxieties, Fears or Concerns: control pain  Individualized Care Needs: pain control, IV fluids  Goal: Absence of Hospital-Acquired Illness or Injury  Outcome: Ongoing, Progressing  Intervention: Identify and Manage Fall Risk  Flowsheets (Taken 8/22/2023 1731)  Safety Promotion/Fall Prevention:   assistive device/personal item within reach   bed alarm set   instructed to call staff for mobility   side rails raised x 3   lighting adjusted   nonskid shoes/socks when out of bed   high risk medications identified  Intervention: Prevent Skin Injury  Flowsheets (Taken 8/22/2023 1731)  Body Position: position changed independently  Skin Protection:   protective footwear used   incontinence pads utilized   adhesive use limited  Intervention: Prevent and Manage VTE (Venous Thromboembolism) Risk  Flowsheets (Taken 8/22/2023 1731)  Activity Management: Rolling - L1  VTE Prevention/Management:   bleeding precations maintained   bleeding risk assessed   fluids promoted  Range of Motion: active ROM (range of motion) encouraged  Intervention: Prevent Infection  Flowsheets (Taken 8/22/2023 1731)  Infection Prevention:   cohorting utilized   personal protective equipment utilized   environmental surveillance performed   rest/sleep promoted   single patient room provided   equipment surfaces disinfected   hand hygiene promoted     Problem: Infection  Goal: Absence of Infection Signs and Symptoms  Outcome: Ongoing, Progressing  Intervention: Prevent or Manage Infection  Flowsheets (Taken 8/22/2023 1731)  Fever Reduction/Comfort Measures:   lightweight bedding   lightweight clothing   fluid intake increased     Problem: Fall Injury Risk  Goal:  Absence of Fall and Fall-Related Injury  Outcome: Ongoing, Progressing  Intervention: Identify and Manage Contributors  Flowsheets (Taken 8/22/2023 1731)  Self-Care Promotion:   independence encouraged   meal set-up provided   safe use of adaptive equipment encouraged   BADL personal objects within reach   BADL personal routines maintained  Medication Review/Management:   medications reviewed   high-risk medications identified  Intervention: Promote Injury-Free Environment  Flowsheets (Taken 8/22/2023 1731)  Safety Promotion/Fall Prevention:   assistive device/personal item within reach   bed alarm set   instructed to call staff for mobility   side rails raised x 3   lighting adjusted   nonskid shoes/socks when out of bed   high risk medications identified     Problem: Pain Acute  Goal: Acceptable Pain Control and Functional Ability  Outcome: Ongoing, Progressing  Intervention: Develop Pain Management Plan  Flowsheets (Taken 8/22/2023 1731)  Pain Management Interventions:   pain management plan reviewed with patient/caregiver   medication offered  Intervention: Prevent or Manage Pain  Flowsheets (Taken 8/22/2023 1731)  Bowel Elimination Promotion: adequate fluid intake promoted  Medication Review/Management:   medications reviewed   high-risk medications identified  Intervention: Optimize Psychosocial Wellbeing  Flowsheets (Taken 8/22/2023 1731)  Diversional Activities: television

## 2023-08-22 NOTE — ED PROVIDER NOTES
Encounter Date: 8/22/2023       History     Chief Complaint   Patient presents with    generalyzed pain today     Has prostate cancer 3 years / bone cancer with lymphoma/ has been able to manage pain until today/ taking hydrocodone but not helping today/ long time ago  time for chemo and radiation he has had     This 68-year-old male with history of prostate cancer with bone metastasis has been in the care of Oncology for the past 3 years and has undergone multiple chemotherapies and radiation therapies.  His brother has come down from [place] to take care of him as he has no children.  He is gotten progressively weaker and has been getting less and less pain relief with hydrocodone.  He is followed by Dr. Madhav Farooq in [place].  There is 1 last chemotherapy option for which he has been approved, but it has not arrived for him to take.  His brother reports that today he is too weak to get out of bed and has been confused.  He reports that he only eats a few bites of food a day but does drink 2-3 ensures.  Also he has generalized pain.       Review of patient's allergies indicates:  No Known Allergies  Past Medical History:   Diagnosis Date    Anemia     Colon polyp 12/05/2014    colonoscopy    Diffuse well-differentiated lymphocytic lymphoma     Dyslipidemia     GERD (gastroesophageal reflux disease)     Hodgkin lymphoma     Hypercalcemia     Hypertension     Neuropathy      Past Surgical History:   Procedure Laterality Date    BONE MARROW ASPIRATION  12/2022    COLONOSCOPY W/ POLYPECTOMY  12/05/2014    ENDOSCOPIC CARPAL TUNNEL RELEASE Left 05/05/2021    ENDOSCOPIC CARPAL TUNNEL RELEASE Right 06/27/2019    EXCISIONAL BIOPSY, LYMPH NODE Left 3/31/2023    Procedure: EXCISIONAL BIOPSY, LYMPH NODE/ LEFT SUPRACLAVICULAR NODE / WITH ULTRASOUND GUIDANCE;  Surgeon: Hugh Neri MD;  Location[facility];  Service: Oncology;  Laterality: Left;    INGUINAL HERNIA REPAIR Left     KNEE SURGERY Left     MEDIPORT INSERTION,  SINGLE  09/08/2021    skin cancer removal on head  02/09/2022    skin cancer removal on right leg  01/26/2022     Family History   Problem Relation Age of Onset    Alzheimer's disease Mother     Arthritis Mother     Hypertension Mother     Cataracts Mother     Heart attack Father     Atrial fibrillation Brother      Social History     Tobacco Use    Smoking status: Former     Current packs/day: 0.00     Types: Cigarettes    Smokeless tobacco: Never    Tobacco comments:     stopped at age 16   Substance Use Topics    Alcohol use: Not Currently     Comment: 1-2 times per month    Drug use: Yes     Types: Marijuana     Review of Systems   Constitutional:  Negative for fever.   HENT:  Negative for sore throat.    Respiratory:  Negative for shortness of breath.    Cardiovascular:  Negative for chest pain.   Gastrointestinal:  Negative for nausea.   Genitourinary:  Negative for dysuria.   Musculoskeletal:  Positive for arthralgias, back pain and myalgias. Negative for joint swelling.   Skin:  Negative for rash.   Neurological:  Positive for weakness.   Hematological:  Does not bruise/bleed easily.       Physical Exam     Initial Vitals [08/22/23 1026]   BP Pulse Resp Temp SpO2   96/66 (!) 123 (!) 24 98.7 °F (37.1 °C) 96 %      MAP       --         Physical Exam    Nursing note and vitals reviewed.  Constitutional: He appears well-developed and well-nourished.   Pale and chronically ill-appearing   HENT:   Head: Normocephalic and atraumatic.   Mouth/Throat: Mucous membranes are normal.   Eyes: EOM are normal. Pupils are equal, round, and reactive to light.   Neck: Neck supple.   Normal range of motion.  Cardiovascular:  Normal rate, regular rhythm, normal heart sounds and intact distal pulses.           Pulmonary/Chest: Breath sounds normal.   Abdominal: Abdomen is soft. Bowel sounds are normal.   Musculoskeletal:         General: Normal range of motion.      Cervical back: Normal range of motion and neck supple.      Neurological: He is alert and oriented to person, place, and time. He has normal strength.   Skin: Skin is warm and dry. Capillary refill takes less than 2 seconds.   Psychiatric: He has a normal mood and affect. His behavior is normal. Judgment and thought content normal.         ED Course   Procedures  Labs Reviewed   COMPREHENSIVE METABOLIC PANEL - Abnormal; Notable for the following components:       Result Value    Sodium Level 132 (*)     Chloride 94 (*)     Creatinine 0.67 (*)     Albumin Level 2.2 (*)     Globulin 3.7 (*)     Albumin/Globulin Ratio 0.6 (*)     Alkaline Phosphatase 263 (*)     Aspartate Aminotransferase 132 (*)     All other components within normal limits   CBC WITH DIFFERENTIAL - Abnormal; Notable for the following components:    RBC 2.93 (*)     Hgb 7.6 (*)     Hct 25.9 (*)     MCH 25.9 (*)     MCHC 29.3 (*)     RDW 19.6 (*)     IG# 0.16 (*)     All other components within normal limits   CBC W/ AUTO DIFFERENTIAL    Narrative:     The following orders were created for panel order CBC auto differential.  Procedure                               Abnormality         Status                     ---------                               -----------         ------                     CBC with Differential[[id number]]        Abnormal            Final result                 Please view results for these tests on the individual orders.          Imaging Results    None          Medications   sodium chloride 0.9% bolus 1,000 mL 1,000 mL (0 mLs Intravenous Stopped 8/22/23 1256)   morphine injection 2 mg (2 mg Intravenous Given 8/22/23 1222)     Medical Decision Making  When asked what he wanted the patient states that he wants hospice.  His brothers having some difficulty accepting this.  The patient is too weak to get up on his own and even with the assistance he seems to be near total care.  His brother's the only caregiver.  I spoke to Dr. Farooq his oncologist who states that hospice is a good  option.  If he gets stronger he can certainly sign out of hospice and opt to take the chemotherapy.  Since it is late in the day I will admit overnight for pain control and hydration and we can arrange hospice [facility] which is the patient's choice tomorrow.     Amount and/or Complexity of Data Reviewed  Labs: ordered. Decision-making details documented in ED Course.    Risk  Prescription drug management.  Decision regarding hospitalization.  Decision not to resuscitate or to de-escalate care because of poor prognosis.                               Clinical Impression:   Final diagnoses:  [C61, C79.51] Prostate cancer metastatic to bone (Primary)  [R53.1] Weakness  [E86.0] Dehydration  [E87.1] Hyponatremia        ED Disposition Condition    Observation Stable                Chao Aburto MD  08/22/23 0470

## 2023-08-23 NOTE — CONSULTS
Inpatient Nutrition Evaluation    Admit Date: 8/22/2023   Total duration of encounter: 1 day    Nutrition Recommendation/Prescription     Continue full liquids with boost at every meal.     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, Prostate cancer with bone metastasis, normocytic anemia, thrombocytopenia, failure to thrive, severe protein calorie malnutrition, hyponatremia    Relevant Medical History: small lymphocytic lymphoma transformation to Hodgkin's lymphoma, prostate cancer with bony Mets, hypertension     Nutrition-Related Medications: calcium carbonate, ferric gluconate, magnesium sulfate, niacin, vitamin D    Nutrition-Related Labs:   Latest Reference Range & Units 08/23/23 03:30   Sodium 136 - 145 mmol/L 133 (L)   Potassium 3.5 - 5.1 mmol/L 3.8   Chloride 98 - 107 mmol/L 99   CO2 23 - 31 mmol/L 23   Anion Gap mEq/L 11.0   BUN 8.4 - 25.7 mg/dL 10.6   Creatinine 0.73 - 1.18 mg/dL 0.59 (L)   BUN/CREAT RATIO  18   eGFR mls/min/1.73/m2 >60   Glucose 82 - 115 mg/dL 93   Calcium 8.8 - 10.0 mg/dL 7.8 (L)   Magnesium 1.60 - 2.60 mg/dL 1.60   (L): Data is abnormally low    Diet Order: Diet full liquid  Oral Supplement Order: Boost Plus  Appetite/Oral Intake: poor/  Factors Affecting Nutritional Intake: decrease intake,Prostate cancer metastatic to bone   Food/Quaker/Cultural Preferences:  boost chocolate   Food Allergies: none reported       Wound(s):       Comments    Pt intake decrease over the last couple months. Pt was dx with prostate cancer metastatic to bone. Pt states has been drinking boost or ensure. Pt on full liquid diet. Pt reports wt loss 20# in 6 months. Encourage to continue boost supplement.  "LBM pt is unsure. Nursing in room as well. Pt also like apple juice. Pt is DNR.    Anthropometrics    Height: 5' 11" (180.3 cm) Height Method: Stated  Last Weight: 80.4 kg (177 lb 4 oz) (08/22/23 1627) Weight Method: Bed Scale  BMI (Calculated): 24.7  BMI Classification: normal (BMI 18.5-24.9)        Ideal Body Weight (IBW), Male: 172 lb     % Ideal Body Weight, Male (lb): 103.05 %                          Usual Weight Provided By: wt loss 20# in 6 months.     Wt Readings from Last 5 Encounters:   08/22/23 80.4 kg (177 lb 4 oz)   08/10/23 73.3 kg (161 lb 9.6 oz)   07/13/23 79.5 kg (175 lb 6 oz)   06/14/23 81.3 kg (179 lb 3.2 oz)   05/25/23 83.5 kg (184 lb)     Weight Change(s) Since Admission:  Admit Weight: 72.6 kg (160 lb) (08/22/23 1026)      Patient Education        Monitoring & Evaluation     Dietitian will monitor food and beverage intake, weight, weight change, electrolyte/renal panel, glucose/endocrine profile, and gastrointestinal profile.  Nutrition Risk/Follow-Up: low (follow-up in 5-7 days)  Patients assigned 'low nutrition risk' status do not qualify for a full nutritional assessment but will be monitored and re-evaluated in a 5-7 day time period. Please consult if re-evaluation needed sooner.   "

## 2023-08-23 NOTE — PLAN OF CARE
Problem: Adult Inpatient Plan of Care  Goal: Plan of Care Review  Outcome: Ongoing, Progressing  Goal: Patient-Specific Goal (Individualized)  Outcome: Ongoing, Progressing  Flowsheets (Taken 8/22/2023 2000)  Anxieties, Fears or Concerns: control pain  Individualized Care Needs: pain control, IV fluids, lab work  Goal: Absence of Hospital-Acquired Illness or Injury  Outcome: Ongoing, Progressing  Intervention: Prevent and Manage VTE (Venous Thromboembolism) Risk  Flowsheets (Taken 8/22/2023 2000)  VTE Prevention/Management:   bleeding risk assessed   fluids promoted  Intervention: Prevent Infection  Flowsheets (Taken 8/22/2023 2000)  Infection Prevention: rest/sleep promoted  Goal: Optimal Comfort and Wellbeing  Outcome: Ongoing, Progressing  Intervention: Monitor Pain and Promote Comfort  Flowsheets (Taken 8/22/2023 2000)  Pain Management Interventions:   care clustered   quiet environment facilitated   relaxation techniques promoted   pain management plan reviewed with patient/caregiver  Goal: Readiness for Transition of Care  Outcome: Ongoing, Progressing  Intervention: Mutually Develop Transition Plan  Flowsheets (Taken 8/23/2023 0049)  Equipment Currently Used at Home: none  Communicated KEENAN with patient/caregiver: Date not available/Unable to determine  Do you expect to return to your current living situation?: Yes  Do you have help at home or someone to help you manage your care at home?: Yes  Readmission within 30 days?: No  Do you currently have service(s) that help you manage your care at home?: No     Problem: Infection  Goal: Absence of Infection Signs and Symptoms  Outcome: Ongoing, Progressing  Intervention: Prevent or Manage Infection  Flowsheets (Taken 8/22/2023 2000)  Fever Reduction/Comfort Measures:   lightweight clothing   lightweight bedding  Isolation Precautions:   precautions initiated   protective     Problem: Fall Injury Risk  Goal: Absence of Fall and Fall-Related Injury  Outcome:  Ongoing, Progressing     Problem: Pain Acute  Goal: Acceptable Pain Control and Functional Ability  Outcome: Ongoing, Progressing  Intervention: Develop Pain Management Plan  Flowsheets (Taken 8/22/2023 2000)  Pain Management Interventions:   care clustered   quiet environment facilitated   relaxation techniques promoted   pain management plan reviewed with patient/caregiver  Intervention: Prevent or Manage Pain  Flowsheets (Taken 8/22/2023 2000)  Sleep/Rest Enhancement:   noise level reduced   regular sleep/rest pattern promoted  Sensory Stimulation Regulation:   quiet environment promoted   care clustered  Bowel Elimination Promotion: adequate fluid intake promoted

## 2023-08-23 NOTE — PROGRESS NOTES
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  Dr. Aburto reports he discussed the case with patient's oncologist Dr. Farooq who reports patient is awaiting Pluvicto and in the meantime being managed with enzalutamide.  In the ED hemoglobin was 7.6 Baseline range is approximately 7.5-8.7.  He was hyponatremic as well.  Patient and his brother who is his caretaker currently have requested evaluation with [facility] who they have already started the process with.    Today  Patient reports he feels slightly better but remains weak, hemoglobin dropped and requires blood transfusion today.  OBJECTIVE/PHYSICAL EXAM     VITAL SIGNS (MOST RECENT):  Temp: 98.5 °F (36.9 °C) (08/23/23 0950)  Pulse: 102 (08/23/23 0950)  Resp: 19 (08/23/23 0950)  BP: 105/65 (08/23/23 0950)  SpO2: (!) 93 % (08/23/23 0950) VITAL SIGNS (24 HOUR RANGE):  Temp:  [97.6 °F (36.4 °C)-99.8 °F (37.7 °C)] 98.5 °F (36.9 °C)  Pulse:  [] 102  Resp:  [18-24] 19  SpO2:  [80 %-97 %] 93 %  BP: ()/(63-74) 105/65   GENERAL:  Chronically ill-appearing  HEENT:  Conjunctival pallor  CHEST: Clear to auscultation bilaterally  HEART: S1, S2, no appreciable murmur  ABDOMEN: Soft, nontender, BS +  MSK: Warm, no lower extremity edema, no clubbing or cyanosis  NEUROLOGIC: Alert and oriented x4, moving all extremities with good strength   INTEGUMENTARY:  Warm, dry  PSYCHIATRY:  Appropriate affect  ASSESSMENT/PLAN   Small lymphocytic lymphoma   Conversion to Hodgkin's lymphoma   Prostate cancer with bone metastasis  -follows with Dr. Farooq   -continue enzalutamide (brought from home)  -hospice consultation     Normocytic anemia  "  Thrombocytopenia  -transfuse 1 unit pRBCs today  -secondary to chemotherapy, no sign of active bleeding     Failure to thrive  Severe protein calorie malnutrition  Hyponatremia   -IV fluids, patient has difficulty swallowing therefore has only been using boost for alimentation     DVT prophylaxis:  SCD in the setting of microcytic anemia   Anticipated discharge and disposition:  Home with hospice ?  __________________________________________________________________________    LABS/MICRO/MEDS/DIAGNOSTICS       LABS  Recent Labs     08/22/23  1232 08/23/23  0330   * 133*   K 4.0 3.8   CHLORIDE 94* 99   CO2 26 23   BUN 12.2 10.6   CREATININE 0.67* 0.59*   GLUCOSE 98 93   CALCIUM 8.8 7.8*   ALKPHOS 263*  --    *  --    ALT 22  --    ALBUMIN 2.2*  --      Recent Labs     08/23/23  0330   WBC 10.36   RBC 2.39*   HCT 21.3*   MCV 89.1   *       MICROBIOLOGY  Microbiology Results (last 7 days)       ** No results found for the last 168 hours. **               MEDICATIONS   calcium carbonate  500 mg Oral Daily    enzalutamide  160 mg Oral Daily    ferric gluconate (FERRLECIT) 125 mg in sodium chloride 0.9% 100 mL IVPB  125 mg Intravenous Daily    magnesium sulfate IVPB  2 g Intravenous Once    niacin  100 mg Oral Daily    polyethylene glycol  17 g Oral Daily    tamsulosin  0.4 mg Oral Daily    vitamin D  1,000 Units Oral Daily         INFUSIONS         DIAGNOSTIC TESTS  No orders to display        No results found for: "EF"       NUTRITION STATUS  Patient meets ASPEN criteria for   malnutrition of   per RD assessment as evidenced by:                       A minimum of two characteristics is recommended for diagnosis of either severe or non-severe malnutrition.       Case related differential diagnoses have been reviewed; assessment and plan has been documented. I have personally reviewed the labs and test results that are currently available; I have reviewed the patients medication list. I have reviewed " the consulting providers recommendations. I have reviewed or attempted to review medical records based upon their availability.  All of the patient's and/or family's questions have been addressed and answered to the best of my ability.  I will continue to monitor closely and make adjustments to medical management as needed.  This document was created using M*Modal Fluency Direct.  Transcription errors may have been made.  Please contact me if any questions may rise regarding documentation to clarify transcription.        Thairy G Reyes, DO   Internal Medicine  Department of Hospital Medicine [facility]

## 2023-08-23 NOTE — PLAN OF CARE
[facility]  Initial Discharge Assessment       Primary Care Provider: Tory Batista MD    Admission Diagnosis: Dehydration [E86.0]  Hyponatremia [E87.1]  Weakness [R53.1]  Prostate cancer metastatic to bone [C61, C79.51]    Admission Date: 8/22/2023  Expected Discharge Date:     Transition of Care Barriers: None    Payor: [facility] MEDICARE / Plan: [facility] [employer] [place] / Product Type: Medicare Advantage /     Extended Emergency Contact Information  Primary Emergency Contact: JOSE FARRIS  Mobile Phone: [phone]  Relation: Brother  Preferred language: English   needed? No    Discharge Plan A: Hospice/home         [facility] - [address] [address]  Phone: [phone] Fax: [phone]    [facility] - [place], [place] - [address] [place] [address]  [address] [place] [address]  Phone: [phone] Fax: [phone]      Initial Assessment (most recent)       Adult Discharge Assessment - 08/23/23 1612          Discharge Assessment    Assessment Type Discharge Planning Assessment     Confirmed/corrected address, phone number and insurance Yes     Confirmed Demographics Correct on Facesheet     Source of Information patient     If unable to respond/provide information was family/caregiver contacted? Yes     Contact Name/Number Jose Rodney [phone]     Does patient/caregiver understand observation status Yes     Communicated KEENAN with patient/caregiver Date not available/Unable to determine     Reason For Admission [place]     People in Home sibling(s)     Facility Arrived From: home     Do you expect to return to your current living situation? Yes     Do you have help at home or someone to help you manage your care at home? Yes     Who are your caregiver(s) and their phone number(s)? Jose Rodney [phone]     Prior to hospitilization cognitive status:  Alert/Oriented     Current cognitive status: Alert/Oriented     Equipment Currently Used at Home none     Readmission within 30 days? No     Patient currently being followed by outpatient case management? No     Do you currently have service(s) that help you manage your care at home? No     Do you take prescription medications? Yes     Do you have prescription coverage? Yes     Coverage BCBS     Do you have any problems affording any of your prescribed medications? TBD     Is the patient taking medications as prescribed? yes     Who is going to help you get home at discharge? Juan Daniel Rodney [phone]     How do you get to doctors appointments? family or friend will provide     Are you on dialysis? No     Do you take coumadin? No     DME Needed Upon Discharge  none     Discharge Plan discussed with: Sibling     Name(s) and Number(s) Juan Daniel Rodney [phone]     Transition of Care Barriers None     Discharge Plan A Hospice/home        Physical Activity    On average, how many days per week do you engage in moderate to strenuous exercise (like a brisk walk)? 0 days     On average, how many minutes do you engage in exercise at this level? 0 min        Financial Resource Strain    How hard is it for you to pay for the very basics like food, housing, medical care, and heating? Not hard at all        Housing Stability    In the last 12 months, was there a time when you were not able to pay the mortgage or rent on time? No     In the last 12 months, how many places have you lived? 1     In the last 12 months, was there a time when you did not have a steady place to sleep or slept in a shelter (including now)? No        Transportation Needs    In the past 12 months, has lack of transportation kept you from medical appointments or from getting medications? No     In the past 12 months, has lack of transportation kept you from meetings, work, or from getting things needed for daily living? No        Food Insecurity    Within  the past 12 months, you worried that your food would run out before you got the money to buy more. Never true     Within the past 12 months, the food you bought just didn't last and you didn't have money to get more. Never true        Stress    Do you feel stress - tense, restless, nervous, or anxious, or unable to sleep at night because your mind is troubled all the time - these days? Only a little        Social Connections    In a typical week, how many times do you talk on the phone with family, friends, or neighbors? More than three times a week     How often do you get together with friends or relatives? More than three times a week     How often do you attend Anabaptist or Jain services? More than 4 times per year     Do you belong to any clubs or organizations such as Anabaptist groups, unions, fraternal or athletic groups, or school groups? No     How often do you attend meetings of the clubs or organizations you belong to? Never     Are you , , , , never , or living with a partner? Never         Alcohol Use    Q1: How often do you have a drink containing alcohol? Never     Q2: How many drinks containing alcohol do you have on a typical day when you are drinking? Patient does not drink     Q3: How often do you have six or more drinks on one occasion? Never

## 2023-08-23 NOTE — PT/OT/SLP PROGRESS
Orders for bedside swallow evaluation acknowledged. Patient is on a full liquid diet. SPEECH-LANGUAGE PATHOLOGIST had a discussion w/ Patient related to swallowing and any difficulties noted. Patient denied swallow difficulties and reports drinking liquids and taking medications without difficulty. Patient reports no appetite and no desire to eat the last few weeks. Patient politely refused bedside swallow evaluation. SPEECH-LANGUAGE PATHOLOGIST encouraged Patient to reach out w/ any questions or concerns or if status changes.

## 2023-08-23 NOTE — PLAN OF CARE
Problem: Adult Inpatient Plan of Care  Goal: Plan of Care Review  Outcome: Ongoing, Progressing  Flowsheets (Taken 8/23/2023 1010)  Plan of Care Reviewed With:   patient   sibling  Goal: Patient-Specific Goal (Individualized)  Outcome: Ongoing, Progressing  Flowsheets (Taken 8/23/2023 1010)  Anxieties, Fears or Concerns: Control pain  Individualized Care Needs: Pain control, IV fluids, RBC, lab work  Goal: Absence of Hospital-Acquired Illness or Injury  Outcome: Ongoing, Progressing  Intervention: Identify and Manage Fall Risk  Flowsheets (Taken 8/23/2023 1010)  Safety Promotion/Fall Prevention:   assistive device/personal item within reach   bed alarm set   nonskid shoes/socks when out of bed   instructed to call staff for mobility  Intervention: Prevent Skin Injury  Flowsheets (Taken 8/23/2023 1010)  Body Position: position changed independently  Skin Protection:   incontinence pads utilized   protective footwear used   adhesive use limited   transparent dressing maintained  Intervention: Prevent and Manage VTE (Venous Thromboembolism) Risk  Flowsheets (Taken 8/23/2023 1010)  Activity Management:   Rolling - L1   Walk with assistive devise and /or staff member - L3  VTE Prevention/Management:   bleeding risk assessed   bleeding precations maintained   fluids promoted  Range of Motion: active ROM (range of motion) encouraged  Intervention: Prevent Infection  Flowsheets (Taken 8/23/2023 1010)  Infection Prevention:   cohorting utilized   environmental surveillance performed   equipment surfaces disinfected   hand hygiene promoted   personal protective equipment utilized   rest/sleep promoted   single patient room provided  Goal: Optimal Comfort and Wellbeing  Outcome: Ongoing, Progressing  Intervention: Monitor Pain and Promote Comfort  Flowsheets (Taken 8/23/2023 1010)  Pain Management Interventions:   care clustered   medication offered   pain management plan reviewed with patient/caregiver   pillow support  provided   position adjusted   relaxation techniques promoted   quiet environment facilitated  Intervention: Provide Person-Centered Care  Flowsheets (Taken 8/23/2023 1010)  Trust Relationship/Rapport:   care explained   choices provided   emotional support provided   empathic listening provided   questions answered   questions encouraged   reassurance provided   thoughts/feelings acknowledged  Goal: Readiness for Transition of Care  Outcome: Ongoing, Progressing     Problem: Infection  Goal: Absence of Infection Signs and Symptoms  Outcome: Ongoing, Progressing  Intervention: Prevent or Manage Infection  Flowsheets (Taken 8/23/2023 1010)  Fever Reduction/Comfort Measures:   lightweight bedding   lightweight clothing  Infection Management: aseptic technique maintained  Isolation Precautions:   precautions maintained   protective     Problem: Fall Injury Risk  Goal: Absence of Fall and Fall-Related Injury  Outcome: Ongoing, Progressing  Intervention: Identify and Manage Contributors  Flowsheets (Taken 8/23/2023 1010)  Self-Care Promotion:   independence encouraged   BADL personal objects within reach  Medication Review/Management:   medications reviewed   infusion initiated  Intervention: Promote Injury-Free Environment  Flowsheets (Taken 8/23/2023 1010)  Safety Promotion/Fall Prevention:   assistive device/personal item within reach   bed alarm set   nonskid shoes/socks when out of bed   instructed to call staff for mobility     Problem: Pain Acute  Goal: Acceptable Pain Control and Functional Ability  Outcome: Ongoing, Progressing  Intervention: Develop Pain Management Plan  Flowsheets (Taken 8/23/2023 1010)  Pain Management Interventions:   care clustered   medication offered   pain management plan reviewed with patient/caregiver   pillow support provided   position adjusted   relaxation techniques promoted   quiet environment facilitated  Intervention: Prevent or Manage Pain  Flowsheets (Taken 8/23/2023  1010)  Sleep/Rest Enhancement:   natural light exposure provided   noise level reduced   therapeutic touch utilized  Sensory Stimulation Regulation:   quiet environment promoted   care clustered   television on  Bowel Elimination Promotion:   adequate fluid intake promoted   ambulation promoted   diet adjusted  Medication Review/Management:   medications reviewed   infusion initiated  Intervention: Optimize Psychosocial Wellbeing  Flowsheets (Taken 8/23/2023 1010)  Spiritual Activities Assistance: affirmation provided  Supportive Measures:   active listening utilized   positive reinforcement provided   relaxation techniques promoted   self-care encouraged   self-reflection promoted   verbalization of feelings encouraged  Diversional Activities: television

## 2023-08-24 NOTE — NURSING
Patient discharged to [facility] via [facility]. IV left in as requested by the nurse at the [facility]. All of the  patient's questions were answered.

## 2023-08-24 NOTE — NURSING
Nurses Note -- 4 Eyes      8/24/2023   6:14 AM      Skin assessed during: Q Shift Change      [x] No Altered Skin Integrity Present    []Prevention Measures Documented      [] Yes- Altered Skin Integrity Present or Discovered   [] LDA Added if Not in Epic (Describe Wound)   [] New Altered Skin Integrity was Present on Admit and Documented in LDA   [] Wound Image Taken    Wound Care Consulted? No    Attending Nurse:  Ameena GALVAN RN    Second RN/Staff Member:   SHERICE Downey

## 2023-08-24 NOTE — DISCHARGE SUMMARY
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  Dr. Aburto reports he discussed the case with patient's oncologist Dr. Farooq who reports patient is awaiting Pluvicto and in the meantime being managed with enzalutamide.  In the ED hemoglobin was 7.6 Baseline range is approximately 7.5-8.7.  He was hyponatremic as well.  Patient and his brother who is his caretaker currently have requested evaluation with [facility] who they have already started the process with.  Patient's hemoglobin dropped while admitted, he required 2 units of PRBCs and hemoglobin has improved to 8.3 this morning.  Patient's hypotension and tachycardia have also improved after transfusion.  Patient will be discharged today with hospice.  PHYSICAL EXAM     Most Recent Vital Signs:  Temp: 97.9 °F (36.6 °C) (08/24/23 1148)  Pulse: 98 (08/24/23 1148)  Resp: 16 (08/24/23 1138)  BP: 102/63 (08/24/23 1148)  SpO2: 95 % (08/24/23 1148)   GENERAL:  Chronically ill-appearing  HEENT:  PERRLA  CHEST: Clear to auscultation bilaterally  HEART: S1, S2, no appreciable murmur  ABDOMEN: Soft, nontender, BS +  MSK: Warm, no lower extremity edema, no clubbing or cyanosis  NEUROLOGIC: Alert and oriented x4, moving all extremities with good strength   INTEGUMENTARY:  Warm, dry  PSYCHIATRY:  Appropriate affect  DISCHARGE DIAGNOSIS   Small lymphocytic lymphoma   Conversion to Hodgkin's lymphoma   Prostate cancer with bone metastasis  -follows with Dr. Farooq   -continue  enzalutamide (brought from home)  -discharge to hospice today      Normocytic anemia   Thrombocytopenia  -s/p 2 units of pRBCs 8/23  -secondary to chemotherapy, no sign of active bleeding     Failure to thrive  Severe protein calorie malnutrition  Hyponatremia   -evaluated by ST  _____________________________________________________________________________      DISCHARGE MED REC     Current Discharge Medication List        START taking these medications    Details   magnesium oxide (MAG-OX) 400 mg (241.3 mg magnesium) tablet Take 1 tablet (400 mg total) by mouth once daily.  Qty: 30 tablet, Refills: 0           CONTINUE these medications which have NOT CHANGED    Details   calcium carbonate (OS-ESTUARDO) 500 mg calcium (1,250 mg) tablet Take 1 tablet by mouth once daily.      enzalutamide (XTANDI) 40 mg Tab Take 160 mg by mouth once daily.  Qty: 120 tablet, Refills: 11    Associated Diagnoses: Malignant neoplasm of prostate      ergocalciferol, vitamin D2, (VITAMIN D ORAL) Take by mouth once daily.      HYDROcodone-acetaminophen (NORCO) 7.5-325 mg per tablet Take 1 tablet by mouth every 6 (six) hours as needed for Pain.  Qty: 120 tablet, Refills: 0    Comments: Quantity prescribed more than 7 day supply? Yes, quantity medically necessary  Associated Diagnoses: Prostate cancer metastatic to bone; Nodular sclerosing Hodgkin's lymphoma, unspecified body region      NIACIN ORAL Take by mouth once daily.      ondansetron (ZOFRAN) 8 MG tablet Take 1 tablet (8 mg total) by mouth every 12 (twelve) hours as needed for Nausea.  Qty: 30 tablet, Refills: 2    Associated Diagnoses: Decreased appetite      polyethylene glycol 3350 (MIRALAX ORAL) Take 17 g by mouth once daily.      pyridoxine HCl, vitamin B6, (VITAMIN B-6 ORAL) Take by mouth once daily.      tamsulosin (FLOMAX) 0.4 mg Cap Take 0.4 mg by mouth once daily.      vitamin B complex (B COMPLEX VITAMINS ORAL) Take by mouth once daily.           STOP taking these medications        amLODIPine (NORVASC) 10 MG tablet Comments:   Reason for Stopping:         naproxen sodium 220 mg Cap Comments:   Reason for Stopping:         UNABLE TO FIND Comments:   Reason for Stopping:         UNABLE TO FIND Comments:   Reason for Stopping:                  CONSULTS     Consults (From admission, onward)          Status Ordering Provider     Inpatient consult to Registered Dietitian/Nutritionist  Once        Provider:  (Not yet assigned)    Completed REYES, THAIRY G              FOLLOW UP      Follow-up Information       Pedro Sol II, MD .    Specialties: Oncology, Hematology and Oncology  Contact information:  [address] [facility]  [address]  [place] [address]  [phone]               [facility] Follow up.    Contact information:  [address] [facility], [address]  [place] [address]  [phone]                               DISCHARGE INSTRUCTIONS     Explained in detail to the patient about the discharge plan, medications, and follow-up visits. Pt understands and agrees with the treatment plan.  Discharged Condition: stable  Diet as tolerated  Activities as tolerated  Discharge to: Hospice/Medical Facility    TIME SPENT ON DISCHARGE   35 minutes        Thairy G Reyes, MD  Internal Medicine  Department of Hospital Medicine  [facility]      This document was created using electronic dictation services.  Please excuse any errors that may have been made.  Contact me if any questions regarding documentation to clarify verbiage.

## 2023-08-24 NOTE — PLAN OF CARE
Problem: Adult Inpatient Plan of Care  Goal: Plan of Care Review  Outcome: Ongoing, Progressing  Flowsheets (Taken 8/24/2023 0753)  Plan of Care Reviewed With: patient  Goal: Patient-Specific Goal (Individualized)  Outcome: Ongoing, Progressing  Flowsheets (Taken 8/24/2023 0753)  Anxieties, Fears or Concerns: Pain control  Individualized Care Needs: Pain control, Blood administration, lab work  Goal: Absence of Hospital-Acquired Illness or Injury  Outcome: Ongoing, Progressing  Intervention: Identify and Manage Fall Risk  Flowsheets (Taken 8/24/2023 0753)  Safety Promotion/Fall Prevention:   assistive device/personal item within reach   nonskid shoes/socks when out of bed   side rails raised x 3   medications reviewed   instructed to call staff for mobility  Intervention: Prevent Skin Injury  Flowsheets (Taken 8/24/2023 0753)  Body Position: position changed independently  Skin Protection:   adhesive use limited   incontinence pads utilized   transparent dressing maintained  Intervention: Prevent and Manage VTE (Venous Thromboembolism) Risk  Flowsheets (Taken 8/24/2023 0753)  Activity Management: Rolling - L1  VTE Prevention/Management:   bleeding precations maintained   fluids promoted  Range of Motion: active ROM (range of motion) encouraged  Intervention: Prevent Infection  Flowsheets (Taken 8/24/2023 0753)  Infection Prevention:   cohorting utilized   environmental surveillance performed   equipment surfaces disinfected   hand hygiene promoted   personal protective equipment utilized   rest/sleep promoted   single patient room provided  Goal: Optimal Comfort and Wellbeing  Outcome: Ongoing, Progressing  Intervention: Monitor Pain and Promote Comfort  Flowsheets (Taken 8/24/2023 0753)  Pain Management Interventions:   care clustered   medication offered   position adjusted   pillow support provided   quiet environment facilitated   pain management plan reviewed with patient/caregiver  Intervention: Provide  Person-Centered Care  Flowsheets (Taken 8/24/2023 0753)  Trust Relationship/Rapport:   care explained   choices provided   emotional support provided   empathic listening provided   questions answered   questions encouraged   reassurance provided   thoughts/feelings acknowledged  Goal: Readiness for Transition of Care  Outcome: Ongoing, Progressing  Intervention: Mutually Develop Transition Plan  Flowsheets (Taken 8/24/2023 0753)  Equipment Currently Used at Home: none  Transportation Anticipated: family or friend will provide  Who are your caregiver(s) and their phone number(s)?: Juan Daniel Rodney [phone]  Communicated KEENAN with patient/caregiver: Date not available/Unable to determine  Do you expect to return to your current living situation?: Yes  Do you have help at home or someone to help you manage your care at home?: Yes  Readmission within 30 days?: No  Do you currently have service(s) that help you manage your care at home?: No     Problem: Infection  Goal: Absence of Infection Signs and Symptoms  Outcome: Ongoing, Progressing  Intervention: Prevent or Manage Infection  Flowsheets (Taken 8/24/2023 0753)  Fever Reduction/Comfort Measures:   lightweight bedding   lightweight clothing  Infection Management: aseptic technique maintained  Isolation Precautions:   precautions initiated   protective     Problem: Fall Injury Risk  Goal: Absence of Fall and Fall-Related Injury  Outcome: Ongoing, Progressing  Intervention: Identify and Manage Contributors  Flowsheets (Taken 8/24/2023 0753)  Self-Care Promotion:   independence encouraged   BADL personal objects within reach  Medication Review/Management: medications reviewed  Intervention: Promote Injury-Free Environment  Flowsheets (Taken 8/24/2023 0753)  Safety Promotion/Fall Prevention:   assistive device/personal item within reach   nonskid shoes/socks when out of bed   side rails raised x 3   medications reviewed   instructed to call staff for mobility      Problem: Pain Acute  Goal: Acceptable Pain Control and Functional Ability  Outcome: Ongoing, Progressing  Intervention: Develop Pain Management Plan  Flowsheets (Taken 8/24/2023 0753)  Pain Management Interventions:   care clustered   medication offered   position adjusted   pillow support provided   quiet environment facilitated   pain management plan reviewed with patient/caregiver  Intervention: Prevent or Manage Pain  Flowsheets (Taken 8/24/2023 0753)  Sleep/Rest Enhancement:   awakenings minimized   regular sleep/rest pattern promoted   noise level reduced   therapeutic touch utilized   room darkened  Sensory Stimulation Regulation:   quiet environment promoted   care clustered   television on  Bowel Elimination Promotion:   adequate fluid intake promoted   diet adjusted  Complementary Therapy: other (see comments)  Medication Review/Management: medications reviewed      Frequent falls

## 2023-08-24 NOTE — PLAN OF CARE
Problem: Adult Inpatient Plan of Care  Goal: Plan of Care Review  Outcome: Ongoing, Progressing  Goal: Patient-Specific Goal (Individualized)  Outcome: Ongoing, Progressing  Flowsheets (Taken 8/23/2023 2000)  Anxieties, Fears or Concerns: pain control  Individualized Care Needs: pain control, Blood administration, lab work  Goal: Absence of Hospital-Acquired Illness or Injury  Outcome: Ongoing, Progressing  Intervention: Prevent and Manage VTE (Venous Thromboembolism) Risk  Flowsheets (Taken 8/23/2023 2000)  VTE Prevention/Management:   bleeding precations maintained   fluids promoted  Goal: Optimal Comfort and Wellbeing  Outcome: Ongoing, Progressing  Intervention: Monitor Pain and Promote Comfort  Flowsheets (Taken 8/23/2023 2000)  Pain Management Interventions:   care clustered   quiet environment facilitated   relaxation techniques promoted   pain management plan reviewed with patient/caregiver   pillow support provided  Goal: Readiness for Transition of Care  Outcome: Ongoing, Progressing  Intervention: Mutually Develop Transition Plan  Flowsheets (Taken 8/24/2023 0551)  Equipment Currently Used at Home: none  Communicated KEENAN with patient/caregiver: Date not available/Unable to determine  Do you expect to return to your current living situation?: Yes  Do you have help at home or someone to help you manage your care at home?: Yes  Readmission within 30 days?: No  Do you currently have service(s) that help you manage your care at home?: No     Problem: Infection  Goal: Absence of Infection Signs and Symptoms  Outcome: Ongoing, Progressing  Intervention: Prevent or Manage Infection  Flowsheets (Taken 8/23/2023 2000)  Fever Reduction/Comfort Measures:   lightweight clothing   lightweight bedding     Problem: Fall Injury Risk  Goal: Absence of Fall and Fall-Related Injury  Outcome: Ongoing, Progressing     Problem: Pain Acute  Goal: Acceptable Pain Control and Functional Ability  Outcome: Ongoing,  Progressing  Intervention: Develop Pain Management Plan  Flowsheets (Taken 8/23/2023 2000)  Pain Management Interventions:   care clustered   quiet environment facilitated   relaxation techniques promoted   pain management plan reviewed with patient/caregiver   pillow support provided  Intervention: Prevent or Manage Pain  Flowsheets (Taken 8/23/2023 2000)  Sleep/Rest Enhancement:   awakenings minimized   regular sleep/rest pattern promoted  Bowel Elimination Promotion: adequate fluid intake promoted

## 2023-09-10 NOTE — PLAN OF CARE
[facility]  Discharge Final Note    Primary Care Provider: Tory Batista MD    Expected Discharge Date: 8/24/2023    Final Discharge Note (most recent)       Final Note - 09/10/23 1742          Final Note    Assessment Type Final Discharge Note     Anticipated Discharge Disposition Hospice/Home     What phone number can be called within the next 1-3 days to see how you are doing after discharge? [phone]     Hospital Resources/Appts/Education Provided Provided patient/caregiver with written discharge plan information        Post-Acute Status    Post-Acute Authorization Hospice     Hospice Status Set-up Complete/Auth obtained     Discharge Delays None known at this time                     Important Message from Medicare             Contact Info       Pedro Sol II, MD   Specialty: Oncology, Hematology and Oncology    [address]  [address]  [place] [address]   Phone: [phone]       Next Steps: [place]    [facility]    [address], [address]  [place] [address]   Phone: [phone]       Next Steps: Follow up

## (undated) DEVICE — PENCIL ELECSURG ROCKER 15FT

## (undated) DEVICE — NDL 27G X 1 1/4

## (undated) DEVICE — CONTAINER SPECIMEN SCREW 4OZ

## (undated) DEVICE — SOL NACL IRR 1000ML BTL

## (undated) DEVICE — Device

## (undated) DEVICE — GLOVE PROTEXIS HYDROGEL SZ6.5

## (undated) DEVICE — NDL HYPO REG 25G X 1 1/2

## (undated) DEVICE — SPONGE LAP STRL 18X18IN

## (undated) DEVICE — SUT MCRYL PLUS 4-0 PS2 27IN

## (undated) DEVICE — APPLIER LIGACLIP MED 11IN

## (undated) DEVICE — COVER PROBE US 5.5X58L NON LTX

## (undated) DEVICE — SUPPORT ULNA NERVE PROTECTOR

## (undated) DEVICE — DRESSING TELFA N ADH 3X8IN

## (undated) DEVICE — GLOVE PROTEXIS BLUE LATEX 7

## (undated) DEVICE — GAUZE SPONGE 4X4 12PLY

## (undated) DEVICE — SYR 10CC LUER LOCK

## (undated) DEVICE — DRESSING TRANS 4X4 TEGADERM

## (undated) DEVICE — SUT VICRYL PLUS 3-0 SH 18IN

## (undated) DEVICE — ADHESIVE DERMABOND ADVANCED

## (undated) DEVICE — GLOVE PROTEXIS LTX MICRO  7